# Patient Record
Sex: FEMALE | Race: WHITE | NOT HISPANIC OR LATINO | URBAN - METROPOLITAN AREA
[De-identification: names, ages, dates, MRNs, and addresses within clinical notes are randomized per-mention and may not be internally consistent; named-entity substitution may affect disease eponyms.]

---

## 2019-10-30 ENCOUNTER — INPATIENT (INPATIENT)
Facility: HOSPITAL | Age: 84
LOS: 4 days | Discharge: HOME HEALTH | DRG: 314 | End: 2019-11-04
Attending: STUDENT IN AN ORGANIZED HEALTH CARE EDUCATION/TRAINING PROGRAM | Admitting: INTERNAL MEDICINE
Payer: MEDICARE

## 2019-10-30 VITALS
TEMPERATURE: 98 F | WEIGHT: 134.92 LBS | DIASTOLIC BLOOD PRESSURE: 73 MMHG | RESPIRATION RATE: 18 BRPM | HEART RATE: 90 BPM | SYSTOLIC BLOOD PRESSURE: 126 MMHG | OXYGEN SATURATION: 98 % | HEIGHT: 62 IN

## 2019-10-30 DIAGNOSIS — E03.9 HYPOTHYROIDISM, UNSPECIFIED: ICD-10-CM

## 2019-10-30 DIAGNOSIS — I50.9 HEART FAILURE, UNSPECIFIED: ICD-10-CM

## 2019-10-30 DIAGNOSIS — E87.1 HYPO-OSMOLALITY AND HYPONATREMIA: ICD-10-CM

## 2019-10-30 DIAGNOSIS — Z29.9 ENCOUNTER FOR PROPHYLACTIC MEASURES, UNSPECIFIED: ICD-10-CM

## 2019-10-30 DIAGNOSIS — M19.90 UNSPECIFIED OSTEOARTHRITIS, UNSPECIFIED SITE: ICD-10-CM

## 2019-10-30 DIAGNOSIS — R94.5 ABNORMAL RESULTS OF LIVER FUNCTION STUDIES: ICD-10-CM

## 2019-10-30 DIAGNOSIS — R06.00 DYSPNEA, UNSPECIFIED: ICD-10-CM

## 2019-10-30 LAB
ALBUMIN SERPL ELPH-MCNC: 3 G/DL — LOW (ref 3.3–5)
ALP SERPL-CCNC: 103 U/L — SIGNIFICANT CHANGE UP (ref 40–120)
ALT FLD-CCNC: 79 U/L — HIGH (ref 10–45)
ANION GAP SERPL CALC-SCNC: 10 MMOL/L — SIGNIFICANT CHANGE UP (ref 5–17)
APTT BLD: 28.6 SEC — SIGNIFICANT CHANGE UP (ref 27.5–36.3)
AST SERPL-CCNC: 120 U/L — HIGH (ref 10–40)
BILIRUB SERPL-MCNC: 0.3 MG/DL — SIGNIFICANT CHANGE UP (ref 0.2–1.2)
BUN SERPL-MCNC: 15 MG/DL — SIGNIFICANT CHANGE UP (ref 7–23)
CALCIUM SERPL-MCNC: 8.7 MG/DL — SIGNIFICANT CHANGE UP (ref 8.4–10.5)
CHLORIDE SERPL-SCNC: 94 MMOL/L — LOW (ref 96–108)
CO2 SERPL-SCNC: 26 MMOL/L — SIGNIFICANT CHANGE UP (ref 22–31)
CREAT SERPL-MCNC: 0.61 MG/DL — SIGNIFICANT CHANGE UP (ref 0.5–1.3)
GLUCOSE SERPL-MCNC: 147 MG/DL — HIGH (ref 70–99)
HCT VFR BLD CALC: 35.7 % — SIGNIFICANT CHANGE UP (ref 34.5–45)
HGB BLD-MCNC: 11.6 G/DL — SIGNIFICANT CHANGE UP (ref 11.5–15.5)
INR BLD: 1.49 RATIO — HIGH (ref 0.88–1.16)
MCHC RBC-ENTMCNC: 29.1 PG — SIGNIFICANT CHANGE UP (ref 27–34)
MCHC RBC-ENTMCNC: 32.5 GM/DL — SIGNIFICANT CHANGE UP (ref 32–36)
MCV RBC AUTO: 89.7 FL — SIGNIFICANT CHANGE UP (ref 80–100)
NRBC # BLD: 0 /100 WBCS — SIGNIFICANT CHANGE UP (ref 0–0)
NT-PROBNP SERPL-SCNC: 1390 PG/ML — HIGH (ref 0–300)
PLATELET # BLD AUTO: 320 K/UL — SIGNIFICANT CHANGE UP (ref 150–400)
POTASSIUM SERPL-MCNC: 4.3 MMOL/L — SIGNIFICANT CHANGE UP (ref 3.5–5.3)
POTASSIUM SERPL-SCNC: 4.3 MMOL/L — SIGNIFICANT CHANGE UP (ref 3.5–5.3)
PROT SERPL-MCNC: 6.8 G/DL — SIGNIFICANT CHANGE UP (ref 6–8.3)
PROTHROM AB SERPL-ACNC: 17.4 SEC — HIGH (ref 10–12.9)
RBC # BLD: 3.98 M/UL — SIGNIFICANT CHANGE UP (ref 3.8–5.2)
RBC # FLD: 13.8 % — SIGNIFICANT CHANGE UP (ref 10.3–14.5)
SODIUM SERPL-SCNC: 130 MMOL/L — LOW (ref 135–145)
TROPONIN T, HIGH SENSITIVITY RESULT: 17 NG/L — SIGNIFICANT CHANGE UP (ref 0–51)
TROPONIN T, HIGH SENSITIVITY RESULT: 18 NG/L — SIGNIFICANT CHANGE UP (ref 0–51)
WBC # BLD: 9.84 K/UL — SIGNIFICANT CHANGE UP (ref 3.8–10.5)
WBC # FLD AUTO: 9.84 K/UL — SIGNIFICANT CHANGE UP (ref 3.8–10.5)

## 2019-10-30 PROCEDURE — 93010 ELECTROCARDIOGRAM REPORT: CPT

## 2019-10-30 PROCEDURE — 99285 EMERGENCY DEPT VISIT HI MDM: CPT

## 2019-10-30 PROCEDURE — 71045 X-RAY EXAM CHEST 1 VIEW: CPT | Mod: 26

## 2019-10-30 RX ORDER — FUROSEMIDE 40 MG
40 TABLET ORAL DAILY
Refills: 0 | Status: DISCONTINUED | OUTPATIENT
Start: 2019-10-30 | End: 2019-10-31

## 2019-10-30 RX ORDER — FUROSEMIDE 40 MG
20 TABLET ORAL ONCE
Refills: 0 | Status: COMPLETED | OUTPATIENT
Start: 2019-10-30 | End: 2019-10-30

## 2019-10-30 RX ORDER — HEPARIN SODIUM 5000 [USP'U]/ML
5000 INJECTION INTRAVENOUS; SUBCUTANEOUS EVERY 8 HOURS
Refills: 0 | Status: DISCONTINUED | OUTPATIENT
Start: 2019-10-30 | End: 2019-10-31

## 2019-10-30 RX ADMIN — HEPARIN SODIUM 5000 UNIT(S): 5000 INJECTION INTRAVENOUS; SUBCUTANEOUS at 22:12

## 2019-10-30 RX ADMIN — Medication 20 MILLIGRAM(S): at 17:31

## 2019-10-30 NOTE — H&P ADULT - NSHPREVIEWOFSYSTEMS_GEN_ALL_CORE
REVIEW OF SYSTEMS:    CONSTITUTIONAL: No weakness, fevers or chills  EYES/ENT: No visual changes;  No vertigo or throat pain   NECK: No pain or stiffness  RESPIRATORY: + dyspnea, SOB   CARDIOVASCULAR: mild chest discomfort at the time, resolved after diuresis   GASTROINTESTINAL: No abdominal or epigastric pain. No nausea, vomiting, or hematemesis; No diarrhea or constipation. No melena or hematochezia.  GENITOURINARY: No dysuria, frequency or hematuria  NEUROLOGICAL: No numbness or weakness  SKIN: No itching, burning, rashes, or lesions   All other review of systems is negative unless indicated above.

## 2019-10-30 NOTE — ED PROVIDER NOTE - PHYSICAL EXAMINATION
GEN: Well Appearing, Nontoxic, NAD  HEENT: NC/AT, Symm Facies. PERRL, EOMI, MMM, posterior pharynx clear  CV: No JVD/Bruits or stridor;  +S1S2, RRR w/o m/g/r  RESP: decreased breath sounds diffusely bilaterally  ABD: Soft, nt/nd, +BS. No guarding/rebound. No RUQ tender, no CVAT  EXT/MSK: No lower extremity edema or calf tenderness. WWP, palpable pulses. FROMx4  SKIN: No erythema, lesions or rash  Neuro: Grossly intact, AOX3 with normal speech, CN II-XII intact; Sensation intact, motor 5/5 throughout. Gait normal

## 2019-10-30 NOTE — H&P ADULT - HISTORY OF PRESENT ILLNESS
Pt is a 87 y/o Female with pmhx of arthritis coming in with progressively worsening shortness of breath x1 week. Patient states last week she started to feel short of breath when she would walk around or minimally exert herself, now for the last few days she has been feeling more short of breath with minimal movements. States that she has had some mild cough associated with this shortness of breath; however, denies any mucous production, fever or chills. Denies any chest pain associated with this. Denies ever having symptoms like this before. Does not use oxygen at home.

## 2019-10-30 NOTE — ED PROVIDER NOTE - CLINICAL SUMMARY MEDICAL DECISION MAKING FREE TEXT BOX
87 y/o F p/w worsening sob and dyspnea on exertion, now with sob at rest x1 week. PE remarkable for decreased breath sounds bilaterally. Never had this before, no chest pain. Will obtain labs, BNP, trop, ekg, xray chest and reassess.

## 2019-10-30 NOTE — H&P ADULT - PROBLEM SELECTOR PLAN 1
new onset with elevated ProBNP  diuresis   Ia nd OS  fluid restriction   serial CE and EKG   tele monitoring   Card eval   Echocardiogram  may benefits from ischemic W.U after euvolemic   monitor vitals

## 2019-10-30 NOTE — ED ADULT NURSE NOTE - OBJECTIVE STATEMENT
88F pt AxOx3 BIBA from home c/o worsening SOB/RMJq1rh. Pt states last week she was only STOCK and has now become SOB even at rest. Pt reports +orthopnea. Pt states she ambulates w/o assistance at baseline. PMHx hypothyroid, on Synthroid. Pt denies swelling to legs. Pt denies CP/N/V/D/fever/chills. Pt denies cough. On assessment, pt is tachypneic but tolerating 2L/NC well, +STOCK noted, resp even, unlabored. Abd soft/NT/ND. No edema noted to lower extremities. VSS. NAD noted. #20G RAC, labs drawn and sent. EKG done at bedside. Bed locked in lowest position. Granddaughter at bedside. MD at bedside. Will continue to monitor.

## 2019-10-30 NOTE — H&P ADULT - ASSESSMENT
Pt is a 87 y/o Female with pmhx of arthritis coming in with progressively worsening shortness of breath x1 week.

## 2019-10-30 NOTE — ED PROVIDER NOTE - NS ED ROS FT
Constitutional: No fever or chills  Eyes: No visual changes, eye pain or redness  HEENT: No throat pain, ear pain, nasal pain. No nose bleeding.  CV: No chest pain or lower extremity edema  Resp: +sob and cough  GI: No abd pain. No nausea or vomiting. No diarrhea. No constipation.   : No dysuria, hematuria.   MSK: No musculoskeletal pain  Skin: No rash  Neuro: No headache. No numbness or tingling. No weakness.

## 2019-10-30 NOTE — ED PROVIDER NOTE - ATTENDING CONTRIBUTION TO CARE
88 F w/ PMH of arthritis p/w worsening sob x1 week. Pt has hx of hypothyrodism recently stopped her synthroid. Pt states that she has no fevers, no chills. No nausea no vomiting. On room air pt is dyspneic and she becomes tachypneic. Pt states she is not on baseline oxygen.      I have reviewed the triage vital signs.   Const: Well-nourished, Well-developed,   Eyes: no conjunctival injection and no scleral icterus   ENMT: Moist mucus membranes,   CVS: +S1/S2, radial/DP pulse 2+ bilaterally  RESP: increased respiratory effort, diminished breath sounds bilaterally  GI: Nontender/Nondistended soft abdomen, no CVA tenderness   MSK: Extremities w/o deformity or ttp,   Psych: Awake, Alert, & Orientedx3;  Appropriate mood and affect, cooperative    ?valve disease no prior echo before, possibly acs, unlikely dissection/pneumonia. plan for labs, ekg and cxr w/admission 88 F w/ PMH of arthritis p/w worsening sob x1 week. Pt has hx of hypothyrodism recently stopped her synthroid. Pt states that she has no fevers, no chills. No nausea no vomiting. On room air pt is dyspneic and she becomes tachypneic. Pt states she is not on baseline oxygen.      I have reviewed the triage vital signs.   Const: Well-nourished, Well-developed,   Eyes: no conjunctival injection and no scleral icterus   ENMT: Moist mucus membranes,   CVS: +S1/S2, radial/DP pulse 2+ bilaterally  RESP: increased respiratory effort, diminished breath sounds bilaterally  GI: Nontender/Nondistended soft abdomen, no CVA tenderness   MSK: Extremities w/o deformity or ttp,   Psych: Awake, Alert, & Orientedx3;  Appropriate mood and affect, cooperative    ?valve disease no prior echo before, possibly acs, unlikely dissection/pneumonia. plan for labs, ekg and cxr w/admission      Pt w/ elevated BNP, will diuresis w/ lasix and admit never been dx w/ CHF in the past

## 2019-10-30 NOTE — H&P ADULT - NSHPPHYSICALEXAM_GEN_ALL_CORE
Vital Signs Last 24 Hrs  T(C): 36.9 (30 Oct 2019 16:00), Max: 36.9 (30 Oct 2019 14:49)  T(F): 98.4 (30 Oct 2019 16:00), Max: 98.4 (30 Oct 2019 14:49)  HR: 79 (30 Oct 2019 16:00) (73 - 90)  BP: 141/79 (30 Oct 2019 16:00) (126/73 - 145/70)  BP(mean): --  RR: 24 (30 Oct 2019 16:00) (18 - 24)  SpO2: 98% (30 Oct 2019 16:00) (98% - 99%) Vital Signs Last 24 Hrs  T(C): 36.9 (30 Oct 2019 16:00), Max: 36.9 (30 Oct 2019 14:49)  T(F): 98.4 (30 Oct 2019 16:00), Max: 98.4 (30 Oct 2019 14:49)  HR: 79 (30 Oct 2019 16:00) (73 - 90)  BP: 141/79 (30 Oct 2019 16:00) (126/73 - 145/70)  BP(mean): --  RR: 24 (30 Oct 2019 16:00) (18 - 24)  SpO2: 98% (30 Oct 2019 16:00) (98% - 99%)    Appearance: Normal	  HEENT:   Normal oral mucosa, PERRL, EOMI	  Lymphatic: No lymphadenopathy , no edema  Cardiovascular: S1S2, irregular   Respiratory: lower abse rales   Gastrointestinal:  Soft, Non-tender, + BS	  Skin: No rashes, No ecchymoses, No cyanosis, warm to touch  Musculoskeletal: Normal range of motion, normal strength  Psychiatry:  Mood & affect appropriate  Ext: No edema

## 2019-10-30 NOTE — H&P ADULT - NSHPLABSRESULTS_GEN_ALL_CORE
11.6   9.84  )-----------( 320      ( 30 Oct 2019 15:34 )             35.7               10-30    130<L>  |  94<L>  |  15  ----------------------------<  147<H>  4.3   |  26  |  0.61    Ca    8.7      30 Oct 2019 15:34    TPro  6.8  /  Alb  3.0<L>  /  TBili  0.3  /  DBili  x   /  AST  120<H>  /  ALT  79<H>  /  AlkPhos  103  10-30    PT/INR - ( 30 Oct 2019 15:34 )   PT: 17.4 sec;   INR: 1.49 ratio         PTT - ( 30 Oct 2019 15:34 )  PTT:28.6 sec                   < from: Xray Chest 1 View AP/PA (10.30.19 @ 15:36) >      IMPRESSION:  Left pleural effusion with associated left basilar   atelectasis. 11.6   9.84  )-----------( 320      ( 30 Oct 2019 15:34 )             35.7               10-30    130<L>  |  94<L>  |  15  ----------------------------<  147<H>  4.3   |  26  |  0.61    Ca    8.7      30 Oct 2019 15:34    TPro  6.8  /  Alb  3.0<L>  /  TBili  0.3  /  DBili  x   /  AST  120<H>  /  ALT  79<H>  /  AlkPhos  103  10-30    PT/INR - ( 30 Oct 2019 15:34 )   PT: 17.4 sec;   INR: 1.49 ratio         PTT - ( 30 Oct 2019 15:34 )  PTT:28.6 sec                   < from: Xray Chest 1 View AP/PA (10.30.19 @ 15:36) >      IMPRESSION:  Left pleural effusion with associated left basilar   atelectasis.    EKG: noted

## 2019-10-30 NOTE — ED PROVIDER NOTE - OBJECTIVE STATEMENT
87 y/o F pmhx arthritis coming in with progressively worsening shortness of breath x1 week. Patient states last week she started to feel short of breath when she would walk around or minimally exert herself, now for the last few days she has been feeling more short of breath with minimal movements. States that she has had some mild cough associated with this shortness of breath; however, denies any mucous production, fever or chills. Denies any chest pain associated with this. Denies ever having symptoms like this before. Does not use oxygen at home.

## 2019-10-31 DIAGNOSIS — I48.91 UNSPECIFIED ATRIAL FIBRILLATION: ICD-10-CM

## 2019-10-31 LAB
ALBUMIN SERPL ELPH-MCNC: 2.6 G/DL — LOW (ref 3.3–5)
ALBUMIN SERPL ELPH-MCNC: 2.9 G/DL — LOW (ref 3.3–5)
ALP SERPL-CCNC: 92 U/L — SIGNIFICANT CHANGE UP (ref 40–120)
ALP SERPL-CCNC: 94 U/L — SIGNIFICANT CHANGE UP (ref 40–120)
ALT FLD-CCNC: 67 U/L — HIGH (ref 10–45)
ALT FLD-CCNC: 76 U/L — HIGH (ref 10–45)
ANION GAP SERPL CALC-SCNC: 18 MMOL/L — HIGH (ref 5–17)
ANION GAP SERPL CALC-SCNC: 9 MMOL/L — SIGNIFICANT CHANGE UP (ref 5–17)
APPEARANCE UR: ABNORMAL
APTT BLD: 102.9 SEC — HIGH (ref 27.5–36.3)
AST SERPL-CCNC: 59 U/L — HIGH (ref 10–40)
AST SERPL-CCNC: 72 U/L — HIGH (ref 10–40)
BACTERIA # UR AUTO: NEGATIVE — SIGNIFICANT CHANGE UP
BILIRUB SERPL-MCNC: 0.4 MG/DL — SIGNIFICANT CHANGE UP (ref 0.2–1.2)
BILIRUB SERPL-MCNC: 0.4 MG/DL — SIGNIFICANT CHANGE UP (ref 0.2–1.2)
BILIRUB UR-MCNC: NEGATIVE — SIGNIFICANT CHANGE UP
BLD GP AB SCN SERPL QL: NEGATIVE — SIGNIFICANT CHANGE UP
BUN SERPL-MCNC: 13 MG/DL — SIGNIFICANT CHANGE UP (ref 7–23)
BUN SERPL-MCNC: 18 MG/DL — SIGNIFICANT CHANGE UP (ref 7–23)
CALCIUM SERPL-MCNC: 8.7 MG/DL — SIGNIFICANT CHANGE UP (ref 8.4–10.5)
CALCIUM SERPL-MCNC: 8.8 MG/DL — SIGNIFICANT CHANGE UP (ref 8.4–10.5)
CHLORIDE SERPL-SCNC: 88 MMOL/L — LOW (ref 96–108)
CHLORIDE SERPL-SCNC: 92 MMOL/L — LOW (ref 96–108)
CHLORIDE UR-SCNC: 66 MMOL/L — SIGNIFICANT CHANGE UP
CHOLEST SERPL-MCNC: 133 MG/DL — SIGNIFICANT CHANGE UP (ref 10–199)
CK MB CFR SERPL CALC: 3 NG/ML — SIGNIFICANT CHANGE UP (ref 0–3.8)
CO2 SERPL-SCNC: 27 MMOL/L — SIGNIFICANT CHANGE UP (ref 22–31)
CO2 SERPL-SCNC: 30 MMOL/L — SIGNIFICANT CHANGE UP (ref 22–31)
COLOR SPEC: YELLOW — SIGNIFICANT CHANGE UP
COMMENT - URINE: SIGNIFICANT CHANGE UP
CREAT SERPL-MCNC: 0.58 MG/DL — SIGNIFICANT CHANGE UP (ref 0.5–1.3)
CREAT SERPL-MCNC: 0.71 MG/DL — SIGNIFICANT CHANGE UP (ref 0.5–1.3)
DIFF PNL FLD: ABNORMAL
EPI CELLS # UR: 16 /HPF — HIGH (ref 0–5)
GLUCOSE SERPL-MCNC: 114 MG/DL — HIGH (ref 70–99)
GLUCOSE SERPL-MCNC: 147 MG/DL — HIGH (ref 70–99)
GLUCOSE UR QL: NEGATIVE — SIGNIFICANT CHANGE UP
GRAN CASTS # UR COMP ASSIST: 2 /LPF — HIGH
HBA1C BLD-MCNC: 5.6 % — SIGNIFICANT CHANGE UP (ref 4–5.6)
HCT VFR BLD CALC: 37 % — SIGNIFICANT CHANGE UP (ref 34.5–45)
HCT VFR BLD CALC: 38.6 % — SIGNIFICANT CHANGE UP (ref 34.5–45)
HDLC SERPL-MCNC: 43 MG/DL — LOW
HGB BLD-MCNC: 11.9 G/DL — SIGNIFICANT CHANGE UP (ref 11.5–15.5)
HGB BLD-MCNC: 12.4 G/DL — SIGNIFICANT CHANGE UP (ref 11.5–15.5)
HYALINE CASTS # UR AUTO: 11 /LPF — HIGH (ref 0–7)
KETONES UR-MCNC: NEGATIVE — SIGNIFICANT CHANGE UP
LEUKOCYTE ESTERASE UR-ACNC: ABNORMAL
LIPID PNL WITH DIRECT LDL SERPL: 70 MG/DL — SIGNIFICANT CHANGE UP
MAGNESIUM SERPL-MCNC: 2 MG/DL — SIGNIFICANT CHANGE UP (ref 1.6–2.6)
MCHC RBC-ENTMCNC: 28.7 PG — SIGNIFICANT CHANGE UP (ref 27–34)
MCHC RBC-ENTMCNC: 29.2 PG — SIGNIFICANT CHANGE UP (ref 27–34)
MCHC RBC-ENTMCNC: 32.1 GM/DL — SIGNIFICANT CHANGE UP (ref 32–36)
MCHC RBC-ENTMCNC: 32.2 GM/DL — SIGNIFICANT CHANGE UP (ref 32–36)
MCV RBC AUTO: 89.4 FL — SIGNIFICANT CHANGE UP (ref 80–100)
MCV RBC AUTO: 90.8 FL — SIGNIFICANT CHANGE UP (ref 80–100)
NITRITE UR-MCNC: NEGATIVE — SIGNIFICANT CHANGE UP
NRBC # BLD: 0 /100 WBCS — SIGNIFICANT CHANGE UP (ref 0–0)
NRBC # BLD: 0 /100 WBCS — SIGNIFICANT CHANGE UP (ref 0–0)
OSMOLALITY UR: 466 MOSM/KG — SIGNIFICANT CHANGE UP (ref 50–1200)
PH UR: 6 — SIGNIFICANT CHANGE UP (ref 5–8)
PHOSPHATE SERPL-MCNC: 3 MG/DL — SIGNIFICANT CHANGE UP (ref 2.5–4.5)
PLATELET # BLD AUTO: 307 K/UL — SIGNIFICANT CHANGE UP (ref 150–400)
PLATELET # BLD AUTO: 387 K/UL — SIGNIFICANT CHANGE UP (ref 150–400)
POTASSIUM SERPL-MCNC: 3.9 MMOL/L — SIGNIFICANT CHANGE UP (ref 3.5–5.3)
POTASSIUM SERPL-MCNC: 4.3 MMOL/L — SIGNIFICANT CHANGE UP (ref 3.5–5.3)
POTASSIUM SERPL-SCNC: 3.9 MMOL/L — SIGNIFICANT CHANGE UP (ref 3.5–5.3)
POTASSIUM SERPL-SCNC: 4.3 MMOL/L — SIGNIFICANT CHANGE UP (ref 3.5–5.3)
PROT SERPL-MCNC: 6.6 G/DL — SIGNIFICANT CHANGE UP (ref 6–8.3)
PROT SERPL-MCNC: 6.8 G/DL — SIGNIFICANT CHANGE UP (ref 6–8.3)
PROT UR-MCNC: SIGNIFICANT CHANGE UP
RBC # BLD: 4.14 M/UL — SIGNIFICANT CHANGE UP (ref 3.8–5.2)
RBC # BLD: 4.25 M/UL — SIGNIFICANT CHANGE UP (ref 3.8–5.2)
RBC # FLD: 13.7 % — SIGNIFICANT CHANGE UP (ref 10.3–14.5)
RBC # FLD: 13.7 % — SIGNIFICANT CHANGE UP (ref 10.3–14.5)
RBC CASTS # UR COMP ASSIST: 5 /HPF — HIGH (ref 0–4)
RH IG SCN BLD-IMP: POSITIVE — SIGNIFICANT CHANGE UP
SODIUM SERPL-SCNC: 128 MMOL/L — LOW (ref 135–145)
SODIUM SERPL-SCNC: 136 MMOL/L — SIGNIFICANT CHANGE UP (ref 135–145)
SODIUM UR-SCNC: 42 MMOL/L — SIGNIFICANT CHANGE UP
SP GR SPEC: 1.01 — SIGNIFICANT CHANGE UP (ref 1.01–1.02)
TOTAL CHOLESTEROL/HDL RATIO MEASUREMENT: 3.1 RATIO — LOW (ref 3.3–7.1)
TRIGL SERPL-MCNC: 98 MG/DL — SIGNIFICANT CHANGE UP (ref 10–149)
TROPONIN T, HIGH SENSITIVITY RESULT: 19 NG/L — SIGNIFICANT CHANGE UP (ref 0–51)
TSH SERPL-MCNC: 2.85 UIU/ML — SIGNIFICANT CHANGE UP (ref 0.27–4.2)
UROBILINOGEN FLD QL: SIGNIFICANT CHANGE UP
WBC # BLD: 10.27 K/UL — SIGNIFICANT CHANGE UP (ref 3.8–10.5)
WBC # BLD: 7.28 K/UL — SIGNIFICANT CHANGE UP (ref 3.8–10.5)
WBC # FLD AUTO: 10.27 K/UL — SIGNIFICANT CHANGE UP (ref 3.8–10.5)
WBC # FLD AUTO: 7.28 K/UL — SIGNIFICANT CHANGE UP (ref 3.8–10.5)
WBC UR QL: 10 /HPF — HIGH (ref 0–5)

## 2019-10-31 PROCEDURE — 71250 CT THORAX DX C-: CPT | Mod: 26

## 2019-10-31 PROCEDURE — 99223 1ST HOSP IP/OBS HIGH 75: CPT | Mod: GC

## 2019-10-31 PROCEDURE — 93010 ELECTROCARDIOGRAM REPORT: CPT

## 2019-10-31 PROCEDURE — 93306 TTE W/DOPPLER COMPLETE: CPT | Mod: 26

## 2019-10-31 PROCEDURE — 93010 ELECTROCARDIOGRAM REPORT: CPT | Mod: 77

## 2019-10-31 RX ORDER — FUROSEMIDE 40 MG
40 TABLET ORAL
Refills: 0 | Status: DISCONTINUED | OUTPATIENT
Start: 2019-10-31 | End: 2019-10-31

## 2019-10-31 RX ORDER — METOPROLOL TARTRATE 50 MG
5 TABLET ORAL ONCE
Refills: 0 | Status: COMPLETED | OUTPATIENT
Start: 2019-10-31 | End: 2019-10-31

## 2019-10-31 RX ORDER — HEPARIN SODIUM 5000 [USP'U]/ML
5000 INJECTION INTRAVENOUS; SUBCUTANEOUS EVERY 6 HOURS
Refills: 0 | Status: DISCONTINUED | OUTPATIENT
Start: 2019-10-31 | End: 2019-11-01

## 2019-10-31 RX ORDER — HEPARIN SODIUM 5000 [USP'U]/ML
INJECTION INTRAVENOUS; SUBCUTANEOUS
Qty: 25000 | Refills: 0 | Status: DISCONTINUED | OUTPATIENT
Start: 2019-10-31 | End: 2019-11-01

## 2019-10-31 RX ORDER — LEVOTHYROXINE SODIUM 125 MCG
25 TABLET ORAL DAILY
Refills: 0 | Status: DISCONTINUED | OUTPATIENT
Start: 2019-10-31 | End: 2019-11-04

## 2019-10-31 RX ORDER — HEPARIN SODIUM 5000 [USP'U]/ML
2500 INJECTION INTRAVENOUS; SUBCUTANEOUS EVERY 6 HOURS
Refills: 0 | Status: DISCONTINUED | OUTPATIENT
Start: 2019-10-31 | End: 2019-11-01

## 2019-10-31 RX ORDER — METOPROLOL TARTRATE 50 MG
25 TABLET ORAL
Refills: 0 | Status: DISCONTINUED | OUTPATIENT
Start: 2019-10-31 | End: 2019-10-31

## 2019-10-31 RX ORDER — POTASSIUM CHLORIDE 20 MEQ
20 PACKET (EA) ORAL
Refills: 0 | Status: COMPLETED | OUTPATIENT
Start: 2019-10-31 | End: 2019-10-31

## 2019-10-31 RX ORDER — METOPROLOL TARTRATE 50 MG
2.5 TABLET ORAL ONCE
Refills: 0 | Status: COMPLETED | OUTPATIENT
Start: 2019-10-31 | End: 2019-10-31

## 2019-10-31 RX ORDER — SENNA PLUS 8.6 MG/1
1 TABLET ORAL ONCE
Refills: 0 | Status: COMPLETED | OUTPATIENT
Start: 2019-10-31 | End: 2019-10-31

## 2019-10-31 RX ADMIN — Medication 20 MILLIEQUIVALENT(S): at 20:22

## 2019-10-31 RX ADMIN — Medication 2.5 MILLIGRAM(S): at 07:09

## 2019-10-31 RX ADMIN — HEPARIN SODIUM 5000 UNIT(S): 5000 INJECTION INTRAVENOUS; SUBCUTANEOUS at 05:45

## 2019-10-31 RX ADMIN — Medication 5 MILLIGRAM(S): at 09:02

## 2019-10-31 RX ADMIN — Medication 25 MILLIGRAM(S): at 09:04

## 2019-10-31 RX ADMIN — HEPARIN SODIUM 1000 UNIT(S)/HR: 5000 INJECTION INTRAVENOUS; SUBCUTANEOUS at 18:24

## 2019-10-31 RX ADMIN — SENNA PLUS 1 TABLET(S): 8.6 TABLET ORAL at 21:55

## 2019-10-31 RX ADMIN — Medication 40 MILLIGRAM(S): at 07:34

## 2019-10-31 RX ADMIN — HEPARIN SODIUM 1100 UNIT(S)/HR: 5000 INJECTION INTRAVENOUS; SUBCUTANEOUS at 10:01

## 2019-10-31 NOTE — CONSULT NOTE ADULT - SUBJECTIVE AND OBJECTIVE BOX
[facility]   MD Ceci Posey D.O. Fatima Sheikh, D.O. Ruoru Wong, PA    From 7 AM - 5 PM:  OFFICE: [phone]  Dr. Frey cell: [phone]  Dr. Gaona cell: [phone]   cell: [phone]  CRYSTAL Rosales cell: [phone]    From 5 PM - 7 AM: Answering Service: [phone]      -- INITIAL RENAL CONSULT NOTE  --------------------------------------------------------------------------------  HPI: Ms. Galvan is an 87 yo F w/ PMH arthritis who came in for evaluation of SOB X1 week. Found to have new onset heart failure and Afib.     Currently, she has no complaints. Does not feel SOB today. Denies any N/V, diarrhea, abdominal pain. Never told she has any renal problems. Unaware of hyponatremia in the past.        PAST HISTORY  --------------------------------------------------------------------------------  PAST MEDICAL & SURGICAL HISTORY:  Hypothyroid    FAMILY HISTORY:    PAST SOCIAL HISTORY:    ALLERGIES & MEDICATIONS  --------------------------------------------------------------------------------  Allergies    No Known Allergies    Intolerances      Standing Inpatient Medications  furosemide   Injectable 40 milliGRAM(s) IV Push daily  heparin  Infusion.  Unit(s)/Hr IV Continuous <Continuous>  metoprolol tartrate 25 milliGRAM(s) Oral two times a day    PRN Inpatient Medications  heparin  Injectable 5000 Unit(s) IV Push every 6 hours PRN  heparin  Injectable 2500 Unit(s) IV Push every 6 hours PRN      REVIEW OF SYSTEMS  --------------------------------------------------------------------------------  Gen: No fevers/chills  Skin: No rashes  Head/Eyes/Ears: Normal hearing,  Normal vision   Respiratory: No dyspnea, cough  CV: No chest pain  GI: No abdominal pain, diarrhea, constipation, nausea, vomiting  : No dysuria, hematuria  MSK: No  edema  Heme: No easy bruising or bleeding  Psych: No significant depression    All other systems were reviewed and are negative, except as noted.    VITALS/PHYSICAL EXAM  --------------------------------------------------------------------------------  T(C): 36.3 (10-31-19 @ 12:29), Max: 36.9 (10-30-19 @ 14:49)  HR: 100 (10-31-19 @ 12:29) (73 - 120)  BP: 110/67 (10-31-19 @ 12:29) (108/76 - 152/72)  RR: 18 (10-31-19 @ 12:29) (16 - 24)  SpO2: 98% (10-31-19 @ 12:29) (96% - 99%)  Wt(kg): --  Height (cm): 160 (10-30-19 @ 18:45)  Weight (kg): 64 (10-30-19 @ 18:45)  BMI (kg/m2): 25 (10-30-19 @ 18:45)  BSA (m2): 1.67 (10-30-19 @ 18:45)      10-30-19 @ 07:01  -  10-31-19 @ 07:00  --------------------------------------------------------  IN: 240 mL / OUT: 150 mL / NET: 90 mL      Physical Exam:  	Gen: NAD  	HEENT: MMM  	Pulm: CTA B/L  	CV: S1S2, + murmur  	Abd: Soft, +BS   	Ext: No LE edema B/L  	Neuro: Awake  	Skin: Warm and dry  	    LABS/STUDIES  --------------------------------------------------------------------------------              11.9   7.28  >-----------<  307      [10-31-19 @ 06:47]              37.0     136  |  88  |  13  ----------------------------<  114      [10-31-19 @ 06:47]  3.9   |  30  |  0.58        Ca     8.8     [10-31-19 @ 06:47]    TPro  6.8  /  Alb  2.9  /  TBili  0.4  /  DBili  x   /  AST  72  /  ALT  76  /  AlkPhos  94  [10-31-19 @ 06:47]    PT/INR: PT 17.4 , INR 1.49       [10-30-19 @ 15:34]  PTT: 28.6       [10-30-19 @ 15:34]    Uric acid 3.4      [10-30-19 @ 17:45]    Creatinine Trend:  SCr 0.58 [10-31 @ 06:47]  SCr 0.61 [10-30 @ 15:34]    Urinalysis - [10-31-19 @ 09:41]      Color Yellow / Appearance Slightly Turbid / SG 1.015 / pH 6.0      Gluc Negative / Ketone Negative  / Bili Negative / Urobili <2 mg/dL       Blood Moderate / Protein Trace / Leuk Est Small / Nitrite Negative      RBC 5 / WBC 10 / Hyaline 11 / Gran 2 / Sq Epi  / Non Sq Epi 16 / Bacteria Negative    Urine Sodium 42      [10-31-19 @ 06:49]  Urine Chloride 66      [10-31-19 @ 06:49]  Urine Osmolality 466      [10-31-19 @ 09:41]    HbA1c 5.6      [10-31-19 @ 08:54]  TSH 2.85      [10-31-19 @ 08:38]  Lipid: chol 133, TG 98, HDL 43, LDL 70      [10-31-19 @ 08:34]

## 2019-10-31 NOTE — PROGRESS NOTE ADULT - PROBLEM SELECTOR PLAN 1
Newonset  rate control with lopressor   Card terrance appreciated  tele monitoring  IV BB for rate control PRN   discussed with family, agreed for AC with heparin and switch to DOACS when ready for discharge   Echo

## 2019-10-31 NOTE — CHART NOTE - NSCHARTNOTEFT_GEN_A_CORE
Patient is a 88y old  Female who presents with a chief complaint of SOB, CHF exacerbation ; Asked to evaluate for new onset afib with RVR @ 9:20 AM> . seen and examined. Pt having breakfast. denies palpitations /increased SOB. Endorses having palpitations on and OFF at home.     Vital Signs Last 24 Hrs  T(C): 36.6 (31 Oct 2019 09:06), Max: 36.9 (30 Oct 2019 14:49)  T(F): 97.8 (31 Oct 2019 09:06), Max: 98.4 (30 Oct 2019 14:49)  HR: 99 (31 Oct 2019 09:21) (73 - 120)  BP: 118/77 (31 Oct 2019 09:21) (108/76 - 152/72)  RR: 18 (31 Oct 2019 09:06) (16 - 24)  SpO2: 96% (31 Oct 2019 09:06) (96% - 99%)      PHYSICAL EXAM:  General: NAD  Respiratory: b/l exp wheeze  Cardiovascular: S1S2 irregular  Gastrointestinal: soft, + BS  Extremities: NO edema  Vascular: +2 pedal pulses   Neurological: Awake & alert    Assessment   87 y/o Female with pmhx of arthritis coming in with progressively worsening shortness of breath x1 week. Noted to be in ACute pulmonary edema with Pleural effusions on chest X-ray and elevated ProBNP. Now in afib with RVR ( new onset ) with HR 150s.    Plan   12 lead EKG  Lopressor 5 mg IVP  start Lopressor 25 mg PO bid  start Heparin drip ( Discussed with Family)  Attending present and agrees with plan  Cardiology consult called

## 2019-10-31 NOTE — CONSULT NOTE ADULT - ATTENDING COMMENTS
**Echo personally reviewed  Early tamponade physiology.   Rapid AF. Symptoms.   Hemodynamically stable.  Give IVF. Would D./c lopressor. Move to CCU2.  Discussed w patient/family

## 2019-10-31 NOTE — PROVIDER CONTACT NOTE (OTHER) - ASSESSMENT
Patient is A&O x 4. Denies chest pain, dizziness, is asymptomatic. VS: /67, HR 120s, Temp 97.3, RR 18, O2 96%.

## 2019-10-31 NOTE — CONSULT NOTE ADULT - ASSESSMENT
89 y/o Female with pmhx of arthritis p/w ADHF and new onset afib w/ RVR.    #ADHF 2/2 afib w/ rvr  -clinical course and symptomatology suggests afib etiology for ADHF  -do not suspect ACS at this time  -hypervolemic on exam; elevated JVP, pulm edema  -s/p Lasix 40mg IV, excellent UOP  -would c/w Lasix 40mg IV BID  -okay to continue Lopressor 25mg bid  -if rates remained > 100 throughout day, increase evening metoprolol 50mg  -metoprolol 5mg IVP q6h PRN sustained HR > 110  -monitor i/os, BID BMP  -obtain TTE  -CHADsVasc > 3, on hep gtt, would keep for now pending further workup of pleural effusion  -defer ischemic eval at this time pending TTE results    #left sided pleural effusion  -unusual to have unilateral left sided pleural effusion in setting of ADHF  -would consult pulm for further eval    Will discuss w/ attending  Simeon Matamoros MD  Cardiology Fellow - PGY 4  Text or Call: [phone]  For all New Consults and Questions:  [email or web address]   Login: [email or web address] 89 y/o Female with pmhx of arthritis p/w ADHF and new onset afib w/ RVR.    #ADHF 2/2 afib w/ rvr  -clinical course and symptomatology suggests afib etiology for ADHF  -do not suspect ACS at this time  -hypervolemic on exam; elevated JVP, pulm edema  -s/p Lasix 40mg IV, excellent UOP  -would c/w Lasix 40mg IV BID  -okay to continue Lopressor 25mg bid  -if rates remained > 100 throughout day, increase evening metoprolol 50mg  -metoprolol 5mg IVP q6h PRN sustained HR > 110  -monitor i/os, BID BMP  -obtain TTE  -CHADsVasc > 3, on hep gtt, would keep for now pending further workup of pleural effusion  -defer ischemic eval at this time pending TTE results    #left sided pleural effusion  -unusual to have unilateral left sided pleural effusion in setting of ADHF  -degree of hypoxia/tachpnea w/ otherwise trace pulm edema suggest effusion is complicating clinical picture  -would consider thoracentesis/pulm consult for further eval    Will discuss w/ attending  Simeon Matamoros MD  Cardiology Fellow - PGY 4  Text or Call: [phone]  For all New Consults and Questions:  [email or web address]   Login: [email or web address] 87 y/o Female with pmhx of arthritis p/w ADHF and new onset afib w/ RVR.    Please see attending addendum    #ADHF 2/2 afib w/ rvr  -clinical course and symptomatology suggests afib etiology for ADHF  -do not suspect ACS at this time  -hypervolemic on exam; elevated JVP, pulm edema  -s/p Lasix 40mg IV, excellent UOP  -would c/w Lasix 40mg IV BID  -okay to continue Lopressor 25mg bid  -if rates remained > 100 throughout day, increase evening metoprolol 50mg  -metoprolol 5mg IVP q6h PRN sustained HR > 110  -monitor i/os, BID BMP  -obtain TTE  -CHADsVasc > 3, on hep gtt, would keep for now pending further workup of pleural effusion  -defer ischemic eval at this time pending TTE results    #left sided pleural effusion  -unusual to have unilateral left sided pleural effusion in setting of ADHF  -degree of hypoxia/tachpnea w/ otherwise trace pulm edema suggest effusion is complicating clinical picture  -would consider thoracentesis/pulm consult for further eval    Will discuss w/ attending  Simeon Matamoros MD  Cardiology Fellow - PGY 4  Text or Call: [phone]  For all New Consults and Questions:  [email or web address]   Login: [email or web address]

## 2019-10-31 NOTE — PROGRESS NOTE ADULT - ASSESSMENT
Pt is a 89 y/o Female with pmhx of arthritis coming in with progressively worsening shortness of breath x1 week.

## 2019-10-31 NOTE — PROGRESS NOTE ADULT - PROBLEM SELECTOR PLAN 3
-trend 130, 136, 128  -follow up AM labs  -per renal, NA should not change by more than 8 mEqs in 24 hours

## 2019-10-31 NOTE — PROGRESS NOTE ADULT - PROBLEM SELECTOR PLAN 2
new onset with elevated ProBNP  unilateral effusion   diuresis as tolerated   Ia nd OS  fluid restriction   serial CE and EKG   tele monitoring   Card eval appreciated   Echocardiogram  may benefits from ischemic W.U after euvolemic   monitor vitals

## 2019-10-31 NOTE — PROVIDER CONTACT NOTE (OTHER) - ACTION/TREATMENT ORDERED:
see e mar
CRYSTAL Hyde aware. Metoprolol 2.5 mg given as ordered. Will continue to monitor.
MARC Lerma aware. No interventions at this time. Will continue to monitor.

## 2019-10-31 NOTE — PROVIDER CONTACT NOTE (OTHER) - ASSESSMENT
Patient is A&O x4. Denies chest pain, dizziness; is asymptomatic. VS: /84, HR 68, Temp 97.5, RR 18, O2 96% on 2L O2 via nasal cannula

## 2019-10-31 NOTE — CHART NOTE - NSCHARTNOTEFT_GEN_A_CORE
CCU Transfer Note    Transfer from: CCU    Transfer to: (  ) Medicine    (  ) Telemetry     (   ) RCU        (    ) Palliative         (   ) Stroke Unit       (  ) MICU   (   ) __________________    Accepting Physician:    Signout given to:     CCU COURSE:      PAST MEDICAL & SURGICAL HISTORY:  Hypothyroid      Vital Signs Last 24 Hrs  T(C): 36.7 (31 Oct 2019 17:33), Max: 36.8 (30 Oct 2019 18:45)  T(F): 98 (31 Oct 2019 17:33), Max: 98.2 (30 Oct 2019 18:45)  HR: 100 (31 Oct 2019 17:33) (90 - 120)  BP: 111/66 (31 Oct 2019 17:33) (108/76 - 152/72)  BP(mean): --  RR: 18 (31 Oct 2019 17:33) (16 - 18)  SpO2: 98% (31 Oct 2019 17:33) (96% - 98%)  I&O's Summary    30 Oct 2019 07:01  -  31 Oct 2019 07:00  --------------------------------------------------------  IN: 240 mL / OUT: 150 mL / NET: 90 mL    31 Oct 2019 07:01  -  31 Oct 2019 18:28  --------------------------------------------------------  IN: 420 mL / OUT: 900 mL / NET: -480 mL      Allergies    No Known Allergies    Intolerances      MEDICATIONS  (STANDING):  heparin  Infusion.  Unit(s)/Hr (11 mL/Hr) IV Continuous <Continuous>  levothyroxine 25 MICROGram(s) Oral daily  metoprolol tartrate 25 milliGRAM(s) Oral two times a day    MEDICATIONS  (PRN):  heparin  Injectable 5000 Unit(s) IV Push every 6 hours PRN For aPTT less than 40  heparin  Injectable 2500 Unit(s) IV Push every 6 hours PRN For aPTT between 40 - 57      Adult Advanced Hemodynamics Last 24 Hrs  CVP(mm Hg): --  CVP(cm H2O): --  CO: --  CI: --  PA: --  PA(mean): --  PCWP: --  SVR: --  SVRI: --  PVR: --  PVRI: --    CARDIAC MARKERS ( 31 Oct 2019 06:47 )  x     / x     / x     / x     / 3.0 ng/mL                            12.4   10.27 )-----------( 387      ( 31 Oct 2019 17:55 )             38.6     10-31    136  |  88<L>  |  13  ----------------------------<  114<H>  3.9   |  30  |  0.58    Ca    8.8      31 Oct 2019 06:47    TPro  6.8  /  Alb  2.9<L>  /  TBili  0.4  /  DBili  x   /  AST  72<H>  /  ALT  76<H>  /  AlkPhos  94  10-31    PT/INR - ( 30 Oct 2019 15:34 )   PT: 17.4 sec;   INR: 1.49 ratio         PTT - ( 31 Oct 2019 17:55 )  PTT:102.9 sec  PHYSICAL EXAM:      Constitutional:    Eyes:    ENMT:    Neck:    Breasts:    Back:    Respiratory:    Cardiovascular:    Gastrointestinal:    Genitourinary:    Rectal:    Extremities:    Vascular:    Neurological:    Skin:    Lymph Nodes:    Musculoskeletal:    Psychiatric:        Lactate:    Ekg:  Telemetry:  Echo:  Cardiac Cath:  Stress Test:  Imaging:    ASSESSMENT & PLAN:             FOR FOLLOW UP: CCU Transfer Note    Transfer from: Medicine    Transfer to: CCU2    Accepting Physician: Paris Mota    Medicine COURSE:  Ms Galvan is an 89 yo F with a PMH significant for arthritis and recently diagnosed hypothyroidism who presented with STOCK. Patient was diuresed overnight and course complicated by AF with RVR, found to have evidence of elevated pericardial pressures on TTE, transferred to CCU2 for further monitoring. Patient's TTE findings discussed with interventional cardiology who recommended IR consult for drainage as there is no adequate pocket of fluid for US guided or fluoro guided drainage. Per IR, given hemodynamic stability, patient is to receive CT scan with likely drainage planned for tomorrow.      PAST MEDICAL & SURGICAL HISTORY:  Hypothyroid  Arthritis    ALLERGIES: NKDA      MEDICATIONS  (STANDING):  heparin  Infusion.  Unit(s)/Hr (11 mL/Hr) IV Continuous <Continuous>  levothyroxine 25 MICROGram(s) Oral daily  metoprolol tartrate 25 milliGRAM(s) Oral two times a day    MEDICATIONS  (PRN):  heparin  Injectable 5000 Unit(s) IV Push every 6 hours PRN For aPTT less than 40  heparin  Injectable 2500 Unit(s) IV Push every 6 hours PRN For aPTT between 40 - 57      Vital Signs Last 24 Hrs  T(C): 36.7 (31 Oct 2019 17:33), Max: 36.8 (30 Oct 2019 18:45)  T(F): 98 (31 Oct 2019 17:33), Max: 98.2 (30 Oct 2019 18:45)  HR: 100 (31 Oct 2019 17:33) (90 - 120)  BP: 111/66 (31 Oct 2019 17:33) (108/76 - 152/72)  BP(mean): --  RR: 18 (31 Oct 2019 17:33) (16 - 18)  SpO2: 98% (31 Oct 2019 17:33) (96% - 98%)  I&O's Summary    30 Oct 2019 07:01  -  31 Oct 2019 07:00  --------------------------------------------------------  IN: 240 mL / OUT: 150 mL / NET: 90 mL    31 Oct 2019 07:01  -  31 Oct 2019 18:28  --------------------------------------------------------  IN: 420 mL / OUT: 900 mL / NET: -480 mL      GENERAL: mild resp distress, pleasant  HEAD:  Atraumatic, Normocephalic  ENT: EOMI, PERRLA, conjunctiva and sclera clear, Neck supple, elevated JVP  CHEST/LUNG: bibasilar crackles L > R, mild cardiac wheeze, equal breath sounds bilaterally   HEART: irregular, tachycardic, distant heart sounds, No murmurs, rubs, or gallops  ABDOMEN: Soft, Nontender, Nondistended; Bowel sounds present  EXTREMITIES:  No clubbing, cyanosis, or edema  PSYCH: Nl behavior, nl affect  NEUROLOGY: AAOx3, non-focal, cranial nerves intact  SKIN: Normal color, No rashes or lesions        CARDIAC MARKERS ( 31 Oct 2019 06:47 )  x     / x     / x     / x     / 3.0 ng/mL                          12.4   10.27 )-----------( 387      ( 31 Oct 2019 17:55 )             38.6     10-31    136  |  88<L>  |  13  ----------------------------<  114<H>  3.9   |  30  |  0.58    Ca    8.8      31 Oct 2019 06:47    TPro  6.8  /  Alb  2.9<L>  /  TBili  0.4  /  DBili  x   /  AST  72<H>  /  ALT  76<H>  /  AlkPhos  94  10-31    PT/INR - ( 30 Oct 2019 15:34 )   PT: 17.4 sec;   INR: 1.49 ratio     PTT - ( 31 Oct 2019 17:55 )  PTT:102.9 sec      EKG: NSR, PACs, PVCs      Echo:  10/31/19  Conclusions:  1. Mitral annular calcification, otherwise normal mitral  valve. Mild mitral regurgitation.  2. Calcified trileaflet aortic valve with normal opening.  No aortic valve regurgitation seen.  3. Normal left ventricular systolic function. No segmental  wall motion abnormalities.  4. Normal right ventricular size and function.  5. Thickened pericardium. Moderate to large pericardial  effusion. The effusion measures 1.7 cm anterior to the  right ventricle and 2.2cm posterior to the left ventricle.  There is an organized component of the effusion adherent to  the right ventricle measuring 1cm.  There is significant respiratory variation across the  mitral valve.  There is partial collapse of the right  atrium and right ventricle.   Echocardiographic evidence of pericardial tamponade.  6. Bilateral pleural effusions.  *** No previous Echo exam.  Results communicated to Dr. King.    CXR: left sided effusion      ASSESSMENT & PLAN:   89 yo F with a PMH significant for arthritis and recently diagnosed hypothyroidism who presented with STOCK. Patient was diuresed overnight and course complicated by AF with RVR, found to have evidence of elevated pericardial pressures on TTE, transferred to CCU2 for further monitoring.    #Pericardial effusion  TTE as above with effusion 1.7 cm anterior to RV, 2.2cm posterior to LV, partial RV and RA collapse, significant respiratory variation.  - d/c lasix  - CT chest to evaluate size and location of effusion for IR  - NPO after midnight for likely IR drainage of pericardial effusion  - if hypotensive -> stat TTE and IVF bolus      #AF  Episodes of RVR. CHADSVASC 3.  - hold metoprolol given echo evidence of tamponade  - if persistently RVR, can try amio or digoxin IVP  - can continue hep gtt    #Hypothyroidism  - continue levothyroxine    Miguel Syed MD  Cardiology Fellow  [phone]  All Cardiology service information can be found 24/7 on [email or web address], password: [email or web address]

## 2019-10-31 NOTE — PROVIDER CONTACT NOTE (OTHER) - SITUATION
patient had hear rate 150 a flutter Pt denies any chest pain no palpations v/s stable
Patient converted to Aflutter with heart rate in 120s to 140s.
Patient with PATs for 4.86 seconds, HR up to 130s

## 2019-10-31 NOTE — PROGRESS NOTE ADULT - ASSESSMENT
88 yr old female with PMH of arthritis and recent diagnosis of hypothyroidism who presented with STOCK; found to be in aflutter with RVR. Transferred to CCU for monitoring following echo finding of pericardial effusion w/ echo evidence of cardiac tamponade.

## 2019-10-31 NOTE — PROGRESS NOTE ADULT - SUBJECTIVE AND OBJECTIVE BOX
Admission date: 10/30  CHIEF COMPLAINT:  HPI:  Pt is a 89 y/o Female with pmhx of arthritis coming in with progressively worsening shortness of breath x1 week. Patient states last week she started to feel short of breath when she would walk around or minimally exert herself, now for the last few days she has been feeling more short of breath with minimal movements. States that she has had some mild cough associated with this shortness of breath; however, denies any mucous production, fever or chills. Denies any chest pain associated with this. Denies ever having symptoms like this before. Does not use oxygen at home. (30 Oct 2019 17:15)    INTERVAL HISTORY:    REVIEW OF SYSTEMS: Denies xxxxxx; all others negative    MEDICATIONS  (STANDING):  heparin  Infusion.  Unit(s)/Hr (11 mL/Hr) IV Continuous <Continuous>  levothyroxine 25 MICROGram(s) Oral daily    MEDICATIONS  (PRN):  heparin  Injectable 5000 Unit(s) IV Push every 6 hours PRN For aPTT less than 40  heparin  Injectable 2500 Unit(s) IV Push every 6 hours PRN For aPTT between 40 - 57      Objective:  ICU Vital Signs Last 24 Hrs  T(C): 37.1 (31 Oct 2019 19:12), Max: 37.1 (31 Oct 2019 19:12)  T(F): 98.8 (31 Oct 2019 19:12), Max: 98.8 (31 Oct 2019 19:12)  HR: 113 (31 Oct 2019 22:00) (86 - 123)  BP: 120/72 (31 Oct 2019 22:00) (88/52 - 150/83)  BP(mean): 90 (31 Oct 2019 22:00) (63 - 94)  ABP: --  ABP(mean): --  RR: 20 (31 Oct 2019 22:00) (18 - 30)  SpO2: 97% (31 Oct 2019 22:00) (93% - 99%)          10-30 @ 07:  -  10-31 @ 07:00  --------------------------------------------------------  IN: 240 mL / OUT: 150 mL / NET: 90 mL    10-31 @ :01  -  10-31 @ 23:19  --------------------------------------------------------  IN: 650 mL / OUT: 900 mL / NET: -250 mL      Daily     Daily Weight in k.5 (31 Oct 2019 05:04)    PHYSICAL EXAM:      Constitutional:    HEENT:    Respiratory:    Cardiovascular:  Access site:    Gastrointestinal:    Genitourinary:    Extremities:    Vascular:    Neurological:    Skin:      TELEMETRY:     EKG:     IMAGING:    Labs:                          12.4   10.27 )-----------( 387      ( 31 Oct 2019 17:55 )             38.6     10-31    128<L>  |  92<L>  |  18  ----------------------------<  147<H>  4.3   |  27  |  0.71    Ca    8.7      31 Oct 2019 20:23  Phos  3.0     10-31  Mg     2.0     10-31    TPro  6.6  /  Alb  2.6<L>  /  TBili  0.4  /  DBili  x   /  AST  59<H>  /  ALT  67<H>  /  AlkPhos  92  10-31    LIVER FUNCTIONS - ( 31 Oct 2019 20:23 )  Alb: 2.6 g/dL / Pro: 6.6 g/dL / ALK PHOS: 92 U/L / ALT: 67 U/L / AST: 59 U/L / GGT: x           PT/INR - ( 30 Oct 2019 15:34 )   PT: 17.4 sec;   INR: 1.49 ratio         PTT - ( 31 Oct 2019 17:55 )  PTT:102.9 sec  CKMB Units: 3.0 ng/mL (10-31 @ 06:47)    Urinalysis Basic - ( 31 Oct 2019 09:41 )    Color: Yellow / Appearance: Slightly Turbid / S.015 / pH: x  Gluc: x / Ketone: Negative  / Bili: Negative / Urobili: <2 mg/dL   Blood: x / Protein: Trace / Nitrite: Negative   Leuk Esterase: Small / RBC: 5 /HPF / WBC 10 /HPF   Sq Epi: x / Non Sq Epi: 16 /HPF / Bacteria: Negative        HEALTH ISSUES - PROBLEM Dx:  Atrial fibrillation: Atrial fibrillation  Prophylactic measure: Prophylactic measure  Arthritis: Arthritis  Hypothyroid: Hypothyroid  Elevated LFTs: Elevated LFTs  Hyponatremia: Hyponatremia  Dyspnea: Dyspnea  Congestive heart disease: Congestive heart disease Admission date: 10/30  CHIEF COMPLAINT:  HPI:  Pt is a 87 y/o Female with pmhx of arthritis coming in with progressively worsening shortness of breath x1 week. Patient states last week she started to feel short of breath when she would walk around or minimally exert herself, now for the last few days she has been feeling more short of breath with minimal movements. States that she has had some mild cough associated with this shortness of breath; however, denies any mucous production, fever or chills. Denies any chest pain associated with this. Denies ever having symptoms like this before. Does not use oxygen at home. Found to have aflutter with RVR, echo revealed thickened pericardium, pericardial effusion 1.7cm anterior to RV, 2.2cm posterior  to LV, echo evidence of pericardial tamponade. Transferred to CCU for monitoring; due to IR drainage tomorrow.     INTERVAL HISTORY: patient stable on arrival to CCU; went for CT chest    REVIEW OF SYSTEMS:    CONSTITUTIONAL: No weakness, fevers or chills  EYES/ENT: No visual changes;  No vertigo or throat pain   NECK: No pain or stiffness  RESPIRATORY: No cough, wheezing, hemoptysis; Occasional shortness of breath on exertion.   CARDIOVASCULAR: No chest pain or palpitations  GASTROINTESTINAL: No abdominal or epigastric pain. No nausea, vomiting, or hematemesis; No diarrhea or constipation. No melena or hematochezia.  GENITOURINARY: No dysuria, frequency or hematuria  NEUROLOGICAL: No numbness or weakness  SKIN: No itching, rashes      MEDICATIONS  (STANDING):  heparin  Infusion.  Unit(s)/Hr (11 mL/Hr) IV Continuous <Continuous>  levothyroxine 25 MICROGram(s) Oral daily    MEDICATIONS  (PRN):  heparin  Injectable 5000 Unit(s) IV Push every 6 hours PRN For aPTT less than 40  heparin  Injectable 2500 Unit(s) IV Push every 6 hours PRN For aPTT between 40 - 57      Objective:  ICU Vital Signs Last 24 Hrs  T(C): 37.1 (31 Oct 2019 19:12), Max: 37.1 (31 Oct 2019 19:12)  T(F): 98.8 (31 Oct 2019 19:12), Max: 98.8 (31 Oct 2019 19:12)  HR: 113 (31 Oct 2019 22:00) (86 - 123)  BP: 120/72 (31 Oct 2019 22:00) (88/52 - 150/83)  BP(mean): 90 (31 Oct 2019 22:00) (63 - 94)  ABP: --  ABP(mean): --  RR: 20 (31 Oct 2019 22:00) (18 - 30)  SpO2: 97% (31 Oct 2019 22:00) (93% - 99%)          10-30 @ 07:01  -  10-31 @ 07:00  --------------------------------------------------------  IN: 240 mL / OUT: 150 mL / NET: 90 mL    10-31 @ 07:  -  10-31 @ 23:19  --------------------------------------------------------  IN: 650 mL / OUT: 900 mL / NET: -250 mL      Daily     Daily Weight in k.5 (31 Oct 2019 05:04)    PHYSICAL EXAM:  General: in no acute distress  Neurology: A&Ox3, nonfocal, GRESHAM x 4  Respiratory: slight expiratory wheeze in upper lobes, otherwise clear; normal rate and effort  CV: irregular rhythm, tachycardic, S1, S2, no murmurs, rubs or gallops  Abdominal: Soft, NT, ND +BS  Extremities: No edema, + peripheral pulses    TELEMETRY: aflutter 100-120s    EKG: afib with rvr 106 bpm      IMAGING:  < from: Transthoracic Echocardiogram (10.31.19 @ 14:48) >  Conclusions:  1. Mitral annular calcification, otherwisenormal mitral  valve. Mild mitral regurgitation.  2. Calcified trileaflet aortic valve with normal opening.  No aortic valve regurgitation seen.  3. Normal left ventricular systolic function. No segmental  wall motion abnormalities.  4. Normal right ventricular size and function.  5. Thickened pericardium. Moderate to large pericardial  effusion. The effusion measures 1.7 cm anterior to the  right ventricle and 2.2cm posterior to the left ventricle.  There is an organized component of the effusion adherent to  the right ventricle measuring 1cm.  There is significant respiratory variation accross the  mitral valve.  There is partial collapse of the right  atrium and right ventricle.   Echocardiographic evidence of pericardial tamponade.  6. Bilateral pleural effusions.    < end of copied text >      Labs:                          12.4   10.27 )-----------( 387      ( 31 Oct 2019 17:55 )             38.6     10-31    128<L>  |  92<L>  |  18  ----------------------------<  147<H>  4.3   |  27  |  0.71    Ca    8.7      31 Oct 2019 20:23  Phos  3.0     10-31  Mg     2.0     10-31    TPro  6.6  /  Alb  2.6<L>  /  TBili  0.4  /  DBili  x   /  AST  59<H>  /  ALT  67<H>  /  AlkPhos  92  10-31    LIVER FUNCTIONS - ( 31 Oct 2019 20:23 )  Alb: 2.6 g/dL / Pro: 6.6 g/dL / ALK PHOS: 92 U/L / ALT: 67 U/L / AST: 59 U/L / GGT: x           PT/INR - ( 30 Oct 2019 15:34 )   PT: 17.4 sec;   INR: 1.49 ratio         PTT - ( 31 Oct 2019 17:55 )  PTT:102.9 sec  CKMB Units: 3.0 ng/mL (10-31 @ 06:47)    Urinalysis Basic - ( 31 Oct 2019 09:41 )    Color: Yellow / Appearance: Slightly Turbid / S.015 / pH: x  Gluc: x / Ketone: Negative  / Bili: Negative / Urobili: <2 mg/dL   Blood: x / Protein: Trace / Nitrite: Negative   Leuk Esterase: Small / RBC: 5 /HPF / WBC 10 /HPF   Sq Epi: x / Non Sq Epi: 16 /HPF / Bacteria: Negative        HEALTH ISSUES - PROBLEM Dx:  Atrial fibrillation: Atrial fibrillation  Prophylactic measure: Prophylactic measure  Arthritis: Arthritis  Hypothyroid: Hypothyroid  Elevated LFTs: Elevated LFTs  Hyponatremia: Hyponatremia  Dyspnea: Dyspnea  Congestive heart disease: Congestive heart disease

## 2019-10-31 NOTE — PROGRESS NOTE ADULT - SUBJECTIVE AND OBJECTIVE BOX
[facility]    Subjective: Patient seen and examined. No new events except as noted.   patient feeling better with breathing   had episodes of afib with RVR IV lopressor given fo rrate control  asymptomatic     REVIEW OF SYSTEMS:    CONSTITUTIONAL: No weakness, fevers or chills  EYES/ENT: No visual changes;  No vertigo or throat pain   NECK: No pain or stiffness  RESPIRATORY: No cough, wheezing, hemoptysis; No shortness of breath  CARDIOVASCULAR: No chest pain or palpitations  GASTROINTESTINAL: No abdominal or epigastric pain. No nausea, vomiting, or hematemesis; No diarrhea or constipation. No melena or hematochezia.  GENITOURINARY: No dysuria, frequency or hematuria  NEUROLOGICAL: No numbness or weakness  SKIN: No itching, burning, rashes, or lesions   All other review of systems is negative unless indicated above.    MEDICATIONS:  MEDICATIONS  (STANDING):  heparin  Infusion.  Unit(s)/Hr (11 mL/Hr) IV Continuous <Continuous>  metoprolol tartrate 25 milliGRAM(s) Oral two times a day      PHYSICAL EXAM:  T(C): 36.7 (10-31-19 @ 17:33), Max: 36.8 (10-30-19 @ 18:45)  HR: 100 (10-31-19 @ 17:33) (90 - 120)  BP: 111/66 (10-31-19 @ 17:33) (108/76 - 152/72)  RR: 18 (10-31-19 @ 17:33) (16 - 18)  SpO2: 98% (10-31-19 @ 17:33) (96% - 98%)  Wt(kg): --  I&O's Summary    30 Oct 2019 07  -  31 Oct 2019 07:00  --------------------------------------------------------  IN: 240 mL / OUT: 150 mL / NET: 90 mL    31 Oct 2019 07:01  -  31 Oct 2019 17:40  --------------------------------------------------------  IN: 420 mL / OUT: 900 mL / NET: -480 mL      Height (cm): 160 (10-30 @ 18:45)  Weight (kg): 64 (10-30 @ 18:45)  BMI (kg/m2): 25 (10-30 @ 18:45)  BSA (m2): 1.67 (10-30 @ 18:45)    Appearance: Normal	  HEENT:   Normal oral mucosa, PERRL, EOMI	  Lymphatic: No lymphadenopathy , no edema  Cardiovascular: S1S2, irregularly irregular   Respiratory: Lungs clear to auscultation, normal effort 	  Gastrointestinal:  Soft, Non-tender, + BS	  Skin: No rashes, No ecchymoses, No cyanosis, warm to touch  Musculoskeletal: Normal range of motion, normal strength  Psychiatry:  Mood & affect appropriate  Ext: No edema      All labs, Imaging and EKGs personally reviewed                           11.   7  )-----------( 307      ( 31 Oct 2019 06:47 )             37.0               10-31    136  |  88<L>  |  13  ----------------------------<  114<H>  3.9   |  30  |  0.58    Ca    8.8      31 Oct 2019 06:47    TPro  6.8  /  Alb  2.9<L>  /  TBili  0.4  /  DBili  x   /  AST  72<H>  /  ALT  76<H>  /  AlkPhos  94  10-31    PT/INR - ( 30 Oct 2019 15:34 )   PT: 17.4 sec;   INR: 1.49 ratio         PTT - ( 30 Oct 2019 15:34 )  PTT:28.6 sec       CARDIAC MARKERS ( 31 Oct 2019 06:47 )  x     / x     / x     / x     / 3.0 ng/mL              Urinalysis Basic - ( 31 Oct 2019 09:41 )    Color: Yellow / Appearance: Slightly Turbid / S.015 / pH: x  Gluc: x / Ketone: Negative  / Bili: Negative / Urobili: <2 mg/dL   Blood: x / Protein: Trace / Nitrite: Negative   Leuk Esterase: Small / RBC: 5 /HPF / WBC 10 /HPF   Sq Epi: x / Non Sq Epi: 16 /HPF / Bacteria: Negative      EKG : A fib with RVR     < from: Xray Chest 1 View AP/PA (10.30.19 @ 15:36) >  IMPRESSION:  Left pleural effusion with associated left basilar   atelectasis.

## 2019-10-31 NOTE — PROGRESS NOTE ADULT - PROBLEM SELECTOR PLAN 1
-hemodynamically stable  -CT chest performed; results pending  -plan for IR drainage tmrw; NPO after midnight ordered

## 2019-10-31 NOTE — CHART NOTE - NSCHARTNOTEFT_GEN_A_CORE
Received call from Cardiology fellow reg; ECHO results. pt in early tamponade on Echo> Pt with stable vitals and with no c/o. Requested to call IR to drain tamponade. Received call back from IR attending; Since pt hemodynamically stable, Will defer procedure for AM and meanwhile pt to have CT chest to evaluate. Cardiology and Attending updated. Pt being transferred to CCU 2 for close monitoring. Grand daughters at bed side and were updated by cardiology.

## 2019-10-31 NOTE — PROGRESS NOTE ADULT - PROBLEM SELECTOR PLAN 2
-continue on heparin drip  -rate controlled  -if rate maintains above 120s, consider digoxin or amiodarone

## 2019-10-31 NOTE — CONSULT NOTE ADULT - SUBJECTIVE AND OBJECTIVE BOX
Patient seen and evaluated at bedside    Chief Complaint:  SOB, substernal chest pressurew    HPI:  Pt is a 87 y/o Female with pmhx of arthritis coming in with progressively worsening shortness of breath x1 week. Patient states last week she started to feel short of breath when she would walk around or minimally exert herself, now for the last few days she has been feeling more short of breath with minimal movements.   PT states symptoms of shortness of breath, orthopnea, PND and substernal chest pressure acutely occurred 1 week ago when she was lying flat at night that started with palpitations.  Pt states she has never had these symptoms before and otherwise has been in her USOPH able to walk long distances only limited by knee pain.  Denies any mucous production, fever or chills. Denies any chest pain associated with this. Denies ever having symptoms like this before.  Has never seen a cardiologist before as she has been healthy otherwise. Denies fevers, chills, n/v, abd pain.  During hospitalization, pt was noted to be in ADHF and started on IV LAsix overnight and went into afib RVR in the AM, rates as high as 150s.  Cardiology was consulted for mgmt of ADHF and afib w/ RVR.      PMHx:   Hypothyroid      PSHx:  denies      Allergies:  No Known Allergies      Home Meds: none    Current Medications:   furosemide   Injectable 40 milliGRAM(s) IV Push daily  heparin  Infusion.  Unit(s)/Hr IV Continuous <Continuous>  heparin  Injectable 5000 Unit(s) IV Push every 6 hours PRN  heparin  Injectable 2500 Unit(s) IV Push every 6 hours PRN  metoprolol tartrate 25 milliGRAM(s) Oral two times a day      FAMILY HISTORY:  non-contributory      Social History:  Smoking History: denies  Alcohol Use: denies  Drug Use: denies    REVIEW OF SYSTEMS:  Constitutional:     [x ] negative [ ] fevers [ ] chills [ ] weight loss [ ] weight gain  HEENT:                  [x ] negative [ ] dry eyes [ ] eye irritation [ ] postnasal drip [ ] nasal congestion  CV:                         [ x] negative  [ ] chest pain [ ] orthopnea [ ] palpitations [ ] murmur  Resp:                     [x ] negative [ ] cough [ ] shortness of breath [ ] dyspnea [ ] wheezing [ ] sputum [ ]hemoptysis  GI:                          [ x] negative [ ] nausea [ ] vomiting [ ] diarrhea [ ] constipation [ ] abd pain [ ] dysphagia   :                        [ x] negative [ ] dysuria [ ] nocturia [ ] hematuria [ ] increased urinary frequency  Musculoskeletal: [x ] negative [ ] back pain [ ] myalgias [ ] arthralgias [ ] fracture  Skin:                       [ x] negative [ ] rash [ ] itch  Neurological:        [ x] negative [ ] headache [ ] dizziness [ ] syncope [ ] weakness [ ] numbness  Psychiatric:           [ x] negative [ ] anxiety [ ] depression  Endocrine:            [ x] negative [ ] diabetes [ ] thyroid problem  Heme/Lymph:      [ x] negative [ ] anemia [ ] bleeding problem  Allergic/Immune: [ x] negative [ ] itchy eyes [ ] nasal discharge [ ] hives [ ] angioedema    [ x] All other systems negative  [ ] Unable to assess ROS due to      Physical Exam:  T(F): 97.8 (10-), Max: 98.4 (10-)  HR: 99 (10-) (73 - 120)  BP: 118/77 (10-) (108/76 - 152/72)  RR: 18 (10-)  SpO2: 96% (10-)  GENERAL: mild resp distress, pleasant  HEAD:  Atraumatic, Normocephalic  ENT: EOMI, PERRLA, conjunctiva and sclera clear, Neck supple, elevated JVP  CHEST/LUNG: bibasilar crackles L > R, mild cardiac wheeze, equal breath sounds bilaterally   BACK: No spinal tenderness  HEART: irregular, tachycardic, distant heart sounds, No murmurs, rubs, or gallops  ABDOMEN: Soft, Nontender, Nondistended; Bowel sounds present  EXTREMITIES:  No clubbing, cyanosis, or edema  PSYCH: Nl behavior, nl affect  NEUROLOGY: AAOx3, non-focal, cranial nerves intact  SKIN: Normal color, No rashes or lesions  LINES:    Cardiovascular Diagnostic Testing:    ECG: Personally reviewed: NSR w/ anterior q-waves unknown age    Echo: Personally reviewed: pending\    CXR: Personally reviewed , pulmonary edema w/ left sided pleural effusion    Telemetry reviewed:  ~630AM went to afib w/ RVR to 150s, converted to NSR ~8am then shortly after converted to afib w/ RVR vs aflutter 2019    Labs: Personally reviewed                        11.9   7.28  )-----------( 307      ( 31 Oct 2019 06:47 )             37.0     10-31    136  |  88<L>  |  13  ----------------------------<  114<H>  3.9   |  30  |  0.58    Ca    8.8      31 Oct 2019 06:47    TPro  6.8  /  Alb  2.9<L>  /  TBili  0.4  /  DBili  x   /  AST  72<H>  /  ALT  76<H>  /  AlkPhos  94  10-31    PT/INR - ( 30 Oct 2019 15:34 )   PT: 17.4 sec;   INR: 1.49 ratio         PTT - ( 30 Oct 2019 15:34 )  PTT:28.6 sec  Serum Pro-Brain Natriuretic Peptide: 1390 pg/mL (10-30 @ 15:34)    Total Cholesterol: 133  LDL: 70  HDL: 43  T    Hemoglobin A1C, Whole Blood: 5.6 % (10-31 @ 08:54)    Thyroid Stimulating Hormone, Serum: 2.85 uIU/mL (10-31 @ 08:38)

## 2019-10-31 NOTE — CONSULT NOTE ADULT - ASSESSMENT
87 yo F w/ PMH arthritis who came in for evaluation of SOB X1 week. Found to have new onset heart failure and Afib. Nephrology is consulted for hyponatremia    Hyponatremia  - currently appears euvolemic but may have been hypervolemic hyponatremia yesterday which improved w/ lasix   - Priscila and UCl from yesterday consistent w/ hypervolemic state  - Uosm this AM is elevated w/ normal Priscila, pt states she feels thirsty, ? appropriate ADH response to rising serum Na and thirst vs. SIADH of unknown etiology    - repeat BMP, Priscila, UCl, Uosm, Uurea, and UA simultaneously if she has recurrent hyponatremia on repeat testing    - encourage PO intake  - 1L fluid restricted diet  - low salt diet    - Na should not change by more than 8 mEq in 24 hrs

## 2019-11-01 DIAGNOSIS — J90 PLEURAL EFFUSION, NOT ELSEWHERE CLASSIFIED: ICD-10-CM

## 2019-11-01 DIAGNOSIS — I50.31 ACUTE DIASTOLIC (CONGESTIVE) HEART FAILURE: ICD-10-CM

## 2019-11-01 DIAGNOSIS — I31.3 PERICARDIAL EFFUSION (NONINFLAMMATORY): ICD-10-CM

## 2019-11-01 DIAGNOSIS — I50.1 LEFT VENTRICULAR FAILURE, UNSPECIFIED: ICD-10-CM

## 2019-11-01 DIAGNOSIS — E03.9 HYPOTHYROIDISM, UNSPECIFIED: ICD-10-CM

## 2019-11-01 DIAGNOSIS — I48.92 UNSPECIFIED ATRIAL FLUTTER: ICD-10-CM

## 2019-11-01 LAB
ALBUMIN SERPL ELPH-MCNC: 2.8 G/DL — LOW (ref 3.3–5)
ALP SERPL-CCNC: 91 U/L — SIGNIFICANT CHANGE UP (ref 40–120)
ALT FLD-CCNC: 66 U/L — HIGH (ref 10–45)
ANION GAP SERPL CALC-SCNC: 11 MMOL/L — SIGNIFICANT CHANGE UP (ref 5–17)
APTT BLD: 64.6 SEC — HIGH (ref 27.5–36.3)
APTT BLD: 82.9 SEC — HIGH (ref 27.5–36.3)
APTT BLD: 96.2 SEC — HIGH (ref 27.5–36.3)
AST SERPL-CCNC: 64 U/L — HIGH (ref 10–40)
BASOPHILS # BLD AUTO: 0.05 K/UL — SIGNIFICANT CHANGE UP (ref 0–0.2)
BASOPHILS NFR BLD AUTO: 0.5 % — SIGNIFICANT CHANGE UP (ref 0–2)
BILIRUB SERPL-MCNC: 0.3 MG/DL — SIGNIFICANT CHANGE UP (ref 0.2–1.2)
BUN SERPL-MCNC: 21 MG/DL — SIGNIFICANT CHANGE UP (ref 7–23)
CALCIUM SERPL-MCNC: 8.7 MG/DL — SIGNIFICANT CHANGE UP (ref 8.4–10.5)
CHLORIDE SERPL-SCNC: 92 MMOL/L — LOW (ref 96–108)
CO2 SERPL-SCNC: 28 MMOL/L — SIGNIFICANT CHANGE UP (ref 22–31)
CREAT SERPL-MCNC: 0.69 MG/DL — SIGNIFICANT CHANGE UP (ref 0.5–1.3)
EOSINOPHIL # BLD AUTO: 0.21 K/UL — SIGNIFICANT CHANGE UP (ref 0–0.5)
EOSINOPHIL NFR BLD AUTO: 2 % — SIGNIFICANT CHANGE UP (ref 0–6)
GLUCOSE SERPL-MCNC: 123 MG/DL — HIGH (ref 70–99)
HCT VFR BLD CALC: 36.7 % — SIGNIFICANT CHANGE UP (ref 34.5–45)
HGB BLD-MCNC: 12.1 G/DL — SIGNIFICANT CHANGE UP (ref 11.5–15.5)
IMM GRANULOCYTES NFR BLD AUTO: 0.2 % — SIGNIFICANT CHANGE UP (ref 0–1.5)
LYMPHOCYTES # BLD AUTO: 2.11 K/UL — SIGNIFICANT CHANGE UP (ref 1–3.3)
LYMPHOCYTES # BLD AUTO: 20.4 % — SIGNIFICANT CHANGE UP (ref 13–44)
MAGNESIUM SERPL-MCNC: 2.1 MG/DL — SIGNIFICANT CHANGE UP (ref 1.6–2.6)
MCHC RBC-ENTMCNC: 29.1 PG — SIGNIFICANT CHANGE UP (ref 27–34)
MCHC RBC-ENTMCNC: 33 GM/DL — SIGNIFICANT CHANGE UP (ref 32–36)
MCV RBC AUTO: 88.2 FL — SIGNIFICANT CHANGE UP (ref 80–100)
MONOCYTES # BLD AUTO: 0.97 K/UL — HIGH (ref 0–0.9)
MONOCYTES NFR BLD AUTO: 9.4 % — SIGNIFICANT CHANGE UP (ref 2–14)
NEUTROPHILS # BLD AUTO: 6.98 K/UL — SIGNIFICANT CHANGE UP (ref 1.8–7.4)
NEUTROPHILS NFR BLD AUTO: 67.5 % — SIGNIFICANT CHANGE UP (ref 43–77)
NRBC # BLD: 0 /100 WBCS — SIGNIFICANT CHANGE UP (ref 0–0)
PHOSPHATE SERPL-MCNC: 2.9 MG/DL — SIGNIFICANT CHANGE UP (ref 2.5–4.5)
PLATELET # BLD AUTO: 349 K/UL — SIGNIFICANT CHANGE UP (ref 150–400)
POTASSIUM SERPL-MCNC: 4.1 MMOL/L — SIGNIFICANT CHANGE UP (ref 3.5–5.3)
POTASSIUM SERPL-SCNC: 4.1 MMOL/L — SIGNIFICANT CHANGE UP (ref 3.5–5.3)
PROT SERPL-MCNC: 6.6 G/DL — SIGNIFICANT CHANGE UP (ref 6–8.3)
RAPID RVP RESULT: SIGNIFICANT CHANGE UP
RBC # BLD: 4.16 M/UL — SIGNIFICANT CHANGE UP (ref 3.8–5.2)
RBC # FLD: 13.5 % — SIGNIFICANT CHANGE UP (ref 10.3–14.5)
SODIUM SERPL-SCNC: 131 MMOL/L — LOW (ref 135–145)
WBC # BLD: 10.34 K/UL — SIGNIFICANT CHANGE UP (ref 3.8–10.5)
WBC # FLD AUTO: 10.34 K/UL — SIGNIFICANT CHANGE UP (ref 3.8–10.5)

## 2019-11-01 PROCEDURE — 71250 CT THORAX DX C-: CPT | Mod: 26,GC

## 2019-11-01 PROCEDURE — 93010 ELECTROCARDIOGRAM REPORT: CPT | Mod: 76

## 2019-11-01 PROCEDURE — 99291 CRITICAL CARE FIRST HOUR: CPT

## 2019-11-01 RX ORDER — FUROSEMIDE 40 MG
20 TABLET ORAL ONCE
Refills: 0 | Status: COMPLETED | OUTPATIENT
Start: 2019-11-01 | End: 2019-11-01

## 2019-11-01 RX ORDER — HEPARIN SODIUM 5000 [USP'U]/ML
1000 INJECTION INTRAVENOUS; SUBCUTANEOUS
Qty: 25000 | Refills: 0 | Status: DISCONTINUED | OUTPATIENT
Start: 2019-11-01 | End: 2019-11-02

## 2019-11-01 RX ORDER — HEPARIN SODIUM 5000 [USP'U]/ML
2500 INJECTION INTRAVENOUS; SUBCUTANEOUS EVERY 6 HOURS
Refills: 0 | Status: DISCONTINUED | OUTPATIENT
Start: 2019-11-01 | End: 2019-11-02

## 2019-11-01 RX ORDER — HEPARIN SODIUM 5000 [USP'U]/ML
5000 INJECTION INTRAVENOUS; SUBCUTANEOUS EVERY 6 HOURS
Refills: 0 | Status: DISCONTINUED | OUTPATIENT
Start: 2019-11-01 | End: 2019-11-02

## 2019-11-01 RX ADMIN — HEPARIN SODIUM 1000 UNIT(S)/HR: 5000 INJECTION INTRAVENOUS; SUBCUTANEOUS at 16:56

## 2019-11-01 RX ADMIN — Medication 20 MILLIGRAM(S): at 17:36

## 2019-11-01 RX ADMIN — HEPARIN SODIUM 1000 UNIT(S)/HR: 5000 INJECTION INTRAVENOUS; SUBCUTANEOUS at 01:13

## 2019-11-01 RX ADMIN — HEPARIN SODIUM 1000 UNIT(S)/HR: 5000 INJECTION INTRAVENOUS; SUBCUTANEOUS at 08:38

## 2019-11-01 RX ADMIN — Medication 25 MICROGRAM(S): at 06:38

## 2019-11-01 NOTE — CONSULT NOTE ADULT - SUBJECTIVE AND OBJECTIVE BOX
PULMONARY CONSULT    HPI: 87 y/o F with PMH of hypothyroid, uterine CA s/p hysterectomy (), s/p hernia repair, macular degeneration, history of syncopal episodes (5-6 years ago) and resolved HTN presents with 3 week history of progressive dyspnea. Family states she started with viral URI symptoms 3 weeks ago with cough, congestion, rhinorrhea which progressed to wheezing and eventually was unable to speak in full sentences d/t dyspnea. She was found to have moderate-large pericardial effusion with evidence of tamponade physiology with L>R effusions. Currently on 2L NC, 99% and breathing comfortably. Per family at bedside she does not like to go to doctors, has never had a colonoscopy, last mammography was several years ago. No history of rheumatologic illness in family.         PAST MEDICAL & SURGICAL HISTORY:  Hypothyroid    Allergies    No Known Allergies    Intolerances      FAMILY HISTORY: No family history of rheumatologic illness    Social history: Never smoker  Retired schoolteacher    Review of Systems:  CONSTITUTIONAL: No fever, chills, or fatigue  EYES: No eye pain, visual disturbances, or discharge  ENMT:  No difficulty hearing, tinnitus, vertigo; No sinus or throat pain  NECK: No pain or stiffness  RESPIRATORY: Per above  CARDIOVASCULAR: No chest pain, palpitations, dizziness, or leg swelling  GASTROINTESTINAL: No abdominal or epigastric pain. No nausea, vomiting, or hematemesis; No diarrhea or constipation. No melena or hematochezia.  GENITOURINARY: No dysuria, frequency, hematuria, or incontinence  NEUROLOGICAL: No headaches, memory loss, loss of strength, numbness, or tremors  SKIN: No itching, burning, rashes, or lesions   MUSCULOSKELETAL: No joint pain or swelling; No muscle, back, or extremity pain  PSYCHIATRIC: No depression, anxiety, mood swings, or difficulty sleeping      Medications:  MEDICATIONS  (STANDING):  heparin  Infusion.  Unit(s)/Hr (11 mL/Hr) IV Continuous <Continuous>  levothyroxine 25 MICROGram(s) Oral daily    MEDICATIONS  (PRN):  heparin  Injectable 5000 Unit(s) IV Push every 6 hours PRN For aPTT less than 40  heparin  Injectable 2500 Unit(s) IV Push every 6 hours PRN For aPTT between 40 - 57            Vital Signs Last 24 Hrs  T(C): 36.3 (2019 07:00), Max: 37.1 (31 Oct 2019 19:12)  T(F): 97.4 (2019 07:00), Max: 98.8 (31 Oct 2019 19:12)  HR: 79 (2019 11:00) (78 - 123)  BP: 123/61 (2019 11:00) (88/52 - 139/61)  BP(mean): 88 (2019 11:00) (63 - 94)  RR: 17 (2019 11:00) (13 - 31)  SpO2: 99% (2019 11:) (93% - 99%) on 2L NC            10-31 @ 07:  -   @ 07:00  --------------------------------------------------------  IN: 730 mL / OUT: 1000 mL / NET: -270 mL          LABS:                        12.1   10.34 )-----------( 349      ( 2019 00:41 )             36.7     11    131<L>  |  92<L>  |  21  ----------------------------<  123<H>  4.1   |  28  |  0.69    Ca    8.7      2019 00:41  Phos  2.9       Mg     2.1         TPro  6.6  /  Alb  2.8<L>  /  TBili  0.3  /  DBili  x   /  AST  64<H>  /  ALT  66<H>  /  AlkPhos  91  11      CARDIAC MARKERS ( 31 Oct 2019 06:47 )  x     / x     / x     / x     / 3.0 ng/mL      CAPILLARY BLOOD GLUCOSE        PT/INR - ( 30 Oct 2019 15:34 )   PT: 17.4 sec;   INR: 1.49 ratio         PTT - ( 2019 07:00 )  PTT:82.9 sec  Urinalysis Basic - ( 31 Oct 2019 09:41 )    Color: Yellow / Appearance: Slightly Turbid / S.015 / pH: x  Gluc: x / Ketone: Negative  / Bili: Negative / Urobili: <2 mg/dL   Blood: x / Protein: Trace / Nitrite: Negative   Leuk Esterase: Small / RBC: 5 /HPF / WBC 10 /HPF   Sq Epi: x / Non Sq Epi: 16 /HPF / Bacteria: Negative        Serum Pro-Brain Natriuretic Peptide: 1390 pg/mL (10-30-19 @ 15:34)          Physical Examination:    General: No acute distress.      HEENT: Pupils equal, reactive to light.  Symmetric.    PULM: Decreased BS at bases    CVS: S1, S2    ABD: Soft, nondistended, nontender, normoactive bowel sounds, no masses    EXT: No edema, nontender    SKIN: Warm and well perfused, no rashes noted.    NEURO: Alert, oriented, interactive, nonfocal    RADIOLOGY REVIEWED  CT chest: L>R effusions with LLL atelectasis   Pericardial effusion     TTE: < from: Transthoracic Echocardiogram (10.31.19 @ 14:48) >  Conclusions:  1. Mitral annular calcification, otherwisenormal mitral  valve. Mild mitral regurgitation.  2. Calcified trileaflet aortic valve with normal opening.  No aortic valve regurgitation seen.  3. Normal left ventricular systolic function. No segmental  wall motion abnormalities.  4. Normal right ventricular size and function.  5. Thickened pericardium. Moderate to large pericardial  effusion. The effusion measures 1.7 cm anterior to the  right ventricle and 2.2cm posterior to the left ventricle.  There is an organized component of the effusion adherent to  the right ventricle measuring 1cm.  There is significant respiratory variation accross the  mitral valve.  There is partial collapse of the right  atrium and right ventricle.   Echocardiographic evidence of pericardial tamponade.  6. Bilateral pleural effusions.    < end of copied text >

## 2019-11-01 NOTE — PHYSICAL THERAPY INITIAL EVALUATION ADULT - ADDITIONAL COMMENTS
Pt lives with spouse and 2 sons in private home with 5 steps to enter (+) HR, 1 flight to 2nd floor (+) HR. Pt reports her spouse has dementia and requires assist.

## 2019-11-01 NOTE — PROGRESS NOTE ADULT - SUBJECTIVE AND OBJECTIVE BOX
PCP Jose M Adams requests me as the medical attending     Patient seen and examined at bedside  c/o stable left sided chest discomfort and sob  Case discussed with medical team    HPI:  Pt is a 87 y/o Female with pmhx of arthritis coming in with progressively worsening shortness of breath x1 week. Patient states last week she started to feel short of breath when she would walk around or minimally exert herself, now for the last few days she has been feeling more short of breath with minimal movements. States that she has had some mild cough associated with this shortness of breath; however, denies any mucous production, fever or chills. Denies any chest pain associated with this. Denies ever having symptoms like this before. Does not use oxygen at home. (30 Oct 2019 17:15)      PAST MEDICAL & SURGICAL HISTORY:  Hypothyroid      No Known Allergies       MEDICATIONS  (STANDING):  heparin  Infusion.  Unit(s)/Hr (11 mL/Hr) IV Continuous <Continuous>  levothyroxine 25 MICROGram(s) Oral daily    MEDICATIONS  (PRN):  heparin  Injectable 5000 Unit(s) IV Push every 6 hours PRN For aPTT less than 40  heparin  Injectable 2500 Unit(s) IV Push every 6 hours PRN For aPTT between 40 - 57      REVIEW OF SYSTEMS:  CONSTITUTIONAL: (+) malaise.   EYES: No acute change in vision   ENT:  No tinnitus  NECK: No stiffness  RESPIRATORY: sob. schmitt.  No hemoptysis  CARDIOVASCULAR: chest discomfort left sided. no palpitations, syncope  GASTROINTESTINAL: No hematemesis, diarrhea, melena, or hematochezia.  GENITOURINARY: No hematuria  NEUROLOGICAL: No headaches  LYMPH Nodes: No enlarged glands  ENDOCRINE: No heat or cold intolerance	    T(C): 36.3 (19 @ 07:00), Max: 37.1 (10-31-19 @ 19:12)  HR: 81 (19 @ 12:00) (78 - 123)  BP: 119/56 (19 @ 12:00) (88/52 - 139/61)  RR: 22 (19 @ 12:00) (13 - 31)  SpO2: 99% (19 @ 12:00) (93% - 99%)    PHYSICAL EXAMINATION:   Constitutional: ill appearing.   HEENT: NC, AT  Neck:  Supple  Respiratory:  Adequate airflow b/l. Not using accessory muscles of respiration.  Cardiovascular: decreased heart sounds. S1 & S2 intact, 2+ radial pulses b/l  Gastrointestinal: Soft, NT, ND, normoactive b.s., no organomegaly/RT/rigidity  Extremities: WWP  Neurological:  Alert and awake.  No acute focal motor deficits. Crude sensation intact.     Labs and imaging reviewed    LABS:                        12.1   10.34 )-----------( 349      ( 2019 00:41 )             36.7     11    131<L>  |  92<L>  |  21  ----------------------------<  123<H>  4.1   |  28  |  0.69    Ca    8.7      2019 00:41  Phos  2.9       Mg     2.1         TPro  6.6  /  Alb  2.8<L>  /  TBili  0.3  /  DBili  x   /  AST  64<H>  /  ALT  66<H>  /  AlkPhos  91      CARDIAC MARKERS ( 31 Oct 2019 06:47 )  x     / x     / x     / x     / 3.0 ng/mL      PT/INR - ( 30 Oct 2019 15:34 )   PT: 17.4 sec;   INR: 1.49 ratio         PTT - ( 2019 07:00 )  PTT:82.9 sec  Urinalysis Basic - ( 31 Oct 2019 09:41 )    Color: Yellow / Appearance: Slightly Turbid / S.015 / pH: x  Gluc: x / Ketone: Negative  / Bili: Negative / Urobili: <2 mg/dL   Blood: x / Protein: Trace / Nitrite: Negative   Leuk Esterase: Small / RBC: 5 /HPF / WBC 10 /HPF   Sq Epi: x / Non Sq Epi: 16 /HPF / Bacteria: Negative      CAPILLARY BLOOD GLUCOSE            LIVER FUNCTIONS - ( 2019 00:41 )  Alb: 2.8 g/dL / Pro: 6.6 g/dL / ALK PHOS: 91 U/L / ALT: 66 U/L / AST: 64 U/L / GGT: x               RADIOLOGY & ADDITIONAL STUDIES:

## 2019-11-01 NOTE — PHYSICAL THERAPY INITIAL EVALUATION ADULT - PLANNED THERAPY INTERVENTIONS, PT EVAL
balance training/bed mobility training/gait training/transfer training/GOAL: Pt will negotiate 12 stairs with 1 HR and step to pattern with (I) in 2 weeks.

## 2019-11-01 NOTE — CONSULT NOTE ADULT - PROBLEM SELECTOR RECOMMENDATION 2
L>R pleural effusion likely pleuro-pericardial effusion   -Keep sp02>92% on supplemental oxygen L>R pleural effusion likely pleuro-pericardial effusion with atelectasis  -Keep sp02>92% on supplemental oxygen

## 2019-11-01 NOTE — PROGRESS NOTE ADULT - SUBJECTIVE AND OBJECTIVE BOX
Vascular & Interventional Radiology Pre-Procedure Note    Procedure Name: Pericardial Drain Placement    HPI: 88y Female with a small to moderate pericardial effusion and cardiac tamponade on echocardiogram.     Allergies:   Medications:  furosemide   Injectable: 20 milliGRAM(s) IV Push (10-30 @ 17:31)  furosemide   Injectable: 40 milliGRAM(s) IV Push (10-31 @ 07:34)  heparin  Infusion.: 1000 Unit(s)/Hr IV Continuous (11-01 @ 01:13)  heparin  Injectable: 5000 Unit(s) SubCutaneous (10-31 @ 05:45)  metoprolol tartrate: 25 milliGRAM(s) Oral (10-31 @ 09:04)  metoprolol tartrate Injectable: 2.5 milliGRAM(s) IV Push (10-31 @ 07:09)  metoprolol tartrate Injectable: 5 milliGRAM(s) IV Push (10-31 @ 09:02)    Data:    T(C): 36.3  HR: 78  BP: 130/60  RR: 21  SpO2: 100%    -WBC 10.34 / HgB 12.1 / Hct 36.7 / Plt 349  -Na 131 / Cl 92 / BUN 21 / Glucose 123  -K 4.1 / CO2 28 / Cr 0.69  -ALT 66 / Alk Phos 91 / T.Bili 0.3  -INR1.49    Imaging: CT Chest on 10/31/19    Plan:   -88y Female presents for pericardial drain placement.   -Risks/Benefits/alternatives explained with the patient and/or healthcare proxy and witnessed informed consent obtained.

## 2019-11-01 NOTE — PROGRESS NOTE ADULT - SUBJECTIVE AND OBJECTIVE BOX
====================  CCU MIDNIGHT ROUNDS  ====================    KATHY HARVEY  [id number]  Patient is a 88y old  Female who presents with a chief complaint of SOB, CHF exacerbation (01 Nov 2019 15:43)      ====================  SUMMARY:  ====================    88-year old woman with presenting symptom of dyspnea  Found to be hypothyroid with pericardial effusion and paroxysmal AF  Pericardial effusion not found to require percutaneous drainage ("small" reportedly on CT)  Normal LV systolic function    ====================  NEW EVENTS:  ====================    None  Telemetry: NSR with frequent PVCs; rate 84/min  Sleeping comfortably, lying flat. 	    ====================  VITALS (Last 12 hrs):  ====================    T(C): 36.7 (11-01-19 @ 23:00), Max: 36.7 (11-01-19 @ 19:00)  T(F): 98.1 (11-01-19 @ 23:00), Max: 98.1 (11-01-19 @ 19:00)  HR: 88 (11-01-19 @ 23:00) (78 - 90)  BP: 143/66 (11-01-19 @ 23:00) (106/53 - 156/72)  BP(mean): 95 (11-01-19 @ 23:00) (77 - 103)  ABP: --  ABP(mean): --  RR: 0 (11-01-19 @ 23:00) (0 - 27)  SpO2: 100% (11-01-19 @ 23:00) (97% - 100%)  Wt(kg): --  CVP(mm Hg): --  CVP(cm H2O): --  CO: --  CI: --  PA: --  PA(mean): --  PCWP: --  SVR: --  PVR: --    I&O's Summary    31 Oct 2019 07:01  -  01 Nov 2019 07:00  --------------------------------------------------------  IN: 730 mL / OUT: 1000 mL / NET: -270 mL    01 Nov 2019 07:01  -  01 Nov 2019 23:35  --------------------------------------------------------  IN: 110 mL / OUT: 350 mL / NET: -240 mL            ====================  NEW LABS:  ====================                          12.1   10.34 )-----------( 349      ( 01 Nov 2019 00:41 )             36.7     11-01    131<L>  |  92<L>  |  21  ----------------------------<  123<H>  4.1   |  28  |  0.69    Ca    8.7      01 Nov 2019 00:41  Phos  2.9     11-01  Mg     2.1     11-01    TPro  6.6  /  Alb  2.8<L>  /  TBili  0.3  /  DBili  x   /  AST  64<H>  /  ALT  66<H>  /  AlkPhos  91  11-01    PTT - ( 01 Nov 2019 07:00 )  PTT:82.9 sec            ====================  PLAN:  ====================    Continue levothyroxine  Can likely be switched from heparin to a direct oral anticoagulant tomorrow, as it appears that no invasive approach to the effusion will be required.       Delgado Juares MD, [facility]  [id number]

## 2019-11-01 NOTE — PROGRESS NOTE ADULT - SUBJECTIVE AND OBJECTIVE BOX
Interventional Radiology    Pericardial drainage aborted as there has been significant decrease in size of pericardial effusion. Given patients current hemodynamic instability it was decided to abort the procedure for the time being. This was discussed with  by  on 11/01/19 at [id number]    Michael Plascencia MD  PGY-6, Interventional Radiology

## 2019-11-01 NOTE — PROGRESS NOTE ADULT - ASSESSMENT
88F with arthritis and hypothyroidism presented with STOCK, found to be in Aflutter with RVR. Transferred to CCU for monitoring after echo evidence with moderate pericardial effusion with early signs of tamponade.       Pericardial effusion with early signs of tamponade on TTE. Hemodynamically stable.  - CT chest performed; results pending  - Plan for drainage with IR today     Atrial flutter, now in SR. CHADS-VASc at least 3.   - Cont on heparin drip, can switch to NOAC after pericardiocentesis     Hyponatremia, improving. DDx hypervolemic vs SIADH. Renal following. Currently euvolemic on exam.  - Cont to monitor    Hypothyroidism.  - Continue levothyroxine

## 2019-11-01 NOTE — CONSULT NOTE ADULT - ASSESSMENT
89 y/o F with PMH of hypothyroid, uterine CA s/p hysterectomy (1987), s/p hernia repair, macular degeneration, history of syncopal episodes (5-6 years ago) and resolved HTN presents with 3 week history of progressive dyspnea. Family states she started with viral URI symptoms 3 weeks ago with cough, congestion, rhinorrhea which progressed to wheezing and eventually was unable to speak in full sentences d/t dyspnea. She was found to have moderate-large pericardial effusion with evidence of tamponade physiology with L>R effusions. Currently on 2L NC, 99% and breathing comfortably. Per family at bedside she does not like to go to doctors, has never had a colonoscopy, last mammography was several years ago. No history of rheumatologic illness in family.

## 2019-11-01 NOTE — PROGRESS NOTE ADULT - SUBJECTIVE AND OBJECTIVE BOX
[facility]   MD Ceci Posey D.O. Fatima Sheikh, D.O. Ruoru Wong, PA    From 7 AM - 5 PM:  OFFICE: [phone]  Dr. Frey cell: [phone]  Dr. Gaona cell: [phone]   cell: [phone]  CRYSTAL Rosales cell: [phone]    From 5 PM - 7 AM: Answering Service: [phone]        RENAL FOLLOW UP NOTE  --------------------------------------------------------------------------------  HPI: Pt seen and examined. Has no complaints this AM except for feeling thirsty. Denies any SOB, cough, CP, N/V, palpitations, urinary symptoms. Moved to CCU2 overnight due to concern for early tamponade noted on TTE yesterday.        PAST HISTORY  --------------------------------------------------------------------------------  No significant changes to PMH, PSH, FHx, SHx, unless otherwise noted    ALLERGIES & MEDICATIONS  --------------------------------------------------------------------------------  Allergies    No Known Allergies    Intolerances      Standing Inpatient Medications  heparin  Infusion.  Unit(s)/Hr IV Continuous <Continuous>  levothyroxine 25 MICROGram(s) Oral daily    PRN Inpatient Medications  heparin  Injectable 5000 Unit(s) IV Push every 6 hours PRN  heparin  Injectable 2500 Unit(s) IV Push every 6 hours PRN      REVIEW OF SYSTEMS  --------------------------------------------------------------------------------  General: no fever  CVS: no chest pain  RESP: no sob, no cough  ABD: no abdominal pain  : no dysuria,  MSK: no edema     VITALS/PHYSICAL EXAM  --------------------------------------------------------------------------------  T(C): 36.3 (11-01-19 @ 07:00), Max: 37.1 (10-31-19 @ 19:12)  HR: 81 (11-01-19 @ 12:00) (78 - 123)  BP: 119/56 (11-01-19 @ 12:00) (88/52 - 139/61)  RR: 22 (11-01-19 @ 12:00) (13 - 31)  SpO2: 99% (11-01-19 @ 12:00) (93% - 99%)  Wt(kg): --  Height (cm): 160 (10-30-19 @ 18:45)  Weight (kg): 64 (10-30-19 @ 18:45)  BMI (kg/m2): 25 (10-30-19 @ 18:45)  BSA (m2): 1.67 (10-30-19 @ 18:45)      10-31-19 @ 07:01  -  11-01-19 @ 07:00  --------------------------------------------------------  IN: 730 mL / OUT: 1000 mL / NET: -270 mL    11-01-19 @ 07:01  -  11-01-19 @ 12:36  --------------------------------------------------------  IN: 40 mL / OUT: 0 mL / NET: 40 mL      Physical Exam:  	Gen: NAD  	HEENT: MM dry  	Pulm: CTA B/L  	CV: S1S2, + murmur  	Abd: Soft, +BS  	Ext: No LE edema B/L              Neuro: Awake   	Skin: Warm and Dry       LABS/STUDIES  --------------------------------------------------------------------------------              12.1   10.34 >-----------<  349      [11-01-19 @ 00:41]              36.7     131  |  92  |  21  ----------------------------<  123      [11-01-19 @ 00:41]  4.1   |  28  |  0.69        Ca     8.7     [11-01-19 @ 00:41]      Mg     2.1     [11-01-19 @ 00:41]      Phos  2.9     [11-01-19 @ 00:41]    TPro  6.6  /  Alb  2.8  /  TBili  0.3  /  DBili  x   /  AST  64  /  ALT  66  /  AlkPhos  91  [11-01-19 @ 00:41]    PT/INR: PT 17.4 , INR 1.49       [10-30-19 @ 15:34]  PTT: 82.9       [11-01-19 @ 07:00]    Uric acid 3.4      [10-30-19 @ 17:45]    Creatinine Trend:  SCr 0.69 [11-01 @ 00:41]  SCr 0.71 [10-31 @ 20:23]  SCr 0.58 [10-31 @ 06:47]  SCr 0.61 [10-30 @ 15:34]    Urinalysis - [10-31-19 @ 09:41]      Color Yellow / Appearance Slightly Turbid / SG 1.015 / pH 6.0      Gluc Negative / Ketone Negative  / Bili Negative / Urobili <2 mg/dL       Blood Moderate / Protein Trace / Leuk Est Small / Nitrite Negative      RBC 5 / WBC 10 / Hyaline 11 / Gran 2 / Sq Epi  / Non Sq Epi 16 / Bacteria Negative    Urine Sodium 42      [10-31-19 @ 06:49]  Urine Chloride 66      [10-31-19 @ 06:49]  Urine Osmolality 466      [10-31-19 @ 09:41]    HbA1c 5.6      [10-31-19 @ 08:54]  TSH 2.85      [10-31-19 @ 08:38]  Lipid: chol 133, TG 98, HDL 43, LDL 70      [10-31-19 @ 08:34]

## 2019-11-01 NOTE — PHYSICAL THERAPY INITIAL EVALUATION ADULT - DISCHARGE DISPOSITION, PT EVAL
Home with home PT for gait, balance, & strength training and to return pt to baseline functional mobility status. Rolling walker with ambulation (pt does not own). Supervision/assist with mobility skills at this time (pt states her 2 sons can provide)./home w/ home PT

## 2019-11-01 NOTE — CHART NOTE - NSCHARTNOTEFT_GEN_A_CORE
Patient converted to normal sinus rhythm with PVCs around 1am. /64.  EKG performed.    Yanni Denton NP  [id number]

## 2019-11-01 NOTE — PHYSICAL THERAPY INITIAL EVALUATION ADULT - GENERAL OBSERVATIONS, REHAB EVAL
Pt received semi-supine in bed, (+) 2L NCO2, IV heparin, primafit cath, NAD. Pt alert, agreeable to PT eval.

## 2019-11-01 NOTE — PROGRESS NOTE ADULT - ASSESSMENT
89 yo F w/ PMH arthritis who came in for evaluation of SOB X1 week. Found to have new onset heart failure and Afib. Moved to CCU2 due to concern for early tamponade on TTE 10/31/19. Pending IR drainage of pericardial effusion today. Nephrology is consulted for hyponatremia    Hyponatremia  - currently appears hypovolemic   - Priscila and UCl elevated from lasix  - Uosmelevated w/ normal Priscila, pt states she feels thirsty, likely appropriate ADH response to thirst/hypovolemia    - repeat BMP, Priscila, UCl, Uosm, Uurea, and UA simultaneously karol AM     - encourage PO solute intake when NPO status is lifted  - 1L fluid restricted diet  - low salt diet    - Na should not change by more than 8 mEq in 24 hrs    - would not give diuretic today as she is hypovolemic clinically and has signs of early tamponade on TTE (hypovolemia can cause tamponade/cardiovascular collapse). Consider starting IV NS @ 50 mL/hr to supplement volume status until she has pericardial drain placed by IR.

## 2019-11-01 NOTE — CONSULT NOTE ADULT - PROBLEM SELECTOR RECOMMENDATION 9
Plan for drainage in IR today   -Please send cytology, pericardial fluid analysis   -Check serum LDH  -Check RVP Plan for drainage in IR today   -Please send cytology, pericardial fluid analysis   -Check RVP

## 2019-11-01 NOTE — PROGRESS NOTE ADULT - SUBJECTIVE AND OBJECTIVE BOX
For all Cardiology service contact information, go to [email or web address] and use "[email or web address]" to login.      Overnight Events: Denies SOB, CP, dizziness. Converted from atrial flutter to NSR around 1am.     Medications:  heparin  Infusion.  Unit(s)/Hr IV Continuous <Continuous>  heparin  Injectable 5000 Unit(s) IV Push every 6 hours PRN  heparin  Injectable 2500 Unit(s) IV Push every 6 hours PRN  levothyroxine 25 MICROGram(s) Oral daily      REVIEW OF SYSTEMS:  CONSTITUTIONAL: No weakness, fevers or chills  EYES/ENT: No visual changes;  No vertigo or throat pain   NECK: No pain or stiffness  RESPIRATORY: No cough, wheezing, hemoptysis; No shortness of breath  CARDIOVASCULAR: No chest pain or palpitations  GASTROINTESTINAL: No abdominal pain. No nausea, vomiting, or hematemesis; No diarrhea or constipation. No melena or hematochezia.  GENITOURINARY: No dysuria, frequency or hematuria  NEUROLOGICAL: No numbness or weakness  SKIN: No itching or rash  All other review of systems is negative unless indicated above    Vitals:  T(F): 97.4 (), Max: 98.8 (10-31)  HR: 78 () (78 - 123)  BP: 110/59 () (88/52 - 131/58)  RR: 24 ()  SpO2: 94% ()  I&O's Summary    31 Oct 2019 07:01  -  2019 07:00  --------------------------------------------------------  IN: 730 mL / OUT: 1000 mL / NET: -270 mL      Physical Exam:  Appearance: No acute distress; well appearing  Eyes: PERRL, EOMI, pink conjunctiva  HENT: Normal oral muscosa  Cardiovascular: RRR, S1, S2, no murmurs, rubs, or gallops; no edema; no JVD  Respiratory: Clear to auscultation bilaterally  Gastrointestinal: soft, non-tender, non-distended with normal bowel sounds  Musculoskeletal: No clubbing; no joint deformity   Neurologic: Non-focal  Lymphatic: No lymphadenopathy  Psychiatry: AAOx3, mood & affect appropriate  Skin: No rashes, ecchymoses, or cyanosis                          12.1   10.34 )-----------( 349      ( 2019 00:41 )             36.7         131<L>  |  92<L>  |  21  ----------------------------<  123<H>  4.1   |  28  |  0.69    Ca    8.7      2019 00:41  Phos  2.9       Mg     2.1         TPro  6.6  /  Alb  2.8<L>  /  TBili  0.3  /  DBili  x   /  AST  64<H>  /  ALT  66<H>  /  AlkPhos  91      PT/INR - ( 30 Oct 2019 15:34 )   PT: 17.4 sec;   INR: 1.49 ratio         PTT - ( 2019 07:00 )  PTT:82.9 sec  CARDIAC MARKERS ( 31 Oct 2019 06:47 )  x     / x     / x     / x     / 3.0 ng/mL      Serum Pro-Brain Natriuretic Peptide: 1390 pg/mL (10-30 @ 15:34)    Total Cholesterol: 133  LDL: 70  HDL: 43  T    Hemoglobin A1C, Whole Blood: 5.6 % (10-31 @ 08:54)    Interpretation of Telemetry:  Aflutter 110s to SR 80-90s at around 1am, PVCs

## 2019-11-01 NOTE — PHYSICAL THERAPY INITIAL EVALUATION ADULT - PERTINENT HX OF CURRENT PROBLEM, REHAB EVAL
87 y/o Female with pmhx of arthritis coming in with progressively worsening shortness of breath x1 week. 87 y/o Female with pmhx of arthritis coming in with progressively worsening shortness of breath x1 week. Pt s/p TTE 10/31. CXR (10/30): L pleural effusion with atelectasis

## 2019-11-01 NOTE — PROGRESS NOTE ADULT - ATTENDING COMMENTS
Patient is seen and examined with fellow, NP and the CCU house-staff. I agree with the history, physical and the assessment and plan.  plan for IR tap of the pericardial effusion  curently hemodynamically stable with no signs of clinical tamponade  on hep gtt for the aflutter  converted spontaneously to NSR  off of AVN blockers at this time

## 2019-11-02 DIAGNOSIS — I48.92 UNSPECIFIED ATRIAL FLUTTER: ICD-10-CM

## 2019-11-02 LAB
ALBUMIN SERPL ELPH-MCNC: 2.9 G/DL — LOW (ref 3.3–5)
ALP SERPL-CCNC: 89 U/L — SIGNIFICANT CHANGE UP (ref 40–120)
ALT FLD-CCNC: 51 U/L — HIGH (ref 10–45)
ANION GAP SERPL CALC-SCNC: 10 MMOL/L — SIGNIFICANT CHANGE UP (ref 5–17)
APTT BLD: 66 SEC — HIGH (ref 27.5–36.3)
AST SERPL-CCNC: 38 U/L — SIGNIFICANT CHANGE UP (ref 10–40)
BASOPHILS # BLD AUTO: 0.04 K/UL — SIGNIFICANT CHANGE UP (ref 0–0.2)
BASOPHILS NFR BLD AUTO: 0.7 % — SIGNIFICANT CHANGE UP (ref 0–2)
BILIRUB SERPL-MCNC: 0.3 MG/DL — SIGNIFICANT CHANGE UP (ref 0.2–1.2)
BUN SERPL-MCNC: 14 MG/DL — SIGNIFICANT CHANGE UP (ref 7–23)
CALCIUM SERPL-MCNC: 8.9 MG/DL — SIGNIFICANT CHANGE UP (ref 8.4–10.5)
CHLORIDE SERPL-SCNC: 95 MMOL/L — LOW (ref 96–108)
CO2 SERPL-SCNC: 28 MMOL/L — SIGNIFICANT CHANGE UP (ref 22–31)
CREAT SERPL-MCNC: 0.59 MG/DL — SIGNIFICANT CHANGE UP (ref 0.5–1.3)
EOSINOPHIL # BLD AUTO: 0.29 K/UL — SIGNIFICANT CHANGE UP (ref 0–0.5)
EOSINOPHIL NFR BLD AUTO: 5.3 % — SIGNIFICANT CHANGE UP (ref 0–6)
GLUCOSE SERPL-MCNC: 109 MG/DL — HIGH (ref 70–99)
HCT VFR BLD CALC: 39.6 % — SIGNIFICANT CHANGE UP (ref 34.5–45)
HGB BLD-MCNC: 12.3 G/DL — SIGNIFICANT CHANGE UP (ref 11.5–15.5)
IMM GRANULOCYTES NFR BLD AUTO: 0.2 % — SIGNIFICANT CHANGE UP (ref 0–1.5)
LYMPHOCYTES # BLD AUTO: 1.26 K/UL — SIGNIFICANT CHANGE UP (ref 1–3.3)
LYMPHOCYTES # BLD AUTO: 22.9 % — SIGNIFICANT CHANGE UP (ref 13–44)
MAGNESIUM SERPL-MCNC: 2.2 MG/DL — SIGNIFICANT CHANGE UP (ref 1.6–2.6)
MCHC RBC-ENTMCNC: 28.1 PG — SIGNIFICANT CHANGE UP (ref 27–34)
MCHC RBC-ENTMCNC: 31.1 GM/DL — LOW (ref 32–36)
MCV RBC AUTO: 90.4 FL — SIGNIFICANT CHANGE UP (ref 80–100)
MONOCYTES # BLD AUTO: 0.61 K/UL — SIGNIFICANT CHANGE UP (ref 0–0.9)
MONOCYTES NFR BLD AUTO: 11.1 % — SIGNIFICANT CHANGE UP (ref 2–14)
NEUTROPHILS # BLD AUTO: 3.29 K/UL — SIGNIFICANT CHANGE UP (ref 1.8–7.4)
NEUTROPHILS NFR BLD AUTO: 59.8 % — SIGNIFICANT CHANGE UP (ref 43–77)
NRBC # BLD: 0 /100 WBCS — SIGNIFICANT CHANGE UP (ref 0–0)
PHOSPHATE SERPL-MCNC: 3.1 MG/DL — SIGNIFICANT CHANGE UP (ref 2.5–4.5)
PLATELET # BLD AUTO: 324 K/UL — SIGNIFICANT CHANGE UP (ref 150–400)
POTASSIUM SERPL-MCNC: 4.2 MMOL/L — SIGNIFICANT CHANGE UP (ref 3.5–5.3)
POTASSIUM SERPL-SCNC: 4.2 MMOL/L — SIGNIFICANT CHANGE UP (ref 3.5–5.3)
PROT SERPL-MCNC: 6.7 G/DL — SIGNIFICANT CHANGE UP (ref 6–8.3)
RBC # BLD: 4.38 M/UL — SIGNIFICANT CHANGE UP (ref 3.8–5.2)
RBC # FLD: 13.4 % — SIGNIFICANT CHANGE UP (ref 10.3–14.5)
SODIUM SERPL-SCNC: 133 MMOL/L — LOW (ref 135–145)
WBC # BLD: 5.5 K/UL — SIGNIFICANT CHANGE UP (ref 3.8–10.5)
WBC # FLD AUTO: 5.5 K/UL — SIGNIFICANT CHANGE UP (ref 3.8–10.5)

## 2019-11-02 PROCEDURE — 99233 SBSQ HOSP IP/OBS HIGH 50: CPT

## 2019-11-02 PROCEDURE — 71045 X-RAY EXAM CHEST 1 VIEW: CPT | Mod: 26

## 2019-11-02 RX ORDER — METOPROLOL TARTRATE 50 MG
12.5 TABLET ORAL EVERY 8 HOURS
Refills: 0 | Status: DISCONTINUED | OUTPATIENT
Start: 2019-11-02 | End: 2019-11-03

## 2019-11-02 RX ORDER — APIXABAN 2.5 MG/1
5 TABLET, FILM COATED ORAL EVERY 12 HOURS
Refills: 0 | Status: DISCONTINUED | OUTPATIENT
Start: 2019-11-02 | End: 2019-11-03

## 2019-11-02 RX ORDER — FUROSEMIDE 40 MG
20 TABLET ORAL DAILY
Refills: 0 | Status: DISCONTINUED | OUTPATIENT
Start: 2019-11-02 | End: 2019-11-04

## 2019-11-02 RX ADMIN — HEPARIN SODIUM 1000 UNIT(S)/HR: 5000 INJECTION INTRAVENOUS; SUBCUTANEOUS at 07:11

## 2019-11-02 RX ADMIN — Medication 20 MILLIGRAM(S): at 16:46

## 2019-11-02 RX ADMIN — Medication 25 MICROGRAM(S): at 05:39

## 2019-11-02 RX ADMIN — APIXABAN 5 MILLIGRAM(S): 2.5 TABLET, FILM COATED ORAL at 10:51

## 2019-11-02 RX ADMIN — Medication 12.5 MILLIGRAM(S): at 16:46

## 2019-11-02 RX ADMIN — HEPARIN SODIUM 1000 UNIT(S)/HR: 5000 INJECTION INTRAVENOUS; SUBCUTANEOUS at 00:52

## 2019-11-02 NOTE — PROGRESS NOTE ADULT - ATTENDING COMMENTS
I have personally seen, examined and participated in the care of this patient. I have reviewed all pertinent clinical information, including history, physical exam, plan and the nurse practitioner's note. I agree with the nurse practitioner's note with the following additions:    Dyspnea on exertion found to have tamponade by echocardiography without clinical tamponade; no significant effusion for drainage per IR  Also with diastolic dysfunction on echo; decompensated heart failure is the likely etiology of her symptoms  On exam today she is compensated and hemodynamically stable  She complains of dyspnea while in bed yet is able to lie flat and speak in full sentences without distress or desaturation on room air; she appears comfortable despite her subjective symptoms  Her family is requesting a repeat echo and "more tests" and want her to spend the majority of the day in bed  Both tamponade and heart failure are clinical diagnoses and she does not appear to have either on exam  I do not feel that repeat echocardiography is warranted or is of any utility at this time  I believe her symptoms are due in part to anxiety  Start Lopressor 12.5 mg PO BID, Lasix 20 mg PO daily  OOB, ambulate and monitor pulmonary status I have personally seen, examined and participated in the care of this patient. I have reviewed all pertinent clinical information, including history, physical exam, plan and the nurse practitioner's note. I agree with the nurse practitioner's note with the following additions:    Dyspnea on exertion found to have tamponade by echocardiography without clinical tamponade; no significant effusion for drainage per IR  Also with likely diastolic dysfunction on echo; decompensated heart failure is the likely etiology of her symptoms  On exam today she is compensated and hemodynamically stable  She complains of dyspnea while in bed yet is able to lie flat and speak in full sentences without distress or desaturation on room air; she appears comfortable despite her subjective symptoms  Her family is requesting a repeat echo and "more tests" and want her to spend the majority of the day in bed  Both tamponade and heart failure are clinical diagnoses and she does not appear to have either on exam  I do not feel that repeat echocardiography is warranted or is of any utility at this time  I believe her symptoms are due in part to anxiety  Start Lopressor 12.5 mg PO BID, Lasix 20 mg PO daily  OOB, ambulate and monitor pulmonary status

## 2019-11-02 NOTE — PROGRESS NOTE ADULT - SUBJECTIVE AND OBJECTIVE BOX
Patient seen and examined at bedside  pt feeling a little better  son and daughter at bedside per pt's request  Case discussed with medical team    HPI:  Pt is a 87 y/o Female with pmhx of arthritis coming in with progressively worsening shortness of breath x1 week. Patient states last week she started to feel short of breath when she would walk around or minimally exert herself, now for the last few days she has been feeling more short of breath with minimal movements. States that she has had some mild cough associated with this shortness of breath; however, denies any mucous production, fever or chills. Denies any chest pain associated with this. Denies ever having symptoms like this before. Does not use oxygen at home. (30 Oct 2019 17:15)      PAST MEDICAL & SURGICAL HISTORY:  Hypothyroid  No pertinent past medical history  No significant past surgical history      No Known Allergies       MEDICATIONS  (STANDING):  apixaban 5 milliGRAM(s) Oral every 12 hours  levothyroxine 25 MICROGram(s) Oral daily    MEDICATIONS  (PRN):      REVIEW OF SYSTEMS:  CONSTITUTIONAL: (+) malaise.   EYES: No acute change in vision   ENT:  No tinnitus  NECK: No stiffness  RESPIRATORY: No hemoptysis  CARDIOVASCULAR: decreased exercisetolerance. No chest pain, palpitations, syncope  GASTROINTESTINAL: No hematemesis, diarrhea, melena, or hematochezia.  GENITOURINARY: No hematuria  NEUROLOGICAL: No headaches  LYMPH Nodes: No enlarged glands  ENDOCRINE: No heat or cold intolerance	    T(C): 36.8 (11-02-19 @ 04:10), Max: 36.8 (11-02-19 @ 04:10)  HR: 94 (11-02-19 @ 09:00) (78 - 94)  BP: 134/60 (11-02-19 @ 09:00) (106/53 - 166/70)  RR: 32 (11-02-19 @ 09:00) (16 - 32)  SpO2: 96% (11-02-19 @ 09:00) (96% - 100%)    PHYSICAL EXAMINATION:   Constitutional:  NAD  HEENT: NC, AT  Neck:  Supple  Respiratory:  Adequate airflow b/l. Not using accessory muscles of respiration.  Cardiovascular: decreased cardiac sounds. sys murmur stable. S1 & S2 intact, no R/G, 2+ radial pulses b/l  Gastrointestinal: Soft, NT, ND, normoactive b.s., no organomegaly/RT/rigidity  Extremities: WWP  Neurological:  Alert and awake.  No acute focal motor deficits. Crude sensation intact.     Labs and imaging reviewed    LABS:                        12.3   5.50  )-----------( 324      ( 02 Nov 2019 06:03 )             39.6     11-02    133<L>  |  95<L>  |  14  ----------------------------<  109<H>  4.2   |  28  |  0.59    Ca    8.9      02 Nov 2019 06:03  Phos  3.1     11-02  Mg     2.2     11-02    TPro  6.7  /  Alb  2.9<L>  /  TBili  0.3  /  DBili  x   /  AST  38  /  ALT  51<H>  /  AlkPhos  89  11-02        PTT - ( 02 Nov 2019 06:03 )  PTT:66.0 sec    CAPILLARY BLOOD GLUCOSE            LIVER FUNCTIONS - ( 02 Nov 2019 06:03 )  Alb: 2.9 g/dL / Pro: 6.7 g/dL / ALK PHOS: 89 U/L / ALT: 51 U/L / AST: 38 U/L / GGT: x               RADIOLOGY & ADDITIONAL STUDIES:

## 2019-11-02 NOTE — PROGRESS NOTE ADULT - SUBJECTIVE AND OBJECTIVE BOX
Admission date:  CHIEF COMPLAINT:  HPI:  Pt is a 89 y/o Female with pmhx of arthritis coming in with progressively worsening shortness of breath x1 week. Patient states last week she started to feel short of breath when she would walk around or minimally exert herself, now for the last few days she has been feeling more short of breath with minimal movements. States that she has had some mild cough associated with this shortness of breath; however, denies any mucous production, fever or chills. Denies any chest pain associated with this. Denies ever having symptoms like this before. Does not use oxygen at home. (30 Oct 2019 17:15)    INTERVAL HISTORY: Patient seen and examined, denies CP, dyspnea, orthopnea, PND, palpitations and able to lay flat. NAD noted.    REVIEW OF SYSTEMS:    CONSTITUTIONAL: No weakness, fevers or chills  EYES/ENT: No visual changes;  No vertigo or throat pain   NECK: No pain or stiffness  RESPIRATORY: No cough, wheezing, hemoptysis; No shortness of breath  CARDIOVASCULAR: No chest pain or palpitations  GASTROINTESTINAL: No abdominal or epigastric pain. No nausea, vomiting, or hematemesis; No diarrhea or constipation. No melena or hematochezia.  GENITOURINARY: No dysuria, frequency or hematuria  NEUROLOGICAL: No numbness or weakness  SKIN: No itching, rashes      MEDICATIONS  (STANDING):  heparin  Infusion. 1000 Unit(s)/Hr (10 mL/Hr) IV Continuous <Continuous>  levothyroxine 25 MICROGram(s) Oral daily    MEDICATIONS  (PRN):  heparin  Injectable 5000 Unit(s) IV Push every 6 hours PRN For aPTT less than 40  heparin  Injectable 2500 Unit(s) IV Push every 6 hours PRN For aPTT between 40 - 57      Objective:  ICU Vital Signs Last 24 Hrs  T(C): 36.8 (2019 04:10), Max: 36.8 (2019 04:10)  T(F): 98.2 (2019 04:10), Max: 98.2 (2019 04:10)  HR: 85 (2019 06:00) (78 - 90)  BP: 155/69 (2019 06:00) (91/51 - 166/70)  BP(mean): 99 (2019 06:00) (64 - 103)  ABP: --  ABP(mean): --  RR: 23 (2019 06:00) (16 - 31)  SpO2: 99% (2019 06:00) (97% - 100%)           @ 07:01  -   @ 07:00  --------------------------------------------------------  IN: 180 mL / OUT: 650 mL / NET: -470 mL      Daily Weight in k.1 (2019 05:00)    PHYSICAL EXAM:    General: WN/WD NAD  Neurology: Awake, nonfocal, GRESHAM x 4  Eyes: Scleras clear, PERRLA/ EOMI, Gross vision intact  ENT:Gross hearing intact, grossly patent pharynx, no stridor  Neck: Neck supple, trachea midline, No JVD,   Respiratory: CTA B/L, No wheezing, rales, rhonchi  CV: RRR, S1S2, no murmurs, rubs or gallops  Abdominal: Soft, NT, ND +BS,   Extremities: No edema, + peripheral pulses  Skin: No Rashes, Hematoma, Ecchymosis  Lymphatic: No Neck, axilla, groin LAD  Psych: Oriented x 3, normal affect        TELEMETRY: SR 90s     EKG:   < from: 12 Lead ECG (19 @ 07:23) >    Ventricular Rate 77 BPM    Atrial Rate 77 BPM    P-R Interval 162 ms    QRS Duration 68 ms    Q-T Interval 380 ms    QTC Calculation(Bezet) 430 ms    P Axis -1 degrees    R Axis 57 degrees    T Axis 76 degrees    Diagnosis Line SINUS RHYTHM WITH OCCASIONAL PREMATURE VENTRICULAR COMPLEXES  LOW VOLTAGE QRS  SEPTAL INFARCT , AGE UNDETERMINED  ABNORMAL ECG    < end of copied text >    IMAGING:  < from: Transthoracic Echocardiogram (10.31.19 @ 14:48) >  Conclusions:c EF 68%  1. Mitral annular calcification, otherwisenormal mitral  valve. Mild mitral regurgitation.  2. Calcified trileaflet aortic valve with normal opening.  No aortic valve regurgitation seen.  3. Normal left ventricular systolic function. No segmental  wall motion abnormalities.  4. Normal right ventricular size and function.  5. Thickened pericardium. Moderate to large pericardial  effusion. The effusion measures 1.7 cm anterior to the  right ventricle and 2.2cm posterior to the left ventricle.  There is an organized component of the effusion adherent to  the right ventricle measuring 1cm.  There is significant respiratory variation accross the  mitral valve.  There is partial collapse of the right  atrium and right ventricle.   Echocardiographic evidence of pericardial tamponade.  6. Bilateral pleural effusions.  *** No previous Echo exam.  Results communicated to Dr. King.    < end of copied text >    Labs:                          12.3   5.50  )-----------( 324      ( 2019 06:03 )             39.6     11    133<L>  |  95<L>  |  14  ----------------------------<  109<H>  4.2   |  28  |  0.59    Ca    8.9      2019 06:03  Phos  3.1       Mg     2.2         TPro  6.7  /  Alb  2.9<L>  /  TBili  0.3  /  DBili  x   /  AST  38  /  ALT  51<H>  /  AlkPhos  89  11    LIVER FUNCTIONS - ( 2019 06:03 )  Alb: 2.9 g/dL / Pro: 6.7 g/dL / ALK PHOS: 89 U/L / ALT: 51 U/L / AST: 38 U/L / GGT: x           PTT - ( 2019 06:03 )  PTT:66.0 sec    Urinalysis Basic - ( 31 Oct 2019 09:41 )    Color: Yellow / Appearance: Slightly Turbid / S.015 / pH: x  Gluc: x / Ketone: Negative  / Bili: Negative / Urobili: <2 mg/dL   Blood: x / Protein: Trace / Nitrite: Negative   Leuk Esterase: Small / RBC: 5 /HPF / WBC 10 /HPF   Sq Epi: x / Non Sq Epi: 16 /HPF / Bacteria: Negative        HEALTH ISSUES - PROBLEM Dx:  Acute pulmonary edema with congestive heart failure: Acute pulmonary edema with congestive heart failure  Acute diastolic (congestive) heart failure: Acute diastolic (congestive) heart failure  Pleural effusion: Pleural effusion  Hypothyroidism, unspecified type: Hypothyroidism, unspecified type  Atrial flutter, unspecified type: Atrial flutter, unspecified type  Pericardial effusion with cardiac tamponade: Pericardial effusion with cardiac tamponade  Atrial fibrillation: Atrial fibrillation  Prophylactic measure: Prophylactic measure  Arthritis: Arthritis  Hypothyroid: Hypothyroid  Elevated LFTs: Elevated LFTs  Hyponatremia: Hyponatremia  Dyspnea: Dyspnea  Congestive heart disease: Congestive heart disease Admission date:  CHIEF COMPLAINT:  HPI:  Pt is a 87 y/o Female with pmhx of arthritis coming in with progressively worsening shortness of breath x1 week. Patient states last week she started to feel short of breath when she would walk around or minimally exert herself, now for the last few days she has been feeling more short of breath with minimal movements. States that she has had some mild cough associated with this shortness of breath; however, denies any mucous production, fever or chills. Denies any chest pain associated with this. Denies ever having symptoms like this before. Does not use oxygen at home. (30 Oct 2019 17:15)    INTERVAL HISTORY: Patient seen and examined, denies CP, dyspnea, orthopnea, PND, palpitations and able to lay flat. NAD noted.    REVIEW OF SYSTEMS:    CONSTITUTIONAL: No weakness, fevers or chills  EYES/ENT: No visual changes;  No vertigo or throat pain   NECK: No pain or stiffness  RESPIRATORY: No cough, wheezing, hemoptysis; No shortness of breath  CARDIOVASCULAR: No chest pain or palpitations  GASTROINTESTINAL: No abdominal or epigastric pain. No nausea, vomiting, or hematemesis; No diarrhea or constipation. No melena or hematochezia.  GENITOURINARY: No dysuria, frequency or hematuria  NEUROLOGICAL: No numbness or weakness  SKIN: No itching, rashes      MEDICATIONS  (STANDING):  heparin  Infusion. 1000 Unit(s)/Hr (10 mL/Hr) IV Continuous <Continuous>  levothyroxine 25 MICROGram(s) Oral daily    MEDICATIONS  (PRN):  heparin  Injectable 5000 Unit(s) IV Push every 6 hours PRN For aPTT less than 40  heparin  Injectable 2500 Unit(s) IV Push every 6 hours PRN For aPTT between 40 - 57      Objective:  ICU Vital Signs Last 24 Hrs  T(C): 36.8 (2019 04:10), Max: 36.8 (2019 04:10)  T(F): 98.2 (2019 04:10), Max: 98.2 (2019 04:10)  HR: 85 (2019 06:00) (78 - 90)  BP: 155/69 (2019 06:00) (91/51 - 166/70)  BP(mean): 99 (2019 06:00) (64 - 103)  ABP: --  ABP(mean): --  RR: 23 (2019 06:00) (16 - 31)  SpO2: 99% (2019 06:00) (97% - 100%)           @ 07:01  -   @ 07:00  --------------------------------------------------------  IN: 180 mL / OUT: 650 mL / NET: -470 mL      Daily Weight in k.1 (2019 05:00)    PHYSICAL EXAM:    General: WN/WD NAD  Neurology: Awake, nonfocal, GRESHAM x 4  Eyes: Scleras clear, PERRLA/ EOMI, Gross vision intact  ENT:Gross hearing intact, grossly patent pharynx, no stridor  Neck: Neck supple, trachea midline, No JVD,   Respiratory: CTA B/L, No wheezing, rales, rhonchi  CV: RRR, S1S2, no murmurs, rubs or gallops  Abdominal: Soft, NT, ND +BS,   Extremities: No edema, + peripheral pulses  Skin: No Rashes, Hematoma, Ecchymosis  Lymphatic: No Neck, axilla, groin LAD  Psych: Oriented x 3, normal affect      TELEMETRY: SR 90s     EKG:   < from: 12 Lead ECG (19 @ 07:23) >    Ventricular Rate 77 BPM    Atrial Rate 77 BPM    P-R Interval 162 ms    QRS Duration 68 ms    Q-T Interval 380 ms    QTC Calculation(Bezet) 430 ms    P Axis -1 degrees    R Axis 57 degrees    T Axis 76 degrees    Diagnosis Line SINUS RHYTHM WITH OCCASIONAL PREMATURE VENTRICULAR COMPLEXES  LOW VOLTAGE QRS  SEPTAL INFARCT , AGE UNDETERMINED  ABNORMAL ECG    < end of copied text >    IMAGING:  < from: Transthoracic Echocardiogram (10.31.19 @ 14:48) >  Conclusions:c EF 68%  1. Mitral annular calcification, otherwisenormal mitral  valve. Mild mitral regurgitation.  2. Calcified trileaflet aortic valve with normal opening.  No aortic valve regurgitation seen.  3. Normal left ventricular systolic function. No segmental  wall motion abnormalities.  4. Normal right ventricular size and function.  5. Thickened pericardium. Moderate to large pericardial  effusion. The effusion measures 1.7 cm anterior to the  right ventricle and 2.2cm posterior to the left ventricle.  There is an organized component of the effusion adherent to  the right ventricle measuring 1cm.  There is significant respiratory variation accross the  mitral valve.  There is partial collapse of the right  atrium and right ventricle.   Echocardiographic evidence of pericardial tamponade.  6. Bilateral pleural effusions.  *** No previous Echo exam.  Results communicated to Dr. King.    < end of copied text >    Labs:                          12.3   5.50  )-----------( 324      ( 2019 06:03 )             39.6     11    133<L>  |  95<L>  |  14  ----------------------------<  109<H>  4.2   |  28  |  0.59    Ca    8.9      2019 06:03  Phos  3.1       Mg     2.2         TPro  6.7  /  Alb  2.9<L>  /  TBili  0.3  /  DBili  x   /  AST  38  /  ALT  51<H>  /  AlkPhos  89  11    LIVER FUNCTIONS - ( 2019 06:03 )  Alb: 2.9 g/dL / Pro: 6.7 g/dL / ALK PHOS: 89 U/L / ALT: 51 U/L / AST: 38 U/L / GGT: x           PTT - ( 2019 06:03 )  PTT:66.0 sec    Urinalysis Basic - ( 31 Oct 2019 09:41 )    Color: Yellow / Appearance: Slightly Turbid / S.015 / pH: x  Gluc: x / Ketone: Negative  / Bili: Negative / Urobili: <2 mg/dL   Blood: x / Protein: Trace / Nitrite: Negative   Leuk Esterase: Small / RBC: 5 /HPF / WBC 10 /HPF   Sq Epi: x / Non Sq Epi: 16 /HPF / Bacteria: Negative        HEALTH ISSUES - PROBLEM Dx:  Acute pulmonary edema with congestive heart failure: Acute pulmonary edema with congestive heart failure  Acute diastolic (congestive) heart failure: Acute diastolic (congestive) heart failure  Pleural effusion: Pleural effusion  Hypothyroidism, unspecified type: Hypothyroidism, unspecified type  Atrial flutter, unspecified type: Atrial flutter, unspecified type  Pericardial effusion with cardiac tamponade: Pericardial effusion with cardiac tamponade  Atrial fibrillation: Atrial fibrillation  Prophylactic measure: Prophylactic measure  Arthritis: Arthritis  Hypothyroid: Hypothyroid  Elevated LFTs: Elevated LFTs  Hyponatremia: Hyponatremia  Dyspnea: Dyspnea  Congestive heart disease: Congestive heart disease

## 2019-11-02 NOTE — PROGRESS NOTE ADULT - ASSESSMENT
87 yo F w/ PMH arthritis who came in for evaluation of SOB X1 week. Found to have new onset heart failure and Afib. Moved to CCU2 due to concern for early tamponade on TTE 10/31/19. Pending IR drainage of pericardial effusion today. Nephrology is consulted for hyponatremia    Hyponatremia  - currently appears hypovolemic   - serum sodium improved today    - repeat BMP, Priscila, UCl, Uosm, Uurea, and UA simultaneously today    - encourage PO solute intake when NPO status is lifted  - 1L fluid restricted diet  - low salt diet    - Na should not change by more than 8 mEq in 24 hrs

## 2019-11-02 NOTE — PROGRESS NOTE ADULT - ASSESSMENT
87 y/o F with PMH of hypothyroid, uterine CA s/p hysterectomy (1987), s/p hernia repair, macular degeneration, history of syncopal episodes (5-6 years ago) and resolved HTN presents with 3 week history of progressive dyspnea. Family states she started with viral URI symptoms 3 weeks ago with cough, congestion, rhinorrhea which progressed to wheezing and eventually was unable to speak in full sentences d/t dyspnea. She was found to have moderate-large pericardial effusion with evidence of tamponade physiology with L>R effusions. Currently on 2L NC, 99% and breathing comfortably. Per family at bedside she does not like to go to doctors, has never had a colonoscopy, last mammography was several years ago. No history of rheumatologic illness in family.

## 2019-11-02 NOTE — PROGRESS NOTE ADULT - SUBJECTIVE AND OBJECTIVE BOX
[facility]   MD STAN MERAZ D.O, PA    TELEPHONE NUMBERS:  OFFICE: [phone]  DR. AYALA CELL: [phone]  TONO ROJAS CELL: [phone]  DR. JOLLY CELL:  [phone]    RENAL FOLLOW UP NOTE  --------------------------------------------------------------------------------  HPI: Pt seen and examined at bedside. Pt denies drinking excessive water. Eating well. No diarrhea   Denies SOB, chest pain     PAST HISTORY  --------------------------------------------------------------------------------  No significant changes to PMH, PSH, FHx, SHx, unless otherwise noted    ALLERGIES & MEDICATIONS  --------------------------------------------------------------------------------  Allergies    No Known Allergies    Intolerances      Standing Inpatient Medications  heparin  Infusion. 1000 Unit(s)/Hr IV Continuous <Continuous>  levothyroxine 25 MICROGram(s) Oral daily    PRN Inpatient Medications  heparin  Injectable 5000 Unit(s) IV Push every 6 hours PRN  heparin  Injectable 2500 Unit(s) IV Push every 6 hours PRN      REVIEW OF SYSTEMS  --------------------------------------------------------------------------------  General: no fever  CVS: no chest pain  RESP: no sob, no cough  ABD: no abdominal pain  : no dysuria  MSK: no edema     VITALS/PHYSICAL EXAM  --------------------------------------------------------------------------------  T(C): 36.8 (11-02-19 @ 04:10), Max: 36.8 (11-02-19 @ 04:10)  HR: 81 (11-02-19 @ 07:00) (78 - 90)  BP: 151/67 (11-02-19 @ 07:00) (106/53 - 166/70)  RR: 17 (11-02-19 @ 07:00) (16 - 27)  SpO2: 98% (11-02-19 @ 07:00) (97% - 100%)  Wt(kg): --        11-01-19 @ 07:01  -  11-02-19 @ 07:00  --------------------------------------------------------  IN: 180 mL / OUT: 650 mL / NET: -470 mL      Physical Exam:  	Gen: NAD  	HEENT: MMM  	Pulm: CTA B/L  	CV: S1S2  	Abd: Soft, +BS  	Ext: No LE edema B/L                      Neuro: Awake   	Skin: Warm and Dry       LABS/STUDIES  --------------------------------------------------------------------------------              12.3   5.50  >-----------<  324      [11-02-19 @ 06:03]              39.6     133  |  95  |  14  ----------------------------<  109      [11-02-19 @ 06:03]  4.2   |  28  |  0.59        Ca     8.9     [11-02-19 @ 06:03]      Mg     2.2     [11-02-19 @ 06:03]      Phos  3.1     [11-02-19 @ 06:03]    TPro  6.7  /  Alb  2.9  /  TBili  0.3  /  DBili  x   /  AST  38  /  ALT  51  /  AlkPhos  89  [11-02-19 @ 06:03]    PTT: 66.0       [11-02-19 @ 06:03]    Creatinine Trend:  SCr 0.59 [11-02 @ 06:03]  SCr 0.69 [11-01 @ 00:41]  SCr 0.71 [10-31 @ 20:23]  SCr 0.58 [10-31 @ 06:47]  SCr 0.61 [10-30 @ 15:34]    Urinalysis - [10-31-19 @ 09:41]      Color Yellow / Appearance Slightly Turbid / SG 1.015 / pH 6.0      Gluc Negative / Ketone Negative  / Bili Negative / Urobili <2 mg/dL       Blood Moderate / Protein Trace / Leuk Est Small / Nitrite Negative      RBC 5 / WBC 10 / Hyaline 11 / Gran 2 / Sq Epi  / Non Sq Epi 16 / Bacteria Negative    Urine Sodium 42      [10-31-19 @ 06:49]  Urine Chloride 66      [10-31-19 @ 06:49]  Urine Osmolality 466      [10-31-19 @ 09:41]    HbA1c 5.6      [10-31-19 @ 08:54]  TSH 2.85      [10-31-19 @ 08:38]  Lipid: chol 133, TG 98, HDL 43, LDL 70      [10-31-19 @ 08:34]

## 2019-11-02 NOTE — PROGRESS NOTE ADULT - ASSESSMENT
88F with arthritis and hypothyroidism presented with STOCK, found to be in Aflutter with RVR. Transferred to CCU for monitoring after echo evidence with moderate pericardial effusion with early signs of tamponade.       Pericardial effusion with early signs of tamponade on TTE. Hemodynamically stable.  - 10/30 CT chest: Small pericardial and bilateral pleural effusions.  - Plan for drainage with IR today     Atrial flutter, now in SR. CHADS-VASc at least 3.   - Cont on heparin drip, can switch to NOAC after pericardiocentesis     Hyponatremia, improving. DDx hypervolemic vs SIADH. Renal following. Currently euvolemic on exam.  - Cont to monitor    Hypothyroidism.  - Continue levothyroxine 88F with arthritis and hypothyroidism presented with STOCK, found to be in Aflutter with RVR. Transferred to CCU for monitoring after echo evidence with moderate pericardial effusion with early signs of tamponade.       Pericardial effusion with early signs of tamponade on TTE. Hemodynamically stable.  - 10/30 CT chest: Small pericardial and bilateral pleural effusions.  - 10/31 Pericardial drainage aborted as there has been significant decrease in size of pericardial effusion and patients current hemodynamic instability      Atrial flutter, now in SR. CHADS-VASc at least 3.   - Cont on heparin drip, can switch to NOAC after pericardiocentesis     Hyponatremia, improving. DDx hypervolemic vs SIADH. Renal following. Currently euvolemic on exam.  - Cont to monitor    Hypothyroidism.  - Continue levothyroxine

## 2019-11-02 NOTE — PROGRESS NOTE ADULT - PROBLEM SELECTOR PLAN 1
-IR drainage of pericardial effusion aborted yesterday, as effusion now much smaller  -Some improvement in dyspnea

## 2019-11-02 NOTE — PROGRESS NOTE ADULT - PROBLEM SELECTOR PLAN 2
L>R pleural effusion likely pleuro-pericardial effusion with atelectasis  -Keep O>I as tolerated  -Sats 96-97% on RA, keep sp02>90% on supplemental oxygen as needed.

## 2019-11-02 NOTE — PROGRESS NOTE ADULT - SUBJECTIVE AND OBJECTIVE BOX
Follow-up Pulm Progress Note    Some improvement in SOB  Sats 99% on 2LNC / sats 96-97% RA    Medications:  MEDICATIONS  (STANDING):  apixaban 5 milliGRAM(s) Oral every 12 hours  levothyroxine 25 MICROGram(s) Oral daily        Vital Signs Last 24 Hrs  T(C): 36.8 (02 Nov 2019 04:10), Max: 36.8 (02 Nov 2019 04:10)  T(F): 98.2 (02 Nov 2019 04:10), Max: 98.2 (02 Nov 2019 04:10)  HR: 94 (02 Nov 2019 09:00) (78 - 94)  BP: 134/60 (02 Nov 2019 09:00) (106/53 - 166/70)  BP(mean): 86 (02 Nov 2019 09:00) (77 - 103)  RR: 32 (02 Nov 2019 09:00) (16 - 32)  SpO2: 96% (02 Nov 2019 09:00) (96% - 100%)          11-01 @ 07:01  -  11-02 @ 07:00  --------------------------------------------------------  IN: 180 mL / OUT: 650 mL / NET: -470 mL          LABS:                        12.3   5.50  )-----------( 324      ( 02 Nov 2019 06:03 )             39.6     11-02    133<L>  |  95<L>  |  14  ----------------------------<  109<H>  4.2   |  28  |  0.59    Ca    8.9      02 Nov 2019 06:03  Phos  3.1     11-02  Mg     2.2     11-02    TPro  6.7  /  Alb  2.9<L>  /  TBili  0.3  /  DBili  x   /  AST  38  /  ALT  51<H>  /  AlkPhos  89  11-02        PTT - ( 02 Nov 2019 06:03 )  PTT:66.0 sec      Serum Pro-Brain Natriuretic Peptide: 1390 pg/mL (10-30-19 @ 15:34)          Physical Examination:  PULM: Clear to auscultation bilaterally, no significant sputum production  CVS: S1, S2 heard    RADIOLOGY REVIEWED  CT chest:   FINDINGS:     Central airways are patent. Right lower lobe calcified granuloma.   Otherwise, the lungs are clear. Small bilateral pleural effusions.    The heart size is normal. Small pericardial effusion. Coronary artery and   aortic calcifications.    Imaged portions of the upper abdomen is unremarkable.    The bones are unremarkable.    IMPRESSION:     Small pericardial and bilateral pleural effusions.

## 2019-11-03 DIAGNOSIS — I48.91 UNSPECIFIED ATRIAL FIBRILLATION: ICD-10-CM

## 2019-11-03 DIAGNOSIS — I50.9 HEART FAILURE, UNSPECIFIED: ICD-10-CM

## 2019-11-03 LAB
ALBUMIN SERPL ELPH-MCNC: 2.8 G/DL — LOW (ref 3.3–5)
ALP SERPL-CCNC: 82 U/L — SIGNIFICANT CHANGE UP (ref 40–120)
ALT FLD-CCNC: 41 U/L — SIGNIFICANT CHANGE UP (ref 10–45)
ANION GAP SERPL CALC-SCNC: 10 MMOL/L — SIGNIFICANT CHANGE UP (ref 5–17)
AST SERPL-CCNC: 31 U/L — SIGNIFICANT CHANGE UP (ref 10–40)
BASOPHILS # BLD AUTO: 0.03 K/UL — SIGNIFICANT CHANGE UP (ref 0–0.2)
BASOPHILS NFR BLD AUTO: 0.5 % — SIGNIFICANT CHANGE UP (ref 0–2)
BILIRUB SERPL-MCNC: 0.3 MG/DL — SIGNIFICANT CHANGE UP (ref 0.2–1.2)
BUN SERPL-MCNC: 12 MG/DL — SIGNIFICANT CHANGE UP (ref 7–23)
CALCIUM SERPL-MCNC: 8.7 MG/DL — SIGNIFICANT CHANGE UP (ref 8.4–10.5)
CHLORIDE SERPL-SCNC: 97 MMOL/L — SIGNIFICANT CHANGE UP (ref 96–108)
CO2 SERPL-SCNC: 31 MMOL/L — SIGNIFICANT CHANGE UP (ref 22–31)
CREAT SERPL-MCNC: 0.66 MG/DL — SIGNIFICANT CHANGE UP (ref 0.5–1.3)
EOSINOPHIL # BLD AUTO: 0.24 K/UL — SIGNIFICANT CHANGE UP (ref 0–0.5)
EOSINOPHIL NFR BLD AUTO: 4.3 % — SIGNIFICANT CHANGE UP (ref 0–6)
GLUCOSE SERPL-MCNC: 112 MG/DL — HIGH (ref 70–99)
HCT VFR BLD CALC: 37.2 % — SIGNIFICANT CHANGE UP (ref 34.5–45)
HGB BLD-MCNC: 12 G/DL — SIGNIFICANT CHANGE UP (ref 11.5–15.5)
IMM GRANULOCYTES NFR BLD AUTO: 0.2 % — SIGNIFICANT CHANGE UP (ref 0–1.5)
LYMPHOCYTES # BLD AUTO: 1.59 K/UL — SIGNIFICANT CHANGE UP (ref 1–3.3)
LYMPHOCYTES # BLD AUTO: 28.4 % — SIGNIFICANT CHANGE UP (ref 13–44)
MAGNESIUM SERPL-MCNC: 2.2 MG/DL — SIGNIFICANT CHANGE UP (ref 1.6–2.6)
MCHC RBC-ENTMCNC: 28.7 PG — SIGNIFICANT CHANGE UP (ref 27–34)
MCHC RBC-ENTMCNC: 32.3 GM/DL — SIGNIFICANT CHANGE UP (ref 32–36)
MCV RBC AUTO: 89 FL — SIGNIFICANT CHANGE UP (ref 80–100)
MONOCYTES # BLD AUTO: 0.66 K/UL — SIGNIFICANT CHANGE UP (ref 0–0.9)
MONOCYTES NFR BLD AUTO: 11.8 % — SIGNIFICANT CHANGE UP (ref 2–14)
NEUTROPHILS # BLD AUTO: 3.06 K/UL — SIGNIFICANT CHANGE UP (ref 1.8–7.4)
NEUTROPHILS NFR BLD AUTO: 54.8 % — SIGNIFICANT CHANGE UP (ref 43–77)
NRBC # BLD: 0 /100 WBCS — SIGNIFICANT CHANGE UP (ref 0–0)
PHOSPHATE SERPL-MCNC: 2.8 MG/DL — SIGNIFICANT CHANGE UP (ref 2.5–4.5)
PLATELET # BLD AUTO: 352 K/UL — SIGNIFICANT CHANGE UP (ref 150–400)
POTASSIUM SERPL-MCNC: 4.1 MMOL/L — SIGNIFICANT CHANGE UP (ref 3.5–5.3)
POTASSIUM SERPL-SCNC: 4.1 MMOL/L — SIGNIFICANT CHANGE UP (ref 3.5–5.3)
PROT SERPL-MCNC: 6.3 G/DL — SIGNIFICANT CHANGE UP (ref 6–8.3)
RBC # BLD: 4.18 M/UL — SIGNIFICANT CHANGE UP (ref 3.8–5.2)
RBC # FLD: 13.4 % — SIGNIFICANT CHANGE UP (ref 10.3–14.5)
SODIUM SERPL-SCNC: 138 MMOL/L — SIGNIFICANT CHANGE UP (ref 135–145)
WBC # BLD: 5.59 K/UL — SIGNIFICANT CHANGE UP (ref 3.8–10.5)
WBC # FLD AUTO: 5.59 K/UL — SIGNIFICANT CHANGE UP (ref 3.8–10.5)

## 2019-11-03 PROCEDURE — 99233 SBSQ HOSP IP/OBS HIGH 50: CPT

## 2019-11-03 PROCEDURE — 71045 X-RAY EXAM CHEST 1 VIEW: CPT | Mod: 26

## 2019-11-03 PROCEDURE — 93010 ELECTROCARDIOGRAM REPORT: CPT

## 2019-11-03 RX ORDER — HEPARIN SODIUM 5000 [USP'U]/ML
5000 INJECTION INTRAVENOUS; SUBCUTANEOUS EVERY 8 HOURS
Refills: 0 | Status: DISCONTINUED | OUTPATIENT
Start: 2019-11-03 | End: 2019-11-04

## 2019-11-03 RX ORDER — METOPROLOL TARTRATE 50 MG
25 TABLET ORAL
Refills: 0 | Status: DISCONTINUED | OUTPATIENT
Start: 2019-11-03 | End: 2019-11-04

## 2019-11-03 RX ADMIN — APIXABAN 5 MILLIGRAM(S): 2.5 TABLET, FILM COATED ORAL at 05:39

## 2019-11-03 RX ADMIN — Medication 25 MILLIGRAM(S): at 16:36

## 2019-11-03 RX ADMIN — Medication 25 MILLIGRAM(S): at 05:43

## 2019-11-03 RX ADMIN — Medication 20 MILLIGRAM(S): at 05:40

## 2019-11-03 RX ADMIN — Medication 25 MICROGRAM(S): at 05:40

## 2019-11-03 NOTE — PROGRESS NOTE ADULT - PROBLEM SELECTOR PLAN 1
- mod-large pericardial effusion with evidence of tamponade on echo (10/31)  - planned for IR pericardiocentesis, effusion resolving and too small for intervention - mod-large pericardial effusion with evidence of tamponade on echo (10/31)  - planned for IR pericardiocentesis, effusion resolving and too small for intervention  - Vitals remain stable, no evidence of tamponade.

## 2019-11-03 NOTE — PROGRESS NOTE ADULT - ATTENDING COMMENTS
I have personally seen, examined and participated in the care of this patient. I have reviewed all pertinent clinical information, including history, physical exam, plan and the nurse practitioner's note. I agree with the nurse practitioner's note with the following additions:    Dyspnea on exertion found to have tamponade by echocardiography without clinical tamponade; no significant effusion for drainage per IR (she would likely need a window based on her echo and the amount of fluid that is posterior)  Also with likely diastolic dysfunction on echo; decompensated heart failure is the likely etiology of her symptoms  On exam she is compensated and hemodynamically stable  She complains of dyspnea while in bed yet is able to lie flat and speak in full sentences without distress or desaturation on room air; the same occurs when she walks despite no desaturation or tachycardia; she appears comfortable despite her subjective symptoms  Both tamponade and heart failure are clinical diagnoses and she does not appear to have either on exam  I do not feel that repeat echocardiography is warranted or is of any utility at this time  I believe her symptoms are due in part to anxiety at this time  Tolerating Lopressor 12.5 mg PO BID, Lasix 20 mg PO daily - net negative with stable renal function  OOB, ambulate I have personally seen, examined and participated in the care of this patient. I have reviewed all pertinent clinical information, including history, physical exam, plan and the nurse practitioner's note. I agree with the nurse practitioner's note with the following additions:    Dyspnea on exertion found to have tamponade by echocardiography without clinical tamponade; no significant effusion for drainage per IR (she would likely need a window based on her echo and the amount of fluid that is posterior)  Also with likely diastolic dysfunction on echo; decompensated heart failure is the likely etiology of her symptoms  On exam she is compensated and hemodynamically stable  She complains of dyspnea while in bed yet is able to lie flat and speak in full sentences without distress or desaturation on room air; the same occurs when she walks despite no desaturation or tachycardia; she appears comfortable despite her subjective symptoms  Both tamponade and heart failure are clinical diagnoses and she does not appear to have either on exam  I do not feel that repeat echocardiography is warranted or is of any utility at this time  I believe her symptoms are due in part to anxiety at this time  Tolerating Lopressor 12.5 mg PO BID, Lasix 20 mg PO daily - net negative with stable renal function  One episode of afib during admission - would defer anticoagulation as she only had one episode and is unsteady on her feet - PT consult pending  OOB, ambulate

## 2019-11-03 NOTE — PROGRESS NOTE ADULT - PROBLEM SELECTOR PLAN 1
improving overall  -f/u cxr  interventional procedure previously aborted 2/2 improving s/s  tele, monitor vitals and clinical status closely

## 2019-11-03 NOTE — PROGRESS NOTE ADULT - PROBLEM SELECTOR PLAN 1
- mod-large pericardial effusion with evidence of tamponade on echo (10/31)  - was planned for IR pericardiocentesis, however effusion resolving and too small for intervention  - VSS, no evidence of tamponade.

## 2019-11-03 NOTE — PROGRESS NOTE ADULT - PROBLEM SELECTOR PLAN 2
- b/l pleural effusions, resolved on chest xray  - SpO2 93-98% on room air, SOB improved  - Lungs clear on exam, will d/c daily Lasix.  - Patient ambulating with assistance - b/l pleural effusions, resolved on chest xray  - SpO2 93-98% on room air, SOB improved  - Lungs clear on exam, will d/c daily Lasix.  - Patient ambulating with assistance  - clear for discharge home today, lives with . - b/l pleural effusions, resolved on chest xray  - SpO2 93-98% on room air, SOB improved, lungs clear on exam.  - Patient ambulating with assistance  - clear for discharge home today, lives with . - b/l pleural effusions, resolved on chest xray  - SpO2 93-98% on room air, SOB improved, lungs clear on exam.  - Patient ambulating with assistance, prior to admission ambulated on own. PT consulted.  - discharge to floor - b/l pleural effusions, resolved on chest xray  - SpO2 93-98% on room air, SOB improved, lungs clear on exam.  - Patient ambulating with assistance, prior to admission ambulated on own. PT consulted.  - cont to monitor

## 2019-11-03 NOTE — PROGRESS NOTE ADULT - ASSESSMENT
87 y/o Female with PMHx of arthritis, recent diagnosis of hypothyroidism, presenting with pericardial effusion with tamponade physiology. 87 y/o Female with PMHx of arthritis, recent diagnosis of hypothyroidism, presenting with pericardial effusion with evidence of tamponade.

## 2019-11-03 NOTE — PROGRESS NOTE ADULT - SUBJECTIVE AND OBJECTIVE BOX
[facility] [place] [facility]   MD STAN MERAZ D.O, PA    TELEPHONE NUMBERS:  OFFICE: [phone]  DR. AYALA CELL: [phone]  TONO ROJAS CELL: [phone]  DR. JOLLY CELL:  [phone]    RENAL FOLLOW UP NOTE  --------------------------------------------------------------------------------  HPI: Pt seen and examined at bedside.   Yaima VEGA, chest pain     PAST HISTORY  --------------------------------------------------------------------------------  No significant changes to PMH, PSH, FHx, SHx, unless otherwise noted    ALLERGIES & MEDICATIONS  --------------------------------------------------------------------------------  Allergies    No Known Allergies    Intolerances      Standing Inpatient Medications  apixaban 5 milliGRAM(s) Oral every 12 hours  furosemide    Tablet 20 milliGRAM(s) Oral daily  levothyroxine 25 MICROGram(s) Oral daily  metoprolol tartrate 25 milliGRAM(s) Oral two times a day    PRN Inpatient Medications      REVIEW OF SYSTEMS  --------------------------------------------------------------------------------  General: no fever  CVS: no chest pain  RESP: no sob, no cough  ABD: no abdominal pain  : no dysuria  MSK: no edema     VITALS/PHYSICAL EXAM  --------------------------------------------------------------------------------  T(C): 36.5 (11-03-19 @ 08:00), Max: 36.8 (11-02-19 @ 19:00)  HR: 84 (11-03-19 @ 09:00) (79 - 104)  BP: 130/60 (11-03-19 @ 09:00) (109/56 - 178/93)  RR: 29 (11-03-19 @ 09:00) (19 - 38)  SpO2: 95% (11-03-19 @ 09:00) (92% - 99%)  Wt(kg): --        11-02-19 @ 08:01  -  11-03-19 @ 07:00  --------------------------------------------------------  IN: 230 mL / OUT: 1100 mL / NET: -870 mL      Physical Exam:  	Gen: NAD  	HEENT: MMM  	Pulm: CTA B/L  	CV: S1S2  	Abd: Soft, +BS  	Ext: No LE edema B/L                      Neuro: Awake   	Skin: Warm and Dry       LABS/STUDIES  --------------------------------------------------------------------------------              12.0   5.59  >-----------<  352      [11-03-19 @ 05:19]              37.2     138  |  97  |  12  ----------------------------<  112      [11-03-19 @ 05:19]  4.1   |  31  |  0.66        Ca     8.7     [11-03-19 @ 05:19]      Mg     2.2     [11-03-19 @ 05:19]      Phos  2.8     [11-03-19 @ 05:19]    TPro  6.3  /  Alb  2.8  /  TBili  0.3  /  DBili  x   /  AST  31  /  ALT  41  /  AlkPhos  82  [11-03-19 @ 05:19]      PTT: 66.0       [11-02-19 @ 06:03]      Creatinine Trend:  SCr 0.66 [11-03 @ 05:19]  SCr 0.59 [11-02 @ 06:03]  SCr 0.69 [11-01 @ 00:41]  SCr 0.71 [10-31 @ 20:23]  SCr 0.58 [10-31 @ 06:47]    Urinalysis - [10-31-19 @ 09:41]      Color Yellow / Appearance Slightly Turbid / SG 1.015 / pH 6.0      Gluc Negative / Ketone Negative  / Bili Negative / Urobili <2 mg/dL       Blood Moderate / Protein Trace / Leuk Est Small / Nitrite Negative      RBC 5 / WBC 10 / Hyaline 11 / Gran 2 / Sq Epi  / Non Sq Epi 16 / Bacteria Negative    Urine Sodium 42      [10-31-19 @ 06:49]  Urine Chloride 66      [10-31-19 @ 06:49]  Urine Osmolality 466      [10-31-19 @ 09:41]    HbA1c 5.6      [10-31-19 @ 08:54]  TSH 2.85      [10-31-19 @ 08:38]  Lipid: chol 133, TG 98, HDL 43, LDL 70      [10-31-19 @ 08:34]

## 2019-11-03 NOTE — CHART NOTE - NSCHARTNOTEFT_GEN_A_CORE
Patient seen and examined at bedside.    Overnight Events: No acute events overnight. Pt doing well. No SOB. No chest pain.      REVIEW OF SYSTEMS:  Constitutional:     [ ] negative [ ] fevers [ ] chills [ ] weight loss [ ] weight gain  HEENT:                  [ ] negative [ ] dry eyes [ ] eye irritation [ ] postnasal drip [ ] nasal congestion  CV:                         [ ] negative  [ ] chest pain [ ] orthopnea [ ] palpitations [ ] murmur  Resp:                     [ ] negative [ ] cough [ ] shortness of breath [ ] dyspnea [ ] wheezing [ ] sputum [ ]hemoptysis  GI:                          [ ] negative [ ] nausea [ ] vomiting [ ] diarrhea [ ] constipation [ ] abd pain [ ] dysphagia   :                        [ ] negative [ ] dysuria [ ] nocturia [ ] hematuria [ ] increased urinary frequency  Musculoskeletal: [ ] negative [ ] back pain [ ] myalgias [ ] arthralgias [ ] fracture  Skin:                       [ ] negative [ ] rash [ ] itch  Neurological:        [ ] negative [ ] headache [ ] dizziness [ ] syncope [ ] weakness [ ] numbness  Psychiatric:           [ ] negative [ ] anxiety [ ] depression  Endocrine:            [ ] negative [ ] diabetes [ ] thyroid problem  Heme/Lymph:      [ ] negative [ ] anemia [ ] bleeding problem  Allergic/Immune: [ ] negative [ ] itchy eyes [ ] nasal discharge [ ] hives [ ] angioedema    [x ] All other systems negative  [ ] Unable to assess ROS due to    Current Meds:  apixaban 5 milliGRAM(s) Oral every 12 hours  furosemide    Tablet 20 milliGRAM(s) Oral daily  levothyroxine 25 MICROGram(s) Oral daily  metoprolol tartrate 12.5 milliGRAM(s) Oral every 8 hours      PAST MEDICAL & SURGICAL HISTORY:  Hypothyroid  No pertinent past medical history  No significant past surgical history      Vitals:  T(F): 98.2 (11-02), Max: 98.2 (11-02)  HR: 87 (11-02) (80 - 104)  BP: 143/60 (11-02) (109/56 - 166/70)  RR: 31 (11-02)  SpO2: 94% (11-02)  I&O's Summary    01 Nov 2019 07:01  -  02 Nov 2019 07:00  --------------------------------------------------------  IN: 180 mL / OUT: 650 mL / NET: -470 mL    02 Nov 2019 08:01  -  03 Nov 2019 00:03  --------------------------------------------------------  IN: 230 mL / OUT: 850 mL / NET: -620 mL        Physical Exam:  Appearance: No acute distress; well appearing  Eyes: PERRL, EOMI, pink conjunctiva  HENT: Normal oral mucosa  Cardiovascular: RRR, S1, S2, no murmurs, rubs, or gallops; no edema; no JVD  Respiratory: Clear to auscultation bilaterally  Gastrointestinal: soft, non-tender, non-distended with normal bowel sounds  Musculoskeletal: No clubbing; no joint deformity   Neurologic: Non-focal  Lymphatic: No lymphadenopathy  Psychiatry: AAOx3, mood & affect appropriate  Skin: No rashes, ecchymoses, or cyanosis                          12.3   5.50  )-----------( 324      ( 02 Nov 2019 06:03 )             39.6     11-02    133<L>  |  95<L>  |  14  ----------------------------<  109<H>  4.2   |  28  |  0.59    Ca    8.9      02 Nov 2019 06:03  Phos  3.1     11-02  Mg     2.2     11-02    TPro  6.7  /  Alb  2.9<L>  /  TBili  0.3  /  DBili  x   /  AST  38  /  ALT  51<H>  /  AlkPhos  89  11-02    PTT - ( 02 Nov 2019 06:03 )  PTT:66.0 sec      Serum Pro-Brain Natriuretic Peptide: 1390 pg/mL (10-30 @ 15:34)          New ECG(s): Personally reviewed    Echo:  < from: Transthoracic Echocardiogram (10.31.19 @ 14:48) >    1. Mitral annular calcification, otherwise normal mitral  valve. Mild mitral regurgitation.  2. Calcified trileaflet aortic valve with normal opening.  No aortic valve regurgitation seen.  3. Normal left ventricular systolic function. No segmental  wall motion abnormalities.  4. Normal right ventricular size and function.  5. Thickened pericardium. Moderate to large pericardial  effusion. The effusion measures 1.7 cm anterior to the  right ventricle and 2.2cm posterior to the left ventricle.  There is an organized component of the effusion adherent to  the right ventricle measuring 1cm.  There is significant respiratory variation across the  mitral valve.  There is partial collapse of the right  atrium and right ventricle.   Echocardiographic evidence of pericardial tamponade.  6. Bilateral pleural effusions.    Imaging:  < from: CT Chest No Cont (10.31.19 @ 21:04) >    Small pericardial and bilateral pleural effusions.  Interpretation of Telemetry: Afib    88F with arthritis and hypothyroidism presented with STOCK, found to be in Aflutter with RVR. Transferred to CCU for monitoring after echo evidence with moderate pericardial effusion with early signs of tamponade. The pt did not have clinical tamponade, and CT showed that the effusion was not large. It was not able to be drained by IR given the small size. The pt does, however, has b/l pleural effusions. It could be related to some diastolic failure, however, the etiology is not clear. Overall the pt is stable at this time.     #Pleural effusions  - Pt does have pleural effusions and was hypoxic on arrival.   - Lasix PO started during the day.   - Gentle diuresis given pericardial effusion and pre load dependance.   - Pt will nee f/u CXR after some diuresis to see that pleural effusions are resolving.     #pericardial effusion   - Unclear etiology at this time.   - Does not seem to be hemodynamically significant  - no clinical tamponade.   - No further intervention at this time.     #Atrial Fib  - Pt started on Eliquis for a-fib.   - Pt is currently rate controlled.   - Will change 12.5 lopressor q8 to 25 BID, for ease of administration at d/c

## 2019-11-03 NOTE — PROGRESS NOTE ADULT - SUBJECTIVE AND OBJECTIVE BOX
Admission date:  CHIEF COMPLAINT: SOB  HPI:  Pt is a 87 y/o Female with pmhx of arthritis coming in with progressively worsening shortness of breath x1 week. Patient states last week she started to feel short of breath when she would walk around or minimally exert herself, now for the last few days she has been feeling more short of breath with minimal movements. States that she has had some mild cough associated with this shortness of breath; however, denies any mucous production, fever or chills. Denies any chest pain associated with this. Denies ever having symptoms like this before. Does not use oxygen at home. (30 Oct 2019 17:15)    INTERVAL HISTORY:    REVIEW OF SYSTEMS:    MEDICATIONS  (STANDING):  apixaban 5 milliGRAM(s) Oral every 12 hours  furosemide    Tablet 20 milliGRAM(s) Oral daily  levothyroxine 25 MICROGram(s) Oral daily  metoprolol tartrate 25 milliGRAM(s) Oral two times a day    MEDICATIONS  (PRN):      Objective:  ICU Vital Signs Last 24 Hrs  T(C): 36.7 (03 Nov 2019 00:00), Max: 36.8 (02 Nov 2019 19:00)  T(F): 98 (03 Nov 2019 00:00), Max: 98.2 (02 Nov 2019 19:00)  HR: 87 (03 Nov 2019 05:00) (80 - 104)  BP: 136/69 (03 Nov 2019 05:00) (109/56 - 163/67)  BP(mean): 96 (03 Nov 2019 05:00) (76 - 105)  RR: 21 (03 Nov 2019 05:00) (16 - 37)  SpO2: 93% (03 Nov 2019 05:00) (92% - 99%)        11-02 @ 08:01  -  11-03 @ 07:00  --------------------------------------------------------  IN: 230 mL / OUT: 1100 mL / NET: -870 mL      Daily     Daily     PHYSICAL EXAM:      Constitutional:    HEENT:    Respiratory:    Cardiovascular:  Access site:    Gastrointestinal:    Genitourinary:    Extremities:    Vascular:    Neurological:    Skin:      TELEMETRY:     EKG:     IMAGING:    Labs:                          12.0   5.59  )-----------( 352      ( 03 Nov 2019 05:19 )             37.2     11-03    138  |  97  |  12  ----------------------------<  112<H>  4.1   |  31  |  0.66    Ca    8.7      03 Nov 2019 05:19  Phos  2.8     11-03  Mg     2.2     11-03    TPro  6.3  /  Alb  2.8<L>  /  TBili  0.3  /  DBili  x   /  AST  31  /  ALT  41  /  AlkPhos  82  11-03    LIVER FUNCTIONS - ( 03 Nov 2019 05:19 )  Alb: 2.8 g/dL / Pro: 6.3 g/dL / ALK PHOS: 82 U/L / ALT: 41 U/L / AST: 31 U/L / GGT: x           PTT - ( 02 Nov 2019 06:03 )  PTT:66.0 sec      HEALTH ISSUES - PROBLEM Dx:  Atrial flutter  Acute pulmonary edema with congestive heart failure  Acute diastolic (congestive) heart failure  Pleural effusion  Hypothyroidism, unspecified type  Atrial flutter, unspecified type  Pericardial effusion with cardiac tamponade  Atrial fibrillation  Prophylactic measure  Arthritis  Hypothyroid  Elevated LFTs  Hyponatremia  Dyspnea  Congestive heart disease Admission date: 10/30/19  CHIEF COMPLAINT: SOB  HPI:  Pt is a 87 y/o Female with PMHx of arthritis, recent diagnosis of hypothyroidism, coming in with progressively worsening shortness of breath x1 week. Patient states last week she started to feel short of breath when she would walk around or minimally exert herself, now for the last few days she has been feeling more short of breath with minimal movements. States that she has had some mild cough associated with this shortness of breath; however, denies any mucous production, fever or chills. Denies any chest pain associated with this. Denies ever having symptoms like this before. Does not use oxygen at home. (30 Oct 2019 17:15)    Patient diuresed upon admission, course c/b episode of Afib with RVR, now in NSR. TTE performed on 10/31, pt found to have pericardial effusion and elevated pericardial pressures, was transferred to CCU2 for monitoring. Pericardial effusion improving, too small for pericardiocentesis. Found additionally to have b/l pleural effusions.    INTERVAL HISTORY: Patient seen and examined, reports feeling well but endorses continued SOB at rest that worsens with exertion, was able to take "a few steps" to the bathroom before needing to rest yesterday.    REVIEW OF SYSTEMS:  Constitutional: Denies fever, chills, night sweats.  Respiratory: Endorses SOB, STOCK, mild pleuritic chest pain. Denies cough, sputum production, hemoptysis.  Cardiac: Denies chest pain, palpitations  GI: Denies abd pain, nausea, vomiting, diarrhea, constipation.    MEDICATIONS  (STANDING):  apixaban 5 milliGRAM(s) Oral every 12 hours  furosemide    Tablet 20 milliGRAM(s) Oral daily  levothyroxine 25 MICROGram(s) Oral daily  metoprolol tartrate 25 milliGRAM(s) Oral two times a day    Objective:  ICU Vital Signs Last 24 Hrs  T(C): 36.7 (03 Nov 2019 00:00), Max: 36.8 (02 Nov 2019 19:00)  T(F): 98 (03 Nov 2019 00:00), Max: 98.2 (02 Nov 2019 19:00)  HR: 87 (03 Nov 2019 05:00) (80 - 104)  BP: 136/69 (03 Nov 2019 05:00) (109/56 - 163/67)  BP(mean): 96 (03 Nov 2019 05:00) (76 - 105)  RR: 21 (03 Nov 2019 05:00) (16 - 37)  SpO2: 93% (03 Nov 2019 05:00) (92% - 99%)      11-02 @ 08:01  -  11-03 @ 07:00  --------------------------------------------------------  IN: 230 mL / OUT: 1100 mL / NET: -870 mL      PHYSICAL EXAM:  Constitutional:    HEENT:    Respiratory: b/s clear to auscultation b/l, diminished at bases.    Cardiovascular:    Gastrointestinal:    Genitourinary:    Extremities:    Vascular:    Neurological:    Skin:      TELEMETRY:     EKG:     IMAGING:    Labs:                          12.0   5.59  )-----------( 352      ( 03 Nov 2019 05:19 )             37.2     11-03    138  |  97  |  12  ----------------------------<  112<H>  4.1   |  31  |  0.66    Ca    8.7      03 Nov 2019 05:19  Phos  2.8     11-03  Mg     2.2     11-03    TPro  6.3  /  Alb  2.8<L>  /  TBili  0.3  /  DBili  x   /  AST  31  /  ALT  41  /  AlkPhos  82  11-03    LIVER FUNCTIONS - ( 03 Nov 2019 05:19 )  Alb: 2.8 g/dL / Pro: 6.3 g/dL / ALK PHOS: 82 U/L / ALT: 41 U/L / AST: 31 U/L / GGT: x           PTT - ( 02 Nov 2019 06:03 )  PTT:66.0 sec      HEALTH ISSUES - PROBLEM Dx:  Atrial flutter  Acute pulmonary edema with congestive heart failure  Acute diastolic (congestive) heart failure  Pleural effusion  Hypothyroidism, unspecified type  Atrial flutter, unspecified type  Pericardial effusion with cardiac tamponade  Atrial fibrillation  Prophylactic measure  Arthritis  Hypothyroid  Elevated LFTs  Hyponatremia  Dyspnea  Congestive heart disease Admission date: 10/30/19  CHIEF COMPLAINT: SOB  HPI:  Pt is a 87 y/o Female with PMHx of arthritis, recent diagnosis of hypothyroidism, coming in with progressively worsening shortness of breath x1 week. Patient states last week she started to feel short of breath when she would walk around or minimally exert herself, now for the last few days she has been feeling more short of breath with minimal movements. States that she has had some mild cough associated with this shortness of breath; however, denies any mucous production, fever or chills. Denies any chest pain associated with this. Denies ever having symptoms like this before. Does not use oxygen at home. (30 Oct 2019 17:15)    Patient diuresed upon admission, course c/b episode of AF with RVR, now in NSR. TTE performed on 10/31, pt found to have pericardial effusion and elevated pericardial pressures, was transferred to CCU2 for monitoring. Pericardial effusion improving, too small for pericardiocentesis. Found additionally to have b/l pleural effusions.    INTERVAL HISTORY: Patient seen and examined, reports feeling well but endorses continued SOB at rest that worsens with exertion, was able to take "a few steps" to the bathroom before needing to rest yesterday.    REVIEW OF SYSTEMS:  Constitutional: Denies fever, chills, night sweats.  Respiratory: Endorses SOB, STOCK, mild pleuritic chest pain. Denies cough, sputum production, hemoptysis.  Cardiac: Denies chest pain, palpitations  GI: Denies abd pain, nausea, vomiting, diarrhea, constipation.    MEDICATIONS  (STANDING):  apixaban 5 milliGRAM(s) Oral every 12 hours  furosemide    Tablet 20 milliGRAM(s) Oral daily  levothyroxine 25 MICROGram(s) Oral daily  metoprolol tartrate 25 milliGRAM(s) Oral two times a day    Objective:  ICU Vital Signs Last 24 Hrs  T(C): 36.7 (03 Nov 2019 00:00), Max: 36.8 (02 Nov 2019 19:00)  T(F): 98 (03 Nov 2019 00:00), Max: 98.2 (02 Nov 2019 19:00)  HR: 87 (03 Nov 2019 05:00) (80 - 104)  BP: 136/69 (03 Nov 2019 05:00) (109/56 - 163/67)  BP(mean): 96 (03 Nov 2019 05:00) (76 - 105)  RR: 21 (03 Nov 2019 05:00) (16 - 37)  SpO2: 93% (03 Nov 2019 05:00) (92% - 99%)      11-02 @ 08:01  -  11-03 @ 07:00  --------------------------------------------------------  IN: 230 mL / OUT: 1100 mL / NET: -870 mL      PHYSICAL EXAM:  Constitutional: well nourished, in NAD  HEENT: PERRL, EOMI. sclera nonicteric. Patent airway. Neck supple, no LAD.  Respiratory: b/s clear to auscultation b/l, diminished at bases.  Cardiovascular: RRR, S1S2, no murmurs/rubs/gallops. No JVD.  Gastrointestinal: Abd soft NT, ND. Normoactive BS.  Extremities: + peripheral pulses. No edema to LE, UE b/l.  Neurological: alert & oriented x3. Motor and sensation intact, nonfocal.  Skin: warm, dry, no cyanosis or pallor. No rashes, masses, lesions.      TELEMETRY: NSR, rate     EKG: NSR with occasional PVCs, Rate 88 bpm.    IMAGING:  < from: Transthoracic Echocardiogram (10.31.19 @ 14:48) >  Conclusions:  1. Mitral annular calcification, otherwisenormal mitral  valve. Mild mitral regurgitation.  2. Calcified trileaflet aortic valve with normal opening.  No aortic valve regurgitation seen.  3. Normal left ventricular systolic function. No segmental  wall motion abnormalities.  4. Normal right ventricular size and function.  5. Thickened pericardium. Moderate to large pericardial  effusion. The effusion measures 1.7 cm anterior to the  right ventricle and 2.2cm posterior to the left ventricle.  There is an organized component of the effusion adherent to  the right ventricle measuring 1cm.  There is significant respiratory variation accross the  mitral valve.  There is partial collapse of the right  atrium and right ventricle.   Echocardiographic evidence of pericardial tamponade.  6. Bilateral pleural effusions.    < from: CT Chest No Cont (10.31.19 @ 21:04) >  IMPRESSION:   Small pericardial and bilateral pleural effusions.      Labs:                        12.0   5.59  )-----------( 352      ( 03 Nov 2019 05:19 )             37.2     11-03    138  |  97  |  12  ----------------------------<  112<H>  4.1   |  31  |  0.66    Ca    8.7      03 Nov 2019 05:19  Phos  2.8     11-03  Mg     2.2     11-03    TPro  6.3  /  Alb  2.8<L>  /  TBili  0.3  /  DBili  x   /  AST  31  /  ALT  41  /  AlkPhos  82  11-03    LIVER FUNCTIONS - ( 03 Nov 2019 05:19 )  Alb: 2.8 g/dL / Pro: 6.3 g/dL / ALK PHOS: 82 U/L / ALT: 41 U/L / AST: 31 U/L / GGT: x           PTT - ( 02 Nov 2019 06:03 )  PTT:66.0 sec Admission date: 10/30/19  CHIEF COMPLAINT: SOB  HPI:  Pt is a 89 y/o Female with PMHx of arthritis, recent diagnosis of hypothyroidism, coming in with progressively worsening shortness of breath x1 week. Patient states last week she started to feel short of breath when she would walk around or minimally exert herself, now for the last few days she has been feeling more short of breath with minimal movements. States that she has had some mild cough associated with this shortness of breath; however, denies any mucous production, fever or chills. Denies any chest pain associated with this. Denies ever having symptoms like this before. Does not use oxygen at home. (30 Oct 2019 17:15)    Patient diuresed upon admission, course c/b episode of AF with RVR, now in NSR. TTE performed on 10/31, pt found to have pericardial effusion and elevated pericardial pressures, was transferred to CCU2 for monitoring. Pericardial effusion improving, too small for pericardiocentesis. Found additionally to have b/l pleural effusions.    INTERVAL HISTORY: Patient seen and examined, reports feeling well but endorses continued SOB at rest that worsens with exertion, was able to take "a few steps" to the bathroom before needing to rest yesterday.    REVIEW OF SYSTEMS:  Constitutional: Denies fever, chills, night sweats.  Respiratory: Endorses SOB, STOCK, mild pleuritic chest pain. Denies cough, sputum production, hemoptysis.  Cardiac: Denies chest pain, palpitations  GI: Denies abd pain, nausea, vomiting, diarrhea, constipation.    MEDICATIONS  (STANDING):  apixaban 5 milliGRAM(s) Oral every 12 hours  furosemide    Tablet 20 milliGRAM(s) Oral daily  levothyroxine 25 MICROGram(s) Oral daily  metoprolol tartrate 25 milliGRAM(s) Oral two times a day    Objective:  ICU Vital Signs Last 24 Hrs  T(C): 36.7 (03 Nov 2019 00:00), Max: 36.8 (02 Nov 2019 19:00)  T(F): 98 (03 Nov 2019 00:00), Max: 98.2 (02 Nov 2019 19:00)  HR: 87 (03 Nov 2019 05:00) (80 - 104)  BP: 136/69 (03 Nov 2019 05:00) (109/56 - 163/67)  BP(mean): 96 (03 Nov 2019 05:00) (76 - 105)  RR: 21 (03 Nov 2019 05:00) (16 - 37)  SpO2: 93% (03 Nov 2019 05:00) (92% - 99%)      11-02 @ 08:01  -  11-03 @ 07:00  --------------------------------------------------------  IN: 230 mL / OUT: 1100 mL / NET: -870 mL      PHYSICAL EXAM:  Constitutional: well nourished, in NAD  HEENT: PERRL, EOMI. sclera nonicteric. Patent airway. Neck supple, no LAD.  Respiratory: b/s clear to auscultation b/l, diminished at bases.  Cardiovascular: RRR, S1S2, no murmurs/rubs/gallops. No JVD.  Gastrointestinal: Abd soft NT, ND. Normoactive BS.  Extremities: + peripheral pulses. No edema to LE, UE b/l.  Neurological: alert & oriented x3. Motor and sensation intact, nonfocal.  Skin: warm, dry, no cyanosis or pallor. No rashes, masses, lesions.      TELEMETRY: NSR with PVC in trigeminy, rate 80.    EKG: NSR with occasional PVCs, Rate 88 bpm.    IMAGING:  < from: Transthoracic Echocardiogram (10.31.19 @ 14:48) >  Conclusions:  1. Mitral annular calcification, otherwisenormal mitral  valve. Mild mitral regurgitation.  2. Calcified trileaflet aortic valve with normal opening.  No aortic valve regurgitation seen.  3. Normal left ventricular systolic function. No segmental  wall motion abnormalities.  4. Normal right ventricular size and function.  5. Thickened pericardium. Moderate to large pericardial  effusion. The effusion measures 1.7 cm anterior to the  right ventricle and 2.2cm posterior to the left ventricle.  There is an organized component of the effusion adherent to  the right ventricle measuring 1cm.  There is significant respiratory variation accross the  mitral valve.  There is partial collapse of the right  atrium and right ventricle.   Echocardiographic evidence of pericardial tamponade.  6. Bilateral pleural effusions.    < from: CT Chest No Cont (10.31.19 @ 21:04) >  IMPRESSION:   Small pericardial and bilateral pleural effusions.      Labs:                        12.0   5.59  )-----------( 352      ( 03 Nov 2019 05:19 )             37.2     11-03    138  |  97  |  12  ----------------------------<  112<H>  4.1   |  31  |  0.66    Ca    8.7      03 Nov 2019 05:19  Phos  2.8     11-03  Mg     2.2     11-03    TPro  6.3  /  Alb  2.8<L>  /  TBili  0.3  /  DBili  x   /  AST  31  /  ALT  41  /  AlkPhos  82  11-03    LIVER FUNCTIONS - ( 03 Nov 2019 05:19 )  Alb: 2.8 g/dL / Pro: 6.3 g/dL / ALK PHOS: 82 U/L / ALT: 41 U/L / AST: 31 U/L / GGT: x           PTT - ( 02 Nov 2019 06:03 )  PTT:66.0 sec Admission date: 10/30/19  CHIEF COMPLAINT: SOB  HPI:  Pt is a 89 y/o Female with PMHx of arthritis, recent diagnosis of hypothyroidism, coming in with progressively worsening shortness of breath x1 week. Patient states last week she started to feel short of breath when she would walk around or minimally exert herself, now for the last few days she has been feeling more short of breath with minimal movements. States that she has had some mild cough associated with this shortness of breath; however, denies any mucous production, fever or chills. Denies any chest pain associated with this. Denies ever having symptoms like this before. Does not use oxygen at home. (30 Oct 2019 17:15)    Patient diuresed upon admission, course c/b episode of AF with RVR, now in NSR. TTE performed on 10/31, pt found to have pericardial effusion and elevated pericardial pressures, was transferred to CCU2 for monitoring. Pericardial effusion improving, too small for pericardiocentesis. Found additionally to have b/l pleural effusions.    INTERVAL HISTORY: Patient seen and examined, reports feeling well but endorses continued SOB at rest that worsens with exertion, was able to take "a few steps" to the bathroom before needing to rest yesterday.    REVIEW OF SYSTEMS:  Constitutional: Denies fever, chills, night sweats.  Respiratory: Endorses SOB, STOCK, mild pleuritic chest pain. Denies cough, sputum production, hemoptysis.  Cardiac: Denies chest pain, palpitations, orthopnea, PND.  GI: Denies abd pain, nausea, vomiting, diarrhea, constipation.    MEDICATIONS  (STANDING):  apixaban 5 milliGRAM(s) Oral every 12 hours  furosemide    Tablet 20 milliGRAM(s) Oral daily  levothyroxine 25 MICROGram(s) Oral daily  metoprolol tartrate 25 milliGRAM(s) Oral two times a day    Objective:  ICU Vital Signs Last 24 Hrs  T(C): 36.7 (03 Nov 2019 00:00), Max: 36.8 (02 Nov 2019 19:00)  T(F): 98 (03 Nov 2019 00:00), Max: 98.2 (02 Nov 2019 19:00)  HR: 87 (03 Nov 2019 05:00) (80 - 104)  BP: 136/69 (03 Nov 2019 05:00) (109/56 - 163/67)  BP(mean): 96 (03 Nov 2019 05:00) (76 - 105)  RR: 21 (03 Nov 2019 05:00) (16 - 37)  SpO2: 93% (03 Nov 2019 05:00) (92% - 99%)      11-02 @ 08:01  -  11-03 @ 07:00  --------------------------------------------------------  IN: 230 mL / OUT: 1100 mL / NET: -870 mL      PHYSICAL EXAM:  Constitutional: well nourished, in NAD  HEENT: PERRL, EOMI. sclera nonicteric. Patent airway. Neck supple, no LAD.  Respiratory: b/s clear to auscultation b/l, diminished at bases.  Cardiovascular: RRR, S1S2, no murmurs/rubs/gallops. No JVD.  Gastrointestinal: Abd soft NT, ND. Normoactive BS.  Extremities: + peripheral pulses. No edema to LE, UE b/l.  Neurological: alert & oriented x3. Motor and sensation intact, nonfocal.  Skin: warm, dry, no cyanosis or pallor. No rashes, masses, lesions.      TELEMETRY: NSR with PVC in trigeminy, rate 80.    EKG: NSR with occasional PVCs, Rate 88 bpm.    IMAGING:  < from: Transthoracic Echocardiogram (10.31.19 @ 14:48) >  Conclusions:  1. Mitral annular calcification, otherwisenormal mitral  valve. Mild mitral regurgitation.  2. Calcified trileaflet aortic valve with normal opening.  No aortic valve regurgitation seen.  3. Normal left ventricular systolic function. No segmental  wall motion abnormalities.  4. Normal right ventricular size and function.  5. Thickened pericardium. Moderate to large pericardial  effusion. The effusion measures 1.7 cm anterior to the  right ventricle and 2.2cm posterior to the left ventricle.  There is an organized component of the effusion adherent to  the right ventricle measuring 1cm.  There is significant respiratory variation accross the  mitral valve.  There is partial collapse of the right  atrium and right ventricle.   Echocardiographic evidence of pericardial tamponade.  6. Bilateral pleural effusions.    < from: CT Chest No Cont (10.31.19 @ 21:04) >  IMPRESSION:   Small pericardial and bilateral pleural effusions.      Labs:                        12.0   5.59  )-----------( 352      ( 03 Nov 2019 05:19 )             37.2     11-03    138  |  97  |  12  ----------------------------<  112<H>  4.1   |  31  |  0.66    Ca    8.7      03 Nov 2019 05:19  Phos  2.8     11-03  Mg     2.2     11-03    TPro  6.3  /  Alb  2.8<L>  /  TBili  0.3  /  DBili  x   /  AST  31  /  ALT  41  /  AlkPhos  82  11-03    LIVER FUNCTIONS - ( 03 Nov 2019 05:19 )  Alb: 2.8 g/dL / Pro: 6.3 g/dL / ALK PHOS: 82 U/L / ALT: 41 U/L / AST: 31 U/L / GGT: x           PTT - ( 02 Nov 2019 06:03 )  PTT:66.0 sec

## 2019-11-03 NOTE — PROGRESS NOTE ADULT - SUBJECTIVE AND OBJECTIVE BOX
Admission date:  CHIEF COMPLAINT: SOB/STOCK  HPI:  Pt is a 89 y/o Female with pmhx of arthritis coming in with progressively worsening shortness of breath x1 week. Patient states last week she started to feel short of breath when she would walk around or minimally exert herself, now for the last few days she has been feeling more short of breath with minimal movements. States that she has had some mild cough associated with this shortness of breath; however, denies any mucous production, fever or chills. Denies any chest pain associated with this. Denies ever having symptoms like this before. Does not use oxygen at home. (30 Oct 2019 17:15)    INTERVAL HISTORY: patient comfortable; no tele events    REVIEW OF SYSTEMS:    CONSTITUTIONAL: No weakness, fevers or chills  EYES/ENT: No visual changes;  No vertigo or throat pain   NECK: No pain or stiffness  RESPIRATORY: No cough, wheezing, hemoptysis; No shortness of breath  CARDIOVASCULAR: No chest pain or palpitations  GASTROINTESTINAL: No abdominal or epigastric pain. No nausea, vomiting, or hematemesis; No diarrhea or constipation. No melena or hematochezia.  GENITOURINARY: No dysuria, frequency or hematuria  NEUROLOGICAL: No numbness or weakness  SKIN: No itching, rashes      MEDICATIONS  (STANDING):  furosemide    Tablet 20 milliGRAM(s) Oral daily  heparin  Injectable 5000 Unit(s) SubCutaneous every 8 hours  levothyroxine 25 MICROGram(s) Oral daily  metoprolol tartrate 25 milliGRAM(s) Oral two times a day    MEDICATIONS  (PRN):      Objective:  ICU Vital Signs Last 24 Hrs  T(C): 36.7 (03 Nov 2019 20:00), Max: 36.7 (03 Nov 2019 00:00)  T(F): 98.1 (03 Nov 2019 20:00), Max: 98.1 (03 Nov 2019 20:00)  HR: 87 (03 Nov 2019 20:00) (79 - 104)  BP: 118/59 (03 Nov 2019 20:00) (108/55 - 178/93)  BP(mean): 83 (03 Nov 2019 20:00) (77 - 128)  ABP: --  ABP(mean): --  RR: 25 (03 Nov 2019 20:00) (21 - 38)  SpO2: 95% (03 Nov 2019 20:00) (93% - 97%)          11-02 @ 08:01  -  11-03 @ 07:00  --------------------------------------------------------  IN: 230 mL / OUT: 1100 mL / NET: -870 mL    11-03 @ 07:01  -  11-03 @ 21:05  --------------------------------------------------------  IN: 390 mL / OUT: 800 mL / NET: -410 mL      Daily     Daily     PHYSICAL EXAM:  General: in no acute distress  Neurology: A&Ox3, nonfocal, GRESHAM x 4  Respiratory: CTA B/L  CV: RRR, S1S2, no murmurs, rubs or gallops  Abdominal: Soft, NT, ND +BS  Extremities: No edema, + peripheral pulses        TELEMETRY: sinus rhythm; no events     EKG:   < from: 12 Lead ECG (11.01.19 @ 07:23) >  Diagnosis Line SINUS RHYTHM WITH OCCASIONAL PREMATURE VENTRICULAR COMPLEXES  LOW VOLTAGE QRS  SEPTAL INFARCT , AGE UNDETERMINED  ABNORMAL ECG    < end of copied text >      IMAGING:  < from: Transthoracic Echocardiogram (10.31.19 @ 14:48) >  Conclusions:  1. Mitral annular calcification, otherwisenormal mitral  valve. Mild mitral regurgitation.  2. Calcified trileaflet aortic valve with normal opening.  No aortic valve regurgitation seen.  3. Normal left ventricular systolic function. No segmental  wall motion abnormalities.  4. Normal right ventricular size and function.  5. Thickened pericardium. Moderate to large pericardial  effusion. The effusion measures 1.7 cm anterior to the  right ventricle and 2.2cm posterior to the left ventricle.  There is an organized component of the effusion adherent to  the right ventricle measuring 1cm.  There is significant respiratory variation accross the  mitral valve.  There is partial collapse of the right  atrium and right ventricle.   Echocardiographic evidence of pericardial tamponade.  6. Bilateral pleural effusions.    < end of copied text >      Labs:                          12.0   5.59  )-----------( 352      ( 03 Nov 2019 05:19 )             37.2     11-03    138  |  97  |  12  ----------------------------<  112<H>  4.1   |  31  |  0.66    Ca    8.7      03 Nov 2019 05:19  Phos  2.8     11-03  Mg     2.2     11-03    TPro  6.3  /  Alb  2.8<L>  /  TBili  0.3  /  DBili  x   /  AST  31  /  ALT  41  /  AlkPhos  82  11-03    LIVER FUNCTIONS - ( 03 Nov 2019 05:19 )  Alb: 2.8 g/dL / Pro: 6.3 g/dL / ALK PHOS: 82 U/L / ALT: 41 U/L / AST: 31 U/L / GGT: x           PTT - ( 02 Nov 2019 06:03 )  PTT:66.0 sec

## 2019-11-03 NOTE — PROGRESS NOTE ADULT - ASSESSMENT
89 y/o Female with PMHx of arthritis, recent diagnosis of hypothyroidism, presented with SOB, found to have pericardial effusion with evidence of tamponade, now resolving.

## 2019-11-03 NOTE — PROGRESS NOTE ADULT - ASSESSMENT
87 yo F w/ PMH arthritis who came in for evaluation of SOB X1 week. Found to have new onset heart failure and Afib. Moved to CCU2 due to concern for early tamponade on TTE 10/31/19. Pending IR drainage of pericardial effusion today. Nephrology is consulted for hyponatremia    Hyponatremia  - currently appears hypovolemic   - serum sodium improved  - Pt can continue oral diuretic at present dose   - encourage PO solute intake   - 1L fluid restricted diet  - low salt diet

## 2019-11-03 NOTE — PROGRESS NOTE ADULT - PROBLEM SELECTOR PLAN 4
-rate controlled  -continue lopressor 25mg BID  - per attending MD-due to patient age,fall risk and length of afib episode, anticoagulation not beneficial at this time; also patients family does not want her on it. Option to be revisited should patient have another episode of afib.  -Heparin 5000 units SQ every 8 hours ordered for inpatient DVT prophylaxis

## 2019-11-03 NOTE — PROGRESS NOTE ADULT - SUBJECTIVE AND OBJECTIVE BOX
Patient seen and examined at bedside  c/o persistent yet improving schmitt/sob  Case discussed with medical team    HPI:  Pt is a 87 y/o Female with pmhx of arthritis coming in with progressively worsening shortness of breath x1 week. Patient states last week she started to feel short of breath when she would walk around or minimally exert herself, now for the last few days she has been feeling more short of breath with minimal movements. States that she has had some mild cough associated with this shortness of breath; however, denies any mucous production, fever or chills. Denies any chest pain associated with this. Denies ever having symptoms like this before. Does not use oxygen at home. (30 Oct 2019 17:15)      PAST MEDICAL & SURGICAL HISTORY:  Hypothyroid  No pertinent past medical history  No significant past surgical history      No Known Allergies       MEDICATIONS  (STANDING):  apixaban 5 milliGRAM(s) Oral every 12 hours  furosemide    Tablet 20 milliGRAM(s) Oral daily  levothyroxine 25 MICROGram(s) Oral daily  metoprolol tartrate 25 milliGRAM(s) Oral two times a day    MEDICATIONS  (PRN):      REVIEW OF SYSTEMS:  CONSTITUTIONAL: (+) malaise.   EYES: No acute change in vision   ENT:  No tinnitus  NECK: No stiffness  RESPIRATORY: schmitt.sob. No hemoptysis  CARDIOVASCULAR: No active chest pain, palpitations, syncope  GASTROINTESTINAL: No hematemesis, diarrhea, melena, or hematochezia.  GENITOURINARY: No hematuria  NEUROLOGICAL: No headaches  LYMPH Nodes: No enlarged glands  ENDOCRINE: No heat or cold intolerance	    T(C): 36.5 (11-03-19 @ 08:00), Max: 36.8 (11-02-19 @ 19:00)  HR: 79 (11-03-19 @ 08:00) (79 - 104)  BP: 162/111 (11-03-19 @ 08:00) (109/56 - 178/93)  RR: 35 (11-03-19 @ 08:00) (19 - 38)  SpO2: 93% (11-03-19 @ 08:00) (92% - 99%)    PHYSICAL EXAMINATION:   Constitutional: WD, NAD  HEENT: NC, AT  Neck:  Supple  Respiratory:  Adequate airflow b/l. Not using accessory muscles of respiration.  Cardiovascular: decreased cardiac sounds yet S1 & S2 intact, sys murmur. no R/G, 2+ radial pulses b/l  Gastrointestinal: Soft, NT, ND, normoactive b.s., no organomegaly/RT/rigidity  Extremities: WWP  Neurological:  Alert and awake.  No acute focal motor deficits. Crude sensation intact.     Labs and imaging reviewed    LABS:                        12.0   5.59  )-----------( 352      ( 03 Nov 2019 05:19 )             37.2     11-03    138  |  97  |  12  ----------------------------<  112<H>  4.1   |  31  |  0.66    Ca    8.7      03 Nov 2019 05:19  Phos  2.8     11-03  Mg     2.2     11-03    TPro  6.3  /  Alb  2.8<L>  /  TBili  0.3  /  DBili  x   /  AST  31  /  ALT  41  /  AlkPhos  82  11-03        PTT - ( 02 Nov 2019 06:03 )  PTT:66.0 sec    CAPILLARY BLOOD GLUCOSE            LIVER FUNCTIONS - ( 03 Nov 2019 05:19 )  Alb: 2.8 g/dL / Pro: 6.3 g/dL / ALK PHOS: 82 U/L / ALT: 41 U/L / AST: 31 U/L / GGT: x               RADIOLOGY & ADDITIONAL STUDIES:

## 2019-11-03 NOTE — PROGRESS NOTE ADULT - PROBLEM SELECTOR PLAN 2
- b/l pleural effusions, now resolved on chest xray  - SpO2 93-98% on room air, SOB improved, lungs clear on exam.  - Patient ambulating with assistance, prior to admission ambulated on own. PT consult ordered  - cont to monitor.

## 2019-11-04 ENCOUNTER — INBOUND DOCUMENT (OUTPATIENT)
Age: 84
End: 2019-11-04

## 2019-11-04 ENCOUNTER — TRANSCRIPTION ENCOUNTER (OUTPATIENT)
Age: 84
End: 2019-11-04

## 2019-11-04 VITALS
SYSTOLIC BLOOD PRESSURE: 109 MMHG | DIASTOLIC BLOOD PRESSURE: 54 MMHG | RESPIRATION RATE: 22 BRPM | OXYGEN SATURATION: 95 % | HEART RATE: 88 BPM

## 2019-11-04 DIAGNOSIS — I31.3 PERICARDIAL EFFUSION (NONINFLAMMATORY): ICD-10-CM

## 2019-11-04 DIAGNOSIS — I50.9 HEART FAILURE, UNSPECIFIED: ICD-10-CM

## 2019-11-04 PROBLEM — Z00.00 ENCOUNTER FOR PREVENTIVE HEALTH EXAMINATION: Status: ACTIVE | Noted: 2019-11-04

## 2019-11-04 LAB
ALBUMIN SERPL ELPH-MCNC: 2.8 G/DL — LOW (ref 3.3–5)
ALP SERPL-CCNC: 82 U/L — SIGNIFICANT CHANGE UP (ref 40–120)
ALT FLD-CCNC: 34 U/L — SIGNIFICANT CHANGE UP (ref 10–45)
ANION GAP SERPL CALC-SCNC: 9 MMOL/L — SIGNIFICANT CHANGE UP (ref 5–17)
AST SERPL-CCNC: 29 U/L — SIGNIFICANT CHANGE UP (ref 10–40)
BASOPHILS # BLD AUTO: 0.04 K/UL — SIGNIFICANT CHANGE UP (ref 0–0.2)
BASOPHILS NFR BLD AUTO: 0.6 % — SIGNIFICANT CHANGE UP (ref 0–2)
BILIRUB SERPL-MCNC: 0.3 MG/DL — SIGNIFICANT CHANGE UP (ref 0.2–1.2)
BUN SERPL-MCNC: 13 MG/DL — SIGNIFICANT CHANGE UP (ref 7–23)
CALCIUM SERPL-MCNC: 9 MG/DL — SIGNIFICANT CHANGE UP (ref 8.4–10.5)
CHLORIDE SERPL-SCNC: 96 MMOL/L — SIGNIFICANT CHANGE UP (ref 96–108)
CO2 SERPL-SCNC: 30 MMOL/L — SIGNIFICANT CHANGE UP (ref 22–31)
CREAT SERPL-MCNC: 0.65 MG/DL — SIGNIFICANT CHANGE UP (ref 0.5–1.3)
EOSINOPHIL # BLD AUTO: 0.32 K/UL — SIGNIFICANT CHANGE UP (ref 0–0.5)
EOSINOPHIL NFR BLD AUTO: 4.9 % — SIGNIFICANT CHANGE UP (ref 0–6)
GLUCOSE SERPL-MCNC: 119 MG/DL — HIGH (ref 70–99)
HCT VFR BLD CALC: 36.2 % — SIGNIFICANT CHANGE UP (ref 34.5–45)
HGB BLD-MCNC: 11.5 G/DL — SIGNIFICANT CHANGE UP (ref 11.5–15.5)
IMM GRANULOCYTES NFR BLD AUTO: 0.3 % — SIGNIFICANT CHANGE UP (ref 0–1.5)
LYMPHOCYTES # BLD AUTO: 1.85 K/UL — SIGNIFICANT CHANGE UP (ref 1–3.3)
LYMPHOCYTES # BLD AUTO: 28.3 % — SIGNIFICANT CHANGE UP (ref 13–44)
MAGNESIUM SERPL-MCNC: 2.2 MG/DL — SIGNIFICANT CHANGE UP (ref 1.6–2.6)
MCHC RBC-ENTMCNC: 28.1 PG — SIGNIFICANT CHANGE UP (ref 27–34)
MCHC RBC-ENTMCNC: 31.8 GM/DL — LOW (ref 32–36)
MCV RBC AUTO: 88.5 FL — SIGNIFICANT CHANGE UP (ref 80–100)
MONOCYTES # BLD AUTO: 0.68 K/UL — SIGNIFICANT CHANGE UP (ref 0–0.9)
MONOCYTES NFR BLD AUTO: 10.4 % — SIGNIFICANT CHANGE UP (ref 2–14)
NEUTROPHILS # BLD AUTO: 3.63 K/UL — SIGNIFICANT CHANGE UP (ref 1.8–7.4)
NEUTROPHILS NFR BLD AUTO: 55.5 % — SIGNIFICANT CHANGE UP (ref 43–77)
NRBC # BLD: 0 /100 WBCS — SIGNIFICANT CHANGE UP (ref 0–0)
PHOSPHATE SERPL-MCNC: 3 MG/DL — SIGNIFICANT CHANGE UP (ref 2.5–4.5)
PLATELET # BLD AUTO: 338 K/UL — SIGNIFICANT CHANGE UP (ref 150–400)
POTASSIUM SERPL-MCNC: 4.2 MMOL/L — SIGNIFICANT CHANGE UP (ref 3.5–5.3)
POTASSIUM SERPL-SCNC: 4.2 MMOL/L — SIGNIFICANT CHANGE UP (ref 3.5–5.3)
PROT SERPL-MCNC: 6.5 G/DL — SIGNIFICANT CHANGE UP (ref 6–8.3)
RBC # BLD: 4.09 M/UL — SIGNIFICANT CHANGE UP (ref 3.8–5.2)
RBC # FLD: 13.5 % — SIGNIFICANT CHANGE UP (ref 10.3–14.5)
SODIUM SERPL-SCNC: 135 MMOL/L — SIGNIFICANT CHANGE UP (ref 135–145)
WBC # BLD: 6.54 K/UL — SIGNIFICANT CHANGE UP (ref 3.8–10.5)
WBC # FLD AUTO: 6.54 K/UL — SIGNIFICANT CHANGE UP (ref 3.8–10.5)

## 2019-11-04 PROCEDURE — 87633 RESP VIRUS 12-25 TARGETS: CPT

## 2019-11-04 PROCEDURE — 82553 CREATINE MB FRACTION: CPT

## 2019-11-04 PROCEDURE — 93306 TTE W/DOPPLER COMPLETE: CPT | Mod: 26

## 2019-11-04 PROCEDURE — 99285 EMERGENCY DEPT VISIT HI MDM: CPT | Mod: 25

## 2019-11-04 PROCEDURE — 83735 ASSAY OF MAGNESIUM: CPT

## 2019-11-04 PROCEDURE — 85730 THROMBOPLASTIN TIME PARTIAL: CPT

## 2019-11-04 PROCEDURE — C1894: CPT

## 2019-11-04 PROCEDURE — 84100 ASSAY OF PHOSPHORUS: CPT

## 2019-11-04 PROCEDURE — 71045 X-RAY EXAM CHEST 1 VIEW: CPT

## 2019-11-04 PROCEDURE — 81001 URINALYSIS AUTO W/SCOPE: CPT

## 2019-11-04 PROCEDURE — 86850 RBC ANTIBODY SCREEN: CPT

## 2019-11-04 PROCEDURE — 84484 ASSAY OF TROPONIN QUANT: CPT

## 2019-11-04 PROCEDURE — 86901 BLOOD TYPING SEROLOGIC RH(D): CPT

## 2019-11-04 PROCEDURE — 93010 ELECTROCARDIOGRAM REPORT: CPT

## 2019-11-04 PROCEDURE — 80053 COMPREHEN METABOLIC PANEL: CPT

## 2019-11-04 PROCEDURE — 83935 ASSAY OF URINE OSMOLALITY: CPT

## 2019-11-04 PROCEDURE — 82436 ASSAY OF URINE CHLORIDE: CPT

## 2019-11-04 PROCEDURE — 83880 ASSAY OF NATRIURETIC PEPTIDE: CPT

## 2019-11-04 PROCEDURE — 86900 BLOOD TYPING SEROLOGIC ABO: CPT

## 2019-11-04 PROCEDURE — 87486 CHLMYD PNEUM DNA AMP PROBE: CPT

## 2019-11-04 PROCEDURE — 71250 CT THORAX DX C-: CPT

## 2019-11-04 PROCEDURE — 87798 DETECT AGENT NOS DNA AMP: CPT

## 2019-11-04 PROCEDURE — 85610 PROTHROMBIN TIME: CPT

## 2019-11-04 PROCEDURE — 84550 ASSAY OF BLOOD/URIC ACID: CPT

## 2019-11-04 PROCEDURE — 87581 M.PNEUMON DNA AMP PROBE: CPT

## 2019-11-04 PROCEDURE — 84443 ASSAY THYROID STIM HORMONE: CPT

## 2019-11-04 PROCEDURE — 85027 COMPLETE CBC AUTOMATED: CPT

## 2019-11-04 PROCEDURE — 71045 X-RAY EXAM CHEST 1 VIEW: CPT | Mod: 26

## 2019-11-04 PROCEDURE — 93005 ELECTROCARDIOGRAM TRACING: CPT

## 2019-11-04 PROCEDURE — 83036 HEMOGLOBIN GLYCOSYLATED A1C: CPT

## 2019-11-04 PROCEDURE — 84300 ASSAY OF URINE SODIUM: CPT

## 2019-11-04 PROCEDURE — 36415 COLL VENOUS BLD VENIPUNCTURE: CPT

## 2019-11-04 PROCEDURE — 80061 LIPID PANEL: CPT

## 2019-11-04 PROCEDURE — 93306 TTE W/DOPPLER COMPLETE: CPT

## 2019-11-04 PROCEDURE — 97161 PT EVAL LOW COMPLEX 20 MIN: CPT

## 2019-11-04 RX ORDER — METOPROLOL TARTRATE 50 MG
1 TABLET ORAL
Qty: 60 | Refills: 0
Start: 2019-11-04

## 2019-11-04 RX ORDER — FUROSEMIDE 40 MG
1 TABLET ORAL
Qty: 30 | Refills: 0
Start: 2019-11-04

## 2019-11-04 RX ORDER — LEVOTHYROXINE SODIUM 125 MCG
1 TABLET ORAL
Qty: 0 | Refills: 0 | DISCHARGE

## 2019-11-04 RX ADMIN — Medication 20 MILLIGRAM(S): at 05:46

## 2019-11-04 RX ADMIN — HEPARIN SODIUM 5000 UNIT(S): 5000 INJECTION INTRAVENOUS; SUBCUTANEOUS at 05:46

## 2019-11-04 RX ADMIN — Medication 25 MILLIGRAM(S): at 05:46

## 2019-11-04 RX ADMIN — Medication 25 MICROGRAM(S): at 05:46

## 2019-11-04 NOTE — DISCHARGE NOTE PROVIDER - NSDCFUADDINST_GEN_ALL_CORE_FT
Please call Dr. Cabrera's office for follow-up appointment within 2 weeks of discharge from the hospital.

## 2019-11-04 NOTE — DIETITIAN INITIAL EVALUATION ADULT. - ADD RECOMMEND
1) Change diet to DASH/TLC diet with 1 Ensure Enlive daily. 2) Pt aware RD remains available for any concerns, questions or diet preferences 3) Monitor PO intake and weights

## 2019-11-04 NOTE — PROGRESS NOTE ADULT - PROBLEM SELECTOR PLAN 1
improving s/s overall  tele, monitor vitals and clinical status closely  management appreciated by all team members

## 2019-11-04 NOTE — DIETITIAN INITIAL EVALUATION ADULT. - ENERGY NEEDS
Ht: 63 inches Wt: 133 pounds BMI: 23.6 kg/m2 IBW: 115 pounds(+/-10%) 116%IBW  no edema. no pressure ulcers documented.

## 2019-11-04 NOTE — DISCHARGE NOTE PROVIDER - CARE PROVIDER_API CALL
Mendez Cabrera (MD)  Cardiovascular Disease; Internal Medicine  [address], [facility] [address]  [place] [address]  Phone: [phone]  Fax: [phone]  Follow Up Time:

## 2019-11-04 NOTE — DISCHARGE NOTE NURSING/CASE MANAGEMENT/SOCIAL WORK - PATIENT PORTAL LINK FT
You can access the FollowMyHealth Patient Portal offered by [facility] by registering at the following website: [email or web address]. By joining [employer]’s FollowMyHealth portal, you will also be able to view your health information using other applications (apps) compatible with our system.

## 2019-11-04 NOTE — DIETITIAN INITIAL EVALUATION ADULT. - PHYSICAL APPEARANCE
contracted/Pt appears appropriately developed for advanced age, no visual signs of overt muscle/fat wasting.

## 2019-11-04 NOTE — PROGRESS NOTE ADULT - ATTENDING COMMENTS
Patient is seen and examined with fellow, NP and the CCU house-staff. I agree with the history, physical and the assessment and plan.  repeat TTE to re-assess the effusion   PT evaluation

## 2019-11-04 NOTE — PROGRESS NOTE ADULT - PROBLEM SELECTOR PLAN 2
- b/l pleural effusions, resolved on chest xray  - SpO2 93-98% on room air, SOB improved, lungs clear on exam.  - Patient ambulating with assistance, prior to admission ambulated on own. PT consulted.  - cont to monitor

## 2019-11-04 NOTE — DIETITIAN INITIAL EVALUATION ADULT. - OTHER INFO
Intake PTA: Pt endorses decreased appetite/PO intake a few days PTA due to SOB. Pt states she does not add salt to her food, although mainly eats pre-prepared foods, diet is also high in bologna sandwiches. Pt is unfamiliar with salt content of foods. Pt also takes 1 strawberry nutrition drink at home-unable to specify what brand.    Confirms NKFA, no nausea/vomiting, no difficulty chewing/swallowing, no GI distress, last BM 11/2, no micronutrient supplementation PTA.      Diet: Pt endorses improved appetite yesterday and today since she has been feeling better.     Education: The following were discussed with the pt: relationship of sodium intake and fluid retention, checking daily weights, fluid restrictions, recommended diet modifications, cooking with less salt, sodium contents of foods. Written education also provided. Encouraged pt to continue to take nutrition drink at home, in between meals to increase calorie and protein intake.    Weight Hx: Pt endorses  pounds. Per sunrise: 141 lbs (10/30), 139 lbs (10/31), 134 lbs (11/2) and 133.6 lbs (11/4). Noted pt currently is receiving lasix.

## 2019-11-04 NOTE — DISCHARGE NOTE PROVIDER - HOSPITAL COURSE
Pt is a 89 y/o Female with PMHx of arthritis, recent diagnosis of hypothyroidism, coming in with progressively worsening shortness of breath x1 week. Patient states last week she started to feel short of breath when she would walk around or minimally exert herself, now for the last few days she has been feeling more short of breath with minimal movements. States that she has had some mild cough associated with this shortness of breath; however, denies any mucous production, fever or chills. Denies any chest pain associated with this. Denies ever having symptoms like this before. Does not use oxygen at home. (30 Oct 2019 17:15)        Patient diuresed upon admission, course c/b episode of AF with RVR, now in NSR. TTE performed on 10/31, pt found to have pericardial effusion and elevated pericardial pressures, was transferred to CCU2 for monitoring. Pericardial effusion improving, too small for pericardiocentesis. Found additionally to have b/l pleural effusions. Pt is a 89 y/o Female with PMHx of arthritis, recent diagnosis of hypothyroidism, coming in with progressively worsening shortness of breath x1 week. Patient states last week she started to feel short of breath when she would walk around or minimally exert herself, now for the last few days she has been feeling more short of breath with minimal movements. States that she has had some mild cough associated with this shortness of breath; however, denies any mucous production, fever or chills. Denies any chest pain associated with this. Denies ever having symptoms like this before. Does not use oxygen at home. (30 Oct 2019 17:15)        Patient diuresed upon admission, course c/b episode of AF with RVR, now in NSR. TTE performed on 10/31, pt found to have pericardial effusion and elevated pericardial pressures, was transferred to CCU2 for monitoring. Pericardial effusion improving, too small for pericardiocentesis. Found additionally to have b/l pleural effusions. Repeat TTE performed on 11/4. Discharge home with home PT. Pt is a 87 y/o Female with PMHx of arthritis, recent diagnosis of hypothyroidism, coming in with progressively worsening shortness of breath x1 week. Patient states last week she started to feel short of breath when she would walk around or minimally exert herself, now for the last few days she has been feeling more short of breath with minimal movements. States that she has had some mild cough associated with this shortness of breath; however, denies any mucous production, fever or chills. Denies any chest pain associated with this. Denies ever having symptoms like this before. Does not use oxygen at home. (30 Oct 2019 17:15)        Patient diuresed upon admission, course c/b episode of AF with RVR, now in NSR. TTE performed on 10/31, pt found to have pericardial effusion and elevated pericardial pressures, was transferred to CCU2 for monitoring. Pericardial effusion improving, too small for pericardiocentesis. Found additionally to have b/l pleural effusions. Repeat TTE performed on 11/4; from: Transthoracic Echocardiogram (11.04.19 @ 10:12) Compared with echocardiogram of 10/31/2019, the pericardial effusion is smaller especially posterior and    lateral to the LV. Early diastolic collapse of the right atrium is again seen on today's study. Per Dr Mota pt discharged home. Discharge home with home PT. Family

## 2019-11-04 NOTE — PROGRESS NOTE ADULT - PROBLEM SELECTOR PLAN 5
-continue on synthroid
.  - presented with acute pulmonary edema  -pleural effusions resolved.  - c/w Lasix 20 daily, Lopressor 25 BID
monitor LFTs  may obtain liver US if cont to rise   not on any medications
monitor Na level   diuresis   Renal eval appreciated   I and Os
relatively stable
.  - presented with acute pulmonary edema  -pleural effusions resolved.  - c/w Lasix 20 daily, Lopressor 25 BID

## 2019-11-04 NOTE — PROGRESS NOTE ADULT - SUBJECTIVE AND OBJECTIVE BOX
Admission date: 10/30/19  CHIEF COMPLAINT: SOB  HPI:  87 y/o Female with PMHx of arthritis, hypothyroid coming in with progressively worsening shortness of breath x1 week. Patient states last week she started to feel short of breath when she would walk around or minimally exert herself, now for the last few days she has been feeling more short of breath with minimal movements. States that she has had some mild cough associated with this shortness of breath; however, denies any mucous production, fever or chills. Denies any chest pain associated with this. Denies ever having symptoms like this before. Does not use oxygen at home. (30 Oct 2019 17:15)    INTERVAL HISTORY:    REVIEW OF SYSTEMS:     MEDICATIONS  (STANDING):  furosemide    Tablet 20 milliGRAM(s) Oral daily  heparin  Injectable 5000 Unit(s) SubCutaneous every 8 hours  levothyroxine 25 MICROGram(s) Oral daily  metoprolol tartrate 25 milliGRAM(s) Oral two times a day    Objective:  ICU Vital Signs Last 24 Hrs  T(C): 36.7 (2019 05:00), Max: 36.7 (2019 20:00)  T(F): 98 (2019 05:00), Max: 98.1 (2019 20:00)  HR: 73 (2019 07:00) (72 - 104)  BP: 96/54 (2019 07:00) (96/54 - 162/111)  BP(mean): 72 (2019 07:00) (72 - 128)  RR: 18 (2019 07:00) (17 - 35)  SpO2: 95% (2019 07:00) (93% - 97%)       @ 07:01  -   @ 07:00  --------------------------------------------------------  IN: 510 mL / OUT: 1250 mL / NET: -740 mL    Daily Weight in k.6 (2019 05:00)    PHYSICAL EXAM:  Constitutional:  HEENT:  Respiratory:  Cardiovascular:  Gastrointestinal:  Genitourinary:  Extremities:  Vascular:  Neurological:  Skin:      TELEMETRY:     EKG:     IMAGING:    Labs:                          11.5   6.54  )-----------( 338      ( 2019 05:14 )             36.2     -    135  |  96  |  13  ----------------------------<  119<H>  4.2   |  30  |  0.65    Ca    9.0      2019 05:14  Phos  3.0       Mg     2.2         TPro  6.5  /  Alb  2.8<L>  /  TBili  0.3  /  DBili  x   /  AST  29  /  ALT  34  /  AlkPhos  82  11-    LIVER FUNCTIONS - ( 2019 05:14 )  Alb: 2.8 g/dL / Pro: 6.5 g/dL / ALK PHOS: 82 U/L / ALT: 34 U/L / AST: 29 U/L / GGT: x

## 2019-11-04 NOTE — PROGRESS NOTE ADULT - PROBLEM SELECTOR PLAN 1
-IR drainage of pericardial effusion aborted, as effusion now much smaller  -Small but steady improvement in dyspnea   -Now on RA

## 2019-11-04 NOTE — PROGRESS NOTE ADULT - PROBLEM SELECTOR PLAN 3
- rate controlled  - c/w Lopressor 25mg BID  - c/w Eliquis 5mg BID - rate controlled  - no further episodes of afib, and has not been chronic - Considering non-chronicity of afib and Patient is a greater fall risk will d/c Eliquis 5mg  - c/w Lopressor 25mg BID

## 2019-11-04 NOTE — PROGRESS NOTE ADULT - REASON FOR ADMISSION
Hypothyroid  Pericardial effusion   Paroxysmal AF
SOB, CHF exacerbation

## 2019-11-04 NOTE — PROGRESS NOTE ADULT - PROBLEM SELECTOR PROBLEM 1
Atrial fibrillation
Pericardial effusion with cardiac tamponade

## 2019-11-04 NOTE — PROGRESS NOTE ADULT - PROBLEM SELECTOR PLAN 1
- mod-large pericardial effusion with evidence of tamponade on echo (10/31)  - planned for IR pericardiocentesis, effusion resolving and too small for intervention  - Vitals remain stable, no evidence of tamponade.

## 2019-11-04 NOTE — DISCHARGE NOTE PROVIDER - NSDCMRMEDTOKEN_GEN_ALL_CORE_FT
cholecalciferol oral tablet: 1000 unit(s) orally once a day  docusate sodium 100 mg oral capsule: 1 cap(s) orally 2 times a day  metoprolol tartrate 25 mg oral tablet: 1 tab(s) orally 2 times a day  Mucinex: as needed  Synthroid 25 mcg (0.025 mg) oral tablet: 1 tab(s) orally once a day  Tylenol 325 mg oral tablet: as needed  Visine: as needed cholecalciferol oral tablet: 1000 unit(s) orally once a day  docusate sodium 100 mg oral capsule: 1 cap(s) orally 2 times a day  furosemide 20 mg oral tablet: 1 tab(s) orally once a day  metoprolol tartrate 25 mg oral tablet: 1 tab(s) orally 2 times a day  Mucinex: as needed  Synthroid 25 mcg (0.025 mg) oral tablet: 1 tab(s) orally once a day  Tylenol 325 mg oral tablet: as needed  Visine: as needed cholecalciferol oral tablet: 1000 unit(s) orally once a day  commode: 1 application buccal once a day   docusate sodium 100 mg oral capsule: 1 cap(s) orally 2 times a day  furosemide 20 mg oral tablet: 1 tab(s) orally once a day  metoprolol tartrate 25 mg oral tablet: 1 tab(s) orally 2 times a day  Mucinex: as needed  Rolling walker : 1 application buccal once a day   Synthroid 25 mcg (0.025 mg) oral tablet: 1 tab(s) orally once a day  Transport wheelchair: 1 applicatorful buccal once a day   Tylenol 325 mg oral tablet: as needed  Visine: as needed

## 2019-11-04 NOTE — PROGRESS NOTE ADULT - PROBLEM SELECTOR PROBLEM 5
Acute diastolic (congestive) heart failure
Elevated LFTs
Hyponatremia
Hypothyroidism, unspecified type
Acute diastolic (congestive) heart failure

## 2019-11-04 NOTE — DIETITIAN INITIAL EVALUATION ADULT. - REASON INDICATOR FOR ASSESSMENT
Pt seen for length of stay on CCU2.  Source: Pt, pt's son-in-law, EMR  Per chart, 87 y/o female admitted for SOB x1 week, found with pericardial effusion with tamponade, acute CHF exacerbation.  PMH: hypothyroid, arthritis

## 2019-11-04 NOTE — PROGRESS NOTE ADULT - PROBLEM SELECTOR PROBLEM 3
Acute congestive heart failure, unspecified heart failure type
Acute diastolic (congestive) heart failure
Atrial fibrillation
Atrial flutter
Atrial flutter
Dyspnea
Hyponatremia
Atrial fibrillation

## 2019-11-04 NOTE — DISCHARGE NOTE PROVIDER - NSDCCPCAREPLAN_GEN_ALL_CORE_FT
PRINCIPAL DISCHARGE DIAGNOSIS  Diagnosis: Congestive heart disease  Assessment and Plan of Treatment: PRINCIPAL DISCHARGE DIAGNOSIS  Diagnosis: Congestive heart disease  Assessment and Plan of Treatment: If you develop any chest pain, palpitations, or shortness of breath, your symptoms may be due to accumulation of fluid around the heart sac called the pericardium. In this case, please go to the nearest emergency room for further evaluation. If you need assistance immediately, call 911. PRINCIPAL DISCHARGE DIAGNOSIS  Diagnosis: Pericardial effusion  Assessment and Plan of Treatment: The normal pericardium is a fibroelastic sac surrounding the heart that contains a thin layer of fluid. A pericardial effusion is considered to be present when accumulated fluid within the sac exceeds the small amount that is normally present. Pericardial effusions may develop rapidly (acute) or more gradually (subacute or chronic). The normal pericardium can stretch to accommodate increases in pericardial volume, with the amount of stretch related to how quickly the effusion develops.  A test called echocardiogram is done which shows if there is fluid.  You were monitored and echocardiogram on 11/4 showed the effusion was smaller.  You are being discharged home with home physical therapy to be folllowed up with appointment with Dr Cabrera.      SECONDARY DISCHARGE DIAGNOSES  Diagnosis: Hypothyroidism, unspecified type  Assessment and Plan of Treatment: you do not make enough thyroid hormone  signs & symptoms of low levels of thyroid hormone - tired, getting cold easily, coarse or thin hair, constipation, shortness of breath, swelling, irregular periods. Your doctor will do thyroid hormone blood tests at least once a year to monitor if medication dose is adequate  take your thyroid medicine as directed by your doctor & on empty stomach

## 2019-11-04 NOTE — PROGRESS NOTE ADULT - SUBJECTIVE AND OBJECTIVE BOX
Follow-up Pulm Progress Note    Still c/o intermittent dyspnea but improving  99% on RA    Medications:  MEDICATIONS  (STANDING):  furosemide    Tablet 20 milliGRAM(s) Oral daily  heparin  Injectable 5000 Unit(s) SubCutaneous every 8 hours  levothyroxine 25 MICROGram(s) Oral daily  metoprolol tartrate 25 milliGRAM(s) Oral two times a day    MEDICATIONS  (PRN):          Vital Signs Last 24 Hrs  T(C): 36.8 (04 Nov 2019 08:00), Max: 36.8 (04 Nov 2019 08:00)  T(F): 98.2 (04 Nov 2019 08:00), Max: 98.2 (04 Nov 2019 08:00)  HR: 75 (04 Nov 2019 10:00) (72 - 104)  BP: 123/58 (04 Nov 2019 10:00) (96/54 - 158/68)  BP(mean): 83 (04 Nov 2019 10:00) (72 - 115)  RR: 28 (04 Nov 2019 10:00) (17 - 34)  SpO2: 96% (04 Nov 2019 10:00) (93% - 97%) on RA          11-03 @ 07:01  -  11-04 @ 07:00  --------------------------------------------------------  IN: 510 mL / OUT: 1250 mL / NET: -740 mL          LABS:                        11.5   6.54  )-----------( 338      ( 04 Nov 2019 05:14 )             36.2     11-04    135  |  96  |  13  ----------------------------<  119<H>  4.2   |  30  |  0.65    Ca    9.0      04 Nov 2019 05:14  Phos  3.0     11-04  Mg     2.2     11-04    TPro  6.5  /  Alb  2.8<L>  /  TBili  0.3  /  DBili  x   /  AST  29  /  ALT  34  /  AlkPhos  82  11-04          CAPILLARY BLOOD GLUCOSE                            CULTURES: (if applicable)  RVP: Negative           Physical Examination:  PULM: Decreased BS at bases L>R  CVS: S1, S2 heard    RADIOLOGY REVIEWED  CXR: Cardiomegaly   Improved L effusion  Trace R effusion

## 2019-11-04 NOTE — PROGRESS NOTE ADULT - PROBLEM SELECTOR PLAN 2
L>R pleural effusion likely pleuro-pericardial effusion with atelectasis  -Keep O>I as tolerated  -L pleural effusion improving on CXR

## 2019-11-04 NOTE — PROGRESS NOTE ADULT - PROBLEM SELECTOR PROBLEM 4
Acute pulmonary edema with congestive heart failure
Atrial fibrillation, unspecified type
Hyponatremia
Hypothyroid
Hypothyroidism, unspecified type
Hypothyroid

## 2019-11-04 NOTE — PROGRESS NOTE ADULT - SUBJECTIVE AND OBJECTIVE BOX
Patient seen and examined at bedside  No acute events noted overnight  Case discussed with medical team    HPI:  Pt is a 87 y/o Female with pmhx of arthritis coming in with progressively worsening shortness of breath x1 week. Patient states last week she started to feel short of breath when she would walk around or minimally exert herself, now for the last few days she has been feeling more short of breath with minimal movements. States that she has had some mild cough associated with this shortness of breath; however, denies any mucous production, fever or chills. Denies any chest pain associated with this. Denies ever having symptoms like this before. Does not use oxygen at home. (30 Oct 2019 17:15)      PAST MEDICAL & SURGICAL HISTORY:  Hypothyroid  No pertinent past medical history  No significant past surgical history      No Known Allergies       MEDICATIONS  (STANDING):  furosemide    Tablet 20 milliGRAM(s) Oral daily  heparin  Injectable 5000 Unit(s) SubCutaneous every 8 hours  levothyroxine 25 MICROGram(s) Oral daily  metoprolol tartrate 25 milliGRAM(s) Oral two times a day    MEDICATIONS  (PRN):      REVIEW OF SYSTEMS:  CONSTITUTIONAL: (+) malaise.   EYES: No acute change in vision   ENT:  No tinnitus  NECK: No stiffness  RESPIRATORY:improving sob/schmitt. No hemoptysis  CARDIOVASCULAR: No chest pain, palpitations, syncope  GASTROINTESTINAL: No hematemesis, diarrhea, melena, or hematochezia.  GENITOURINARY: No hematuria  NEUROLOGICAL: No headaches  LYMPH Nodes: No enlarged glands  ENDOCRINE: No heat or cold intolerance	    T(C): 36.7 (11-04-19 @ 14:00), Max: 36.8 (11-04-19 @ 08:00)  HR: 88 (11-04-19 @ 15:00) (72 - 104)  BP: 109/54 (11-04-19 @ 15:00) (96/54 - 158/68)  RR: 22 (11-04-19 @ 15:00) (17 - 34)  SpO2: 95% (11-04-19 @ 15:00) (94% - 97%)    PHYSICAL EXAMINATION:   Constitutional: WD, NAD  HEENT: NC, AT  Neck:  Supple  Respiratory:  Adequate airflow b/l. Not using accessory muscles of respiration.  Cardiovascular: sys murmur. S1 & S2 intact, no R/G, 2+ radial pulses b/l  Gastrointestinal: Soft, NT, ND, normoactive b.s., no organomegaly/RT/rigidity  Extremities: WWP  Neurological:  Alert and awake.  No acute focal motor deficits. Crude sensation intact.     Labs and imaging reviewed    LABS:                        11.5   6.54  )-----------( 338      ( 04 Nov 2019 05:14 )             36.2     11-04    135  |  96  |  13  ----------------------------<  119<H>  4.2   |  30  |  0.65    Ca    9.0      04 Nov 2019 05:14  Phos  3.0     11-04  Mg     2.2     11-04    TPro  6.5  /  Alb  2.8<L>  /  TBili  0.3  /  DBili  x   /  AST  29  /  ALT  34  /  AlkPhos  82  11-04            CAPILLARY BLOOD GLUCOSE            LIVER FUNCTIONS - ( 04 Nov 2019 05:14 )  Alb: 2.8 g/dL / Pro: 6.5 g/dL / ALK PHOS: 82 U/L / ALT: 34 U/L / AST: 29 U/L / GGT: x               RADIOLOGY & ADDITIONAL STUDIES:

## 2019-11-04 NOTE — PROGRESS NOTE ADULT - ASSESSMENT
89 y/o Female with PMHx of arthritis, hypothyroidism, presenting with acute pericardial effusion concerning for tamponade.

## 2019-11-06 PROBLEM — E03.9 HYPOTHYROIDISM, UNSPECIFIED: Chronic | Status: ACTIVE | Noted: 2019-10-30

## 2019-11-11 ENCOUNTER — APPOINTMENT (OUTPATIENT)
Dept: CARDIOLOGY | Facility: CLINIC | Age: 84
End: 2019-11-11

## 2019-11-19 ENCOUNTER — NON-APPOINTMENT (OUTPATIENT)
Age: 84
End: 2019-11-19

## 2019-11-19 ENCOUNTER — APPOINTMENT (OUTPATIENT)
Dept: CARDIOLOGY | Facility: CLINIC | Age: 84
End: 2019-11-19
Payer: MEDICARE

## 2019-11-19 VITALS
DIASTOLIC BLOOD PRESSURE: 46 MMHG | BODY MASS INDEX: 23.92 KG/M2 | HEIGHT: 62 IN | SYSTOLIC BLOOD PRESSURE: 122 MMHG | WEIGHT: 130 LBS

## 2019-11-19 VITALS
HEART RATE: 75 BPM | DIASTOLIC BLOOD PRESSURE: 60 MMHG | SYSTOLIC BLOOD PRESSURE: 116 MMHG | OXYGEN SATURATION: 98 % | RESPIRATION RATE: 14 BRPM | HEIGHT: 62 IN | WEIGHT: 130 LBS | BODY MASS INDEX: 23.92 KG/M2

## 2019-11-19 DIAGNOSIS — R55 SYNCOPE AND COLLAPSE: ICD-10-CM

## 2019-11-19 DIAGNOSIS — Z86.39 PERSONAL HISTORY OF OTHER ENDOCRINE, NUTRITIONAL AND METABOLIC DISEASE: ICD-10-CM

## 2019-11-19 DIAGNOSIS — Z82.49 FAMILY HISTORY OF ISCHEMIC HEART DISEASE AND OTHER DISEASES OF THE CIRCULATORY SYSTEM: ICD-10-CM

## 2019-11-19 DIAGNOSIS — I31.3 PERICARDIAL EFFUSION (NONINFLAMMATORY): ICD-10-CM

## 2019-11-19 PROCEDURE — 93306 TTE W/DOPPLER COMPLETE: CPT

## 2019-11-19 PROCEDURE — 93000 ELECTROCARDIOGRAM COMPLETE: CPT

## 2019-11-19 PROCEDURE — 99215 OFFICE O/P EST HI 40 MIN: CPT

## 2019-11-19 RX ORDER — DOCUSATE SODIUM 100 MG/1
CAPSULE ORAL
Refills: 0 | Status: ACTIVE | COMMUNITY

## 2019-11-19 RX ORDER — UBIDECARENONE/VIT E ACET 100MG-5
CAPSULE ORAL
Refills: 0 | Status: ACTIVE | COMMUNITY

## 2019-11-19 RX ORDER — ASPIRIN 81 MG/1
81 TABLET ORAL
Refills: 0 | Status: ACTIVE | COMMUNITY

## 2019-11-19 RX ORDER — LEVOTHYROXINE SODIUM 0.03 MG/1
25 TABLET ORAL
Qty: 90 | Refills: 1 | Status: ACTIVE | COMMUNITY

## 2019-11-19 RX ORDER — BUSPIRONE HYDROCHLORIDE 5 MG/1
5 TABLET ORAL DAILY
Refills: 0 | Status: ACTIVE | COMMUNITY

## 2019-11-19 RX ORDER — SENNOSIDES 8.6 MG TABLETS 8.6 MG/1
TABLET ORAL
Refills: 0 | Status: ACTIVE | COMMUNITY

## 2019-11-20 PROBLEM — R55 SYNCOPE AND COLLAPSE: Status: RESOLVED | Noted: 2019-11-20 | Resolved: 2019-11-20

## 2019-11-20 NOTE — PHYSICAL EXAM
[General Appearance - Well Developed] : well developed [Normal Appearance] : normal appearance [Well Groomed] : well groomed [General Appearance - Well Nourished] : well nourished [General Appearance - In No Acute Distress] : no acute distress [Normal Conjunctiva] : the conjunctiva exhibited no abnormalities [Eyelids - No Xanthelasma] : the eyelids demonstrated no xanthelasmas [Normal Jugular Venous A Waves Present] : normal jugular venous A waves present [Normal Jugular Venous V Waves Present] : normal jugular venous V waves present [No Jugular Venous Guerra A Waves] : no jugular venous guerra A waves [Respiration, Rhythm And Depth] : normal respiratory rhythm and effort [Auscultation Breath Sounds / Voice Sounds] : lungs were clear to auscultation bilaterally [Heart Rate And Rhythm] : heart rate and rhythm were normal [Bowel Sounds] : normal bowel sounds [Abnormal Walk] : normal gait [Nail Clubbing] : no clubbing of the fingernails [Cyanosis, Localized] : no localized cyanosis [Skin Color & Pigmentation] : normal skin color and pigmentation [] : no rash [Oriented To Time, Place, And Person] : oriented to person, place, and time [Impaired Insight] : insight and judgment were intact [Affect] : the affect was normal [Mood] : the mood was normal [FreeTextEntry1] : PMI not displaced.  Normal S1 and S2.  No murmur, rub or gallop appreciated.

## 2019-11-20 NOTE — ASSESSMENT
[FreeTextEntry1] : Exertional dyspnea\par \par Pleuropericarditis\par \par Moderate to large pericardial effusion on presentation, with early [place] nod; improved with conservative management.\par \par Paroxysmal atrial fibrillation in the setting of active pleuropericarditis with pericardial and pleural effusions.\par \par Subclinical CAD\par \par L carotid a. stenosis.

## 2019-11-20 NOTE — REASON FOR VISIT
[FreeTextEntry1] : \par Kavita Trace presents today regarding a pericardial effusion and paroxysmal atrial fibrillation.

## 2019-11-20 NOTE — DISCUSSION/SUMMARY
[FreeTextEntry1] : \par Pleuropericarditis, with moderate to large pericardial effusion.  TTE today shows only small effusion; continues to improve with conservative therapy.  \par \par Paroxysmal atrial fibrillation in the setting of active pleuropericarditis with pericardial and pleural effusions; no apparent recurrence.  Will continue metoprolol.\par Will continue ASA; patient and daughter reluctant to consider anticoagulation, as the patient has a history of falls in the past.  As the episode was clearly provoked by the pericardial effusion, which is now resolving, I do not think that long-term anticoagulation is mandatory in this situation.  Low-dose aspirin seems a reasonable choice.\par \par Subclinical CAD -asymptomatic.  Will continue on aspirin and metoprolol.\par \par L carotid a. stenosis. -Continue aspirin.\par \par She will return in 2 months; we will arrange for follow-up echocardiogram at that time, to make sure that the residual pericardial and pleural effusions have resolved.

## 2020-01-21 ENCOUNTER — APPOINTMENT (OUTPATIENT)
Dept: CARDIOLOGY | Facility: CLINIC | Age: 85
End: 2020-01-21
Payer: MEDICARE

## 2020-01-21 ENCOUNTER — NON-APPOINTMENT (OUTPATIENT)
Age: 85
End: 2020-01-21

## 2020-01-21 VITALS
HEART RATE: 72 BPM | DIASTOLIC BLOOD PRESSURE: 60 MMHG | SYSTOLIC BLOOD PRESSURE: 120 MMHG | BODY MASS INDEX: 23.55 KG/M2 | HEIGHT: 62 IN | WEIGHT: 128 LBS

## 2020-01-21 DIAGNOSIS — I65.22 OCCLUSION AND STENOSIS OF LEFT CAROTID ARTERY: ICD-10-CM

## 2020-01-21 PROCEDURE — 99214 OFFICE O/P EST MOD 30 MIN: CPT

## 2020-01-21 PROCEDURE — 93000 ELECTROCARDIOGRAM COMPLETE: CPT

## 2020-01-21 RX ORDER — FUROSEMIDE 20 MG/1
20 TABLET ORAL
Qty: 90 | Refills: 1 | Status: ACTIVE | COMMUNITY
Start: 1900-01-01 | End: 1900-01-01

## 2020-01-21 RX ORDER — METOPROLOL TARTRATE 25 MG/1
25 TABLET, FILM COATED ORAL
Qty: 90 | Refills: 3 | Status: ACTIVE | COMMUNITY

## 2020-01-21 NOTE — PHYSICAL EXAM
[Normal Appearance] : normal appearance [General Appearance - Well Developed] : well developed [Well Groomed] : well groomed [General Appearance - Well Nourished] : well nourished [Normal Conjunctiva] : the conjunctiva exhibited no abnormalities [General Appearance - In No Acute Distress] : no acute distress [Eyelids - No Xanthelasma] : the eyelids demonstrated no xanthelasmas [Normal Jugular Venous A Waves Present] : normal jugular venous A waves present [Normal Jugular Venous V Waves Present] : normal jugular venous V waves present [No Jugular Venous Guerra A Waves] : no jugular venous guerra A waves [Auscultation Breath Sounds / Voice Sounds] : lungs were clear to auscultation bilaterally [Respiration, Rhythm And Depth] : normal respiratory rhythm and effort [Heart Rate And Rhythm] : heart rate and rhythm were normal [Bowel Sounds] : normal bowel sounds [Abnormal Walk] : normal gait [Nail Clubbing] : no clubbing of the fingernails [Cyanosis, Localized] : no localized cyanosis [Skin Color & Pigmentation] : normal skin color and pigmentation [] : no rash [Oriented To Time, Place, And Person] : oriented to person, place, and time [Impaired Insight] : insight and judgment were intact [Affect] : the affect was normal [Mood] : the mood was normal [FreeTextEntry1] : No bruit.  Soft, non-tender.  Liver and spleen not palpable; aortic pulsation not palpable.

## 2020-01-21 NOTE — HISTORY OF PRESENT ILLNESS
[FreeTextEntry1] : She was hospitalized at [facility] in October 30, presenting with progressing STOCK over a week. Admission chest x-ray was c/w congestive heart failure.  Admitting EKG showed sinus rhythm, however, she then developed atrial fibrillation with a RVR.  Echo showed a moderate to large pericardial effusion with early tamponade physiology.  At that point, she was moved to the CCU observation.\par \par She was sent to interventional radiology, with the plan to drain the effusion, however, on CT scan, the effusion appeared smaller in size; it was concluded that drainage was not necessary.  She had reverted to normal rhythm and was treated with metoprolol to help maintain sinus rhythm.\par \par Echo also showed mild mitral valve insufficiency & preserved LV systolic function with EF of 68%.  CT scan of the chest demonstrated coronary artery and aortic calcifications.\par \par At the time of discharge, she remained in sinus rhythm.  As A. fib had resolved and she has a history of falls, she was d/c'd on low-dose aspirin rather than an anticoagulant.  She was also to continue on Lasix and metoprolol.\par \par As she returns today, she comes in the company of her son and  (both name Angel).  She has been getting stronger since I saw her last.  She is independent at home and able to climb the stairs.  She reports no resting breathlessness; she has mild STOCK but this has improved.  She describes no chest discomfort or palpitations.  There have been no episodes of orthopnea or PND.

## 2020-01-21 NOTE — ASSESSMENT
[FreeTextEntry1] : Exertional dyspnea\par \par Pleuropericarditis, resolved;  moderate to large pericardial effusion on presentation with early tamponade; improved with conservative management.\par PAF in the setting of active pleuropericarditis with pericardial and pleural effusions.\par \par Mild MR and PI\par \par Mild diastolic LV dysfunction\par \par Subclinical CAD\par \par L carotid a. stenosis.

## 2020-01-21 NOTE — DISCUSSION/SUMMARY
[FreeTextEntry1] : \par Pleuropericarditis, imiproved with conservative therapy.  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  Will continue metoprolol at current dose.\par \par Continue low dose ASA; patient has a history of falls.  As the episode of PAF was provoked by the pericardial effusion, now resolving, I do not think that long-term anticoagulation is mandatory in this situation.  \par \par CAD -asymptomatic.  Will continue on aspirin and metoprolol.\par \par L carotid a. stenosis. -Continue aspirin.\par \par She will return in 2 months; we will arrange for follow-up echocardiogram at that time.

## 2020-01-23 LAB
ALBUMIN SERPL ELPH-MCNC: 3.7 G/DL
ALP BLD-CCNC: 75 U/L
ALT SERPL-CCNC: 6 U/L
ANION GAP SERPL CALC-SCNC: 11 MMOL/L
AST SERPL-CCNC: 13 U/L
BASOPHILS # BLD AUTO: 0.05 K/UL
BASOPHILS NFR BLD AUTO: 0.8 %
BILIRUB SERPL-MCNC: 0.4 MG/DL
BUN SERPL-MCNC: 14 MG/DL
CALCIUM SERPL-MCNC: 9.3 MG/DL
CHLORIDE SERPL-SCNC: 100 MMOL/L
CO2 SERPL-SCNC: 28 MMOL/L
CREAT SERPL-MCNC: 0.85 MG/DL
EOSINOPHIL # BLD AUTO: 0.33 K/UL
EOSINOPHIL NFR BLD AUTO: 5.3 %
GLUCOSE SERPL-MCNC: 111 MG/DL
HCT VFR BLD CALC: 39.5 %
HGB BLD-MCNC: 12.2 G/DL
IMM GRANULOCYTES NFR BLD AUTO: 0.2 %
LYMPHOCYTES # BLD AUTO: 2.42 K/UL
LYMPHOCYTES NFR BLD AUTO: 39.1 %
MAN DIFF?: NORMAL
MCHC RBC-ENTMCNC: 28.8 PG
MCHC RBC-ENTMCNC: 30.9 GM/DL
MCV RBC AUTO: 93.4 FL
MONOCYTES # BLD AUTO: 0.5 K/UL
MONOCYTES NFR BLD AUTO: 8.1 %
NEUTROPHILS # BLD AUTO: 2.88 K/UL
NEUTROPHILS NFR BLD AUTO: 46.5 %
PLATELET # BLD AUTO: 251 K/UL
POTASSIUM SERPL-SCNC: 4.5 MMOL/L
PROT SERPL-MCNC: 6.8 G/DL
RBC # BLD: 4.23 M/UL
RBC # FLD: 15.8 %
SODIUM SERPL-SCNC: 139 MMOL/L
TSH SERPL-ACNC: 5.14 UIU/ML
WBC # FLD AUTO: 6.19 K/UL

## 2020-03-16 ENCOUNTER — APPOINTMENT (OUTPATIENT)
Dept: CARDIOLOGY | Facility: CLINIC | Age: 85
End: 2020-03-16

## 2020-03-16 DIAGNOSIS — I31.3 PERICARDIAL EFFUSION (NONINFLAMMATORY): ICD-10-CM

## 2020-03-16 NOTE — DISCUSSION/SUMMARY
[FreeTextEntry1] : PRELIMINARY NOTE\par \par \par \par Pleuropericarditis, imiproved with conservative therapy.  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  Will continue metoprolol at current dose.\par \par Continue low dose ASA; patient has a history of falls.  As the episode of PAF was provoked by the pericardial effusion, now resolving, I do not think that long-term anticoagulation is mandatory in this situation.  \par \par CAD -asymptomatic.  Will continue on aspirin and metoprolol.\par \par L carotid a. stenosis. -Continue aspirin.\par \par She will return in 2 months; we will arrange for follow-up echocardiogram at that time.

## 2020-03-16 NOTE — HISTORY OF PRESENT ILLNESS
[FreeTextEntry1] : In October, presented with progressing STOCK; chest x-ray was c/w CHF.  Admitting EKG showed sinus rhythm, however, she developed AF with a RVR.  Echo showed a moderate to large pericardial effusion with early tamponade physiology.  She was sent to interventional radiology, with the plan to drain the effusion, however, on CT scan, the effusion appeared smaller in size; it was concluded that drainage was not necessary.  She had reverted to normal rhythm and was treated with metoprolol to help maintain sinus rhythm.\par \par Echo also showed mild MR & preserved LV systolic function with EF of 68%.  CT scan of the chest demonstrated coronary artery and aortic calcifications.\par \par As A. fib had resolved and she has a history of falls, she was d/c'd on low-dose aspirin rather than an anticoagulant.  She was also to continue on Lasix and metoprolol.\par \par As she returns today, \par \par she comes in the company of her son and  (both name Angel).  She has been getting stronger since I saw her last.  She is independent at home and able to climb the stairs.  She reports no resting breathlessness; she has mild STOCK but this has improved.  She describes no chest discomfort or palpitations.  There have been no episodes of orthopnea or PND.

## 2020-03-16 NOTE — PHYSICAL EXAM
[General Appearance - Well Developed] : well developed [Normal Appearance] : normal appearance [Well Groomed] : well groomed [General Appearance - Well Nourished] : well nourished [General Appearance - In No Acute Distress] : no acute distress [Normal Conjunctiva] : the conjunctiva exhibited no abnormalities [Eyelids - No Xanthelasma] : the eyelids demonstrated no xanthelasmas [Normal Jugular Venous A Waves Present] : normal jugular venous A waves present [Normal Jugular Venous V Waves Present] : normal jugular venous V waves present [No Jugular Venous Guerra A Waves] : no jugular venous guerra A waves [Respiration, Rhythm And Depth] : normal respiratory rhythm and effort [Auscultation Breath Sounds / Voice Sounds] : lungs were clear to auscultation bilaterally [Heart Rate And Rhythm] : heart rate and rhythm were normal [Bowel Sounds] : normal bowel sounds [Abnormal Walk] : normal gait [Nail Clubbing] : no clubbing of the fingernails [Cyanosis, Localized] : no localized cyanosis [Skin Color & Pigmentation] : normal skin color and pigmentation [] : no rash [Oriented To Time, Place, And Person] : oriented to person, place, and time [Impaired Insight] : insight and judgment were intact [Affect] : the affect was normal [Mood] : the mood was normal [FreeTextEntry1] : No bruit.  Soft, non-tender.  Liver and spleen not palpable; aortic pulsation not palpable.

## 2020-03-16 NOTE — REASON FOR VISIT
[FreeTextEntry1] : PATIENT CANCELLED APPT.\par \par \par PRELIMINARY NOTE\par \par \par \par Kavita Trace presents today regarding a pericardial effusion and paroxysmal atrial fibrillation.

## 2020-11-14 ENCOUNTER — TRANSCRIPTION ENCOUNTER (OUTPATIENT)
Age: 85
End: 2020-11-14

## 2020-11-15 ENCOUNTER — RESULT REVIEW (OUTPATIENT)
Age: 85
End: 2020-11-15

## 2020-11-15 ENCOUNTER — INPATIENT (INPATIENT)
Facility: HOSPITAL | Age: 85
LOS: 2 days | Discharge: NURSING, REHAB OR LONG-TERM CARE | DRG: 522 | End: 2020-11-18
Attending: ORTHOPAEDIC SURGERY | Admitting: ORTHOPAEDIC SURGERY
Payer: MEDICARE

## 2020-11-15 VITALS
RESPIRATION RATE: 17 BRPM | DIASTOLIC BLOOD PRESSURE: 72 MMHG | WEIGHT: 175.05 LBS | SYSTOLIC BLOOD PRESSURE: 109 MMHG | HEIGHT: 63 IN | OXYGEN SATURATION: 96 % | TEMPERATURE: 98 F | HEART RATE: 49 BPM

## 2020-11-15 DIAGNOSIS — R52 PAIN, UNSPECIFIED: ICD-10-CM

## 2020-11-15 DIAGNOSIS — S72.009A FRACTURE OF UNSPECIFIED PART OF NECK OF UNSPECIFIED FEMUR, INITIAL ENCOUNTER FOR CLOSED FRACTURE: ICD-10-CM

## 2020-11-15 DIAGNOSIS — I48.91 UNSPECIFIED ATRIAL FIBRILLATION: ICD-10-CM

## 2020-11-15 DIAGNOSIS — S72.001A FRACTURE OF UNSPECIFIED PART OF NECK OF RIGHT FEMUR, INITIAL ENCOUNTER FOR CLOSED FRACTURE: ICD-10-CM

## 2020-11-15 LAB
ALBUMIN SERPL ELPH-MCNC: 4 G/DL — SIGNIFICANT CHANGE UP (ref 3.3–5)
ALP SERPL-CCNC: 92 U/L — SIGNIFICANT CHANGE UP (ref 40–120)
ALT FLD-CCNC: 12 U/L — SIGNIFICANT CHANGE UP (ref 10–45)
ANION GAP SERPL CALC-SCNC: 10 MMOL/L — SIGNIFICANT CHANGE UP (ref 5–17)
ANION GAP SERPL CALC-SCNC: 19 MMOL/L — HIGH (ref 5–17)
APTT BLD: 31.4 SEC — SIGNIFICANT CHANGE UP (ref 27.5–35.5)
AST SERPL-CCNC: 18 U/L — SIGNIFICANT CHANGE UP (ref 10–40)
BASOPHILS # BLD AUTO: 0.03 K/UL — SIGNIFICANT CHANGE UP (ref 0–0.2)
BASOPHILS NFR BLD AUTO: 0.3 % — SIGNIFICANT CHANGE UP (ref 0–2)
BILIRUB SERPL-MCNC: 0.4 MG/DL — SIGNIFICANT CHANGE UP (ref 0.2–1.2)
BLD GP AB SCN SERPL QL: NEGATIVE — SIGNIFICANT CHANGE UP
BUN SERPL-MCNC: 18 MG/DL — SIGNIFICANT CHANGE UP (ref 7–23)
BUN SERPL-MCNC: 18 MG/DL — SIGNIFICANT CHANGE UP (ref 7–23)
CALCIUM SERPL-MCNC: 8.7 MG/DL — SIGNIFICANT CHANGE UP (ref 8.4–10.5)
CALCIUM SERPL-MCNC: 9.3 MG/DL — SIGNIFICANT CHANGE UP (ref 8.4–10.5)
CHLORIDE SERPL-SCNC: 102 MMOL/L — SIGNIFICANT CHANGE UP (ref 96–108)
CHLORIDE SERPL-SCNC: 103 MMOL/L — SIGNIFICANT CHANGE UP (ref 96–108)
CO2 SERPL-SCNC: 15 MMOL/L — LOW (ref 22–31)
CO2 SERPL-SCNC: 27 MMOL/L — SIGNIFICANT CHANGE UP (ref 22–31)
CREAT SERPL-MCNC: 0.82 MG/DL — SIGNIFICANT CHANGE UP (ref 0.5–1.3)
CREAT SERPL-MCNC: 0.99 MG/DL — SIGNIFICANT CHANGE UP (ref 0.5–1.3)
EOSINOPHIL # BLD AUTO: 0.21 K/UL — SIGNIFICANT CHANGE UP (ref 0–0.5)
EOSINOPHIL NFR BLD AUTO: 2.4 % — SIGNIFICANT CHANGE UP (ref 0–6)
GLUCOSE SERPL-MCNC: 107 MG/DL — HIGH (ref 70–99)
GLUCOSE SERPL-MCNC: 134 MG/DL — HIGH (ref 70–99)
HCT VFR BLD CALC: 41.9 % — SIGNIFICANT CHANGE UP (ref 34.5–45)
HCT VFR BLD CALC: 42.4 % — SIGNIFICANT CHANGE UP (ref 34.5–45)
HGB BLD-MCNC: 13.3 G/DL — SIGNIFICANT CHANGE UP (ref 11.5–15.5)
HGB BLD-MCNC: 13.3 G/DL — SIGNIFICANT CHANGE UP (ref 11.5–15.5)
IMM GRANULOCYTES NFR BLD AUTO: 0.7 % — SIGNIFICANT CHANGE UP (ref 0–1.5)
INR BLD: 1.04 RATIO — SIGNIFICANT CHANGE UP (ref 0.88–1.16)
LYMPHOCYTES # BLD AUTO: 2.19 K/UL — SIGNIFICANT CHANGE UP (ref 1–3.3)
LYMPHOCYTES # BLD AUTO: 24.6 % — SIGNIFICANT CHANGE UP (ref 13–44)
MCHC RBC-ENTMCNC: 30.5 PG — SIGNIFICANT CHANGE UP (ref 27–34)
MCHC RBC-ENTMCNC: 30.9 PG — SIGNIFICANT CHANGE UP (ref 27–34)
MCHC RBC-ENTMCNC: 31.4 GM/DL — LOW (ref 32–36)
MCHC RBC-ENTMCNC: 31.7 GM/DL — LOW (ref 32–36)
MCV RBC AUTO: 97.2 FL — SIGNIFICANT CHANGE UP (ref 80–100)
MCV RBC AUTO: 97.4 FL — SIGNIFICANT CHANGE UP (ref 80–100)
MONOCYTES # BLD AUTO: 0.57 K/UL — SIGNIFICANT CHANGE UP (ref 0–0.9)
MONOCYTES NFR BLD AUTO: 6.4 % — SIGNIFICANT CHANGE UP (ref 2–14)
NEUTROPHILS # BLD AUTO: 5.85 K/UL — SIGNIFICANT CHANGE UP (ref 1.8–7.4)
NEUTROPHILS NFR BLD AUTO: 65.6 % — SIGNIFICANT CHANGE UP (ref 43–77)
NRBC # BLD: 0 /100 WBCS — SIGNIFICANT CHANGE UP (ref 0–0)
NRBC # BLD: 0 /100 WBCS — SIGNIFICANT CHANGE UP (ref 0–0)
PLATELET # BLD AUTO: 157 K/UL — SIGNIFICANT CHANGE UP (ref 150–400)
PLATELET # BLD AUTO: 215 K/UL — SIGNIFICANT CHANGE UP (ref 150–400)
POTASSIUM SERPL-MCNC: 4.2 MMOL/L — SIGNIFICANT CHANGE UP (ref 3.5–5.3)
POTASSIUM SERPL-MCNC: 4.7 MMOL/L — SIGNIFICANT CHANGE UP (ref 3.5–5.3)
POTASSIUM SERPL-SCNC: 4.2 MMOL/L — SIGNIFICANT CHANGE UP (ref 3.5–5.3)
POTASSIUM SERPL-SCNC: 4.7 MMOL/L — SIGNIFICANT CHANGE UP (ref 3.5–5.3)
PROT SERPL-MCNC: 7.2 G/DL — SIGNIFICANT CHANGE UP (ref 6–8.3)
PROTHROM AB SERPL-ACNC: 12.5 SEC — SIGNIFICANT CHANGE UP (ref 10.6–13.6)
RBC # BLD: 4.3 M/UL — SIGNIFICANT CHANGE UP (ref 3.8–5.2)
RBC # BLD: 4.36 M/UL — SIGNIFICANT CHANGE UP (ref 3.8–5.2)
RBC # FLD: 13.1 % — SIGNIFICANT CHANGE UP (ref 10.3–14.5)
RBC # FLD: 13.1 % — SIGNIFICANT CHANGE UP (ref 10.3–14.5)
RH IG SCN BLD-IMP: POSITIVE — SIGNIFICANT CHANGE UP
SARS-COV-2 RNA SPEC QL NAA+PROBE: SIGNIFICANT CHANGE UP
SARS-COV-2 RNA SPEC QL NAA+PROBE: SIGNIFICANT CHANGE UP
SODIUM SERPL-SCNC: 137 MMOL/L — SIGNIFICANT CHANGE UP (ref 135–145)
SODIUM SERPL-SCNC: 139 MMOL/L — SIGNIFICANT CHANGE UP (ref 135–145)
WBC # BLD: 12.06 K/UL — HIGH (ref 3.8–10.5)
WBC # BLD: 8.91 K/UL — SIGNIFICANT CHANGE UP (ref 3.8–10.5)
WBC # FLD AUTO: 12.06 K/UL — HIGH (ref 3.8–10.5)
WBC # FLD AUTO: 8.91 K/UL — SIGNIFICANT CHANGE UP (ref 3.8–10.5)

## 2020-11-15 PROCEDURE — 73502 X-RAY EXAM HIP UNI 2-3 VIEWS: CPT | Mod: 26,RT

## 2020-11-15 PROCEDURE — 71045 X-RAY EXAM CHEST 1 VIEW: CPT | Mod: 26

## 2020-11-15 PROCEDURE — 73552 X-RAY EXAM OF FEMUR 2/>: CPT | Mod: 26,RT

## 2020-11-15 PROCEDURE — 88311 DECALCIFY TISSUE: CPT | Mod: 26

## 2020-11-15 PROCEDURE — 70450 CT HEAD/BRAIN W/O DYE: CPT | Mod: 26

## 2020-11-15 PROCEDURE — 88305 TISSUE EXAM BY PATHOLOGIST: CPT | Mod: 26

## 2020-11-15 PROCEDURE — 93010 ELECTROCARDIOGRAM REPORT: CPT

## 2020-11-15 PROCEDURE — 99285 EMERGENCY DEPT VISIT HI MDM: CPT

## 2020-11-15 PROCEDURE — 72170 X-RAY EXAM OF PELVIS: CPT | Mod: 26,59

## 2020-11-15 RX ORDER — POLYETHYLENE GLYCOL 3350 17 G/17G
17 POWDER, FOR SOLUTION ORAL DAILY
Refills: 0 | Status: DISCONTINUED | OUTPATIENT
Start: 2020-11-15 | End: 2020-11-18

## 2020-11-15 RX ORDER — OXYCODONE HYDROCHLORIDE 5 MG/1
5 TABLET ORAL EVERY 4 HOURS
Refills: 0 | Status: DISCONTINUED | OUTPATIENT
Start: 2020-11-15 | End: 2020-11-18

## 2020-11-15 RX ORDER — FUROSEMIDE 40 MG
20 TABLET ORAL DAILY
Refills: 0 | Status: DISCONTINUED | OUTPATIENT
Start: 2020-11-15 | End: 2020-11-18

## 2020-11-15 RX ORDER — MORPHINE SULFATE 50 MG/1
4 CAPSULE, EXTENDED RELEASE ORAL ONCE
Refills: 0 | Status: DISCONTINUED | OUTPATIENT
Start: 2020-11-15 | End: 2020-11-15

## 2020-11-15 RX ORDER — PANTOPRAZOLE SODIUM 20 MG/1
40 TABLET, DELAYED RELEASE ORAL
Refills: 0 | Status: DISCONTINUED | OUTPATIENT
Start: 2020-11-15 | End: 2020-11-18

## 2020-11-15 RX ORDER — ACETAMINOPHEN 500 MG
975 TABLET ORAL EVERY 8 HOURS
Refills: 0 | Status: DISCONTINUED | OUTPATIENT
Start: 2020-11-15 | End: 2020-11-18

## 2020-11-15 RX ORDER — METOPROLOL TARTRATE 50 MG
25 TABLET ORAL
Refills: 0 | Status: DISCONTINUED | OUTPATIENT
Start: 2020-11-15 | End: 2020-11-18

## 2020-11-15 RX ORDER — ONDANSETRON 8 MG/1
4 TABLET, FILM COATED ORAL ONCE
Refills: 0 | Status: DISCONTINUED | OUTPATIENT
Start: 2020-11-15 | End: 2020-11-15

## 2020-11-15 RX ORDER — HYDROMORPHONE HYDROCHLORIDE 2 MG/ML
0.5 INJECTION INTRAMUSCULAR; INTRAVENOUS; SUBCUTANEOUS
Refills: 0 | Status: DISCONTINUED | OUTPATIENT
Start: 2020-11-15 | End: 2020-11-15

## 2020-11-15 RX ORDER — CHOLECALCIFEROL (VITAMIN D3) 125 MCG
1000 CAPSULE ORAL DAILY
Refills: 0 | Status: DISCONTINUED | OUTPATIENT
Start: 2020-11-15 | End: 2020-11-18

## 2020-11-15 RX ORDER — SODIUM CHLORIDE 9 MG/ML
1000 INJECTION INTRAMUSCULAR; INTRAVENOUS; SUBCUTANEOUS ONCE
Refills: 0 | Status: COMPLETED | OUTPATIENT
Start: 2020-11-15 | End: 2020-11-15

## 2020-11-15 RX ORDER — OXYCODONE HYDROCHLORIDE 5 MG/1
2.5 TABLET ORAL EVERY 4 HOURS
Refills: 0 | Status: DISCONTINUED | OUTPATIENT
Start: 2020-11-15 | End: 2020-11-18

## 2020-11-15 RX ORDER — ONDANSETRON 8 MG/1
4 TABLET, FILM COATED ORAL ONCE
Refills: 0 | Status: COMPLETED | OUTPATIENT
Start: 2020-11-15 | End: 2020-11-15

## 2020-11-15 RX ORDER — ACETAMINOPHEN 500 MG
975 TABLET ORAL ONCE
Refills: 0 | Status: COMPLETED | OUTPATIENT
Start: 2020-11-15 | End: 2020-11-15

## 2020-11-15 RX ORDER — MORPHINE SULFATE 50 MG/1
2 CAPSULE, EXTENDED RELEASE ORAL ONCE
Refills: 0 | Status: DISCONTINUED | OUTPATIENT
Start: 2020-11-15 | End: 2020-11-15

## 2020-11-15 RX ORDER — SODIUM CHLORIDE 9 MG/ML
1000 INJECTION, SOLUTION INTRAVENOUS
Refills: 0 | Status: DISCONTINUED | OUTPATIENT
Start: 2020-11-15 | End: 2020-11-15

## 2020-11-15 RX ORDER — CEFAZOLIN SODIUM 1 G
2000 VIAL (EA) INJECTION EVERY 8 HOURS
Refills: 0 | Status: COMPLETED | OUTPATIENT
Start: 2020-11-15 | End: 2020-11-16

## 2020-11-15 RX ORDER — LEVOTHYROXINE SODIUM 125 MCG
25 TABLET ORAL DAILY
Refills: 0 | Status: DISCONTINUED | OUTPATIENT
Start: 2020-11-15 | End: 2020-11-18

## 2020-11-15 RX ORDER — RIVAROXABAN 15 MG-20MG
10 KIT ORAL DAILY
Refills: 0 | Status: DISCONTINUED | OUTPATIENT
Start: 2020-11-16 | End: 2020-11-18

## 2020-11-15 RX ORDER — MAGNESIUM HYDROXIDE 400 MG/1
30 TABLET, CHEWABLE ORAL DAILY
Refills: 0 | Status: DISCONTINUED | OUTPATIENT
Start: 2020-11-15 | End: 2020-11-18

## 2020-11-15 RX ORDER — SENNA PLUS 8.6 MG/1
2 TABLET ORAL AT BEDTIME
Refills: 0 | Status: DISCONTINUED | OUTPATIENT
Start: 2020-11-15 | End: 2020-11-18

## 2020-11-15 RX ORDER — TRAMADOL HYDROCHLORIDE 50 MG/1
50 TABLET ORAL EVERY 6 HOURS
Refills: 0 | Status: DISCONTINUED | OUTPATIENT
Start: 2020-11-15 | End: 2020-11-18

## 2020-11-15 RX ADMIN — SODIUM CHLORIDE 75 MILLILITER(S): 9 INJECTION, SOLUTION INTRAVENOUS at 17:45

## 2020-11-15 RX ADMIN — MORPHINE SULFATE 2 MILLIGRAM(S): 50 CAPSULE, EXTENDED RELEASE ORAL at 11:05

## 2020-11-15 RX ADMIN — SODIUM CHLORIDE 1000 MILLILITER(S): 9 INJECTION INTRAMUSCULAR; INTRAVENOUS; SUBCUTANEOUS at 13:34

## 2020-11-15 RX ADMIN — Medication 975 MILLIGRAM(S): at 10:50

## 2020-11-15 RX ADMIN — Medication 100 MILLIGRAM(S): at 23:17

## 2020-11-15 RX ADMIN — MORPHINE SULFATE 2 MILLIGRAM(S): 50 CAPSULE, EXTENDED RELEASE ORAL at 10:50

## 2020-11-15 RX ADMIN — POLYETHYLENE GLYCOL 3350 17 GRAM(S): 17 POWDER, FOR SOLUTION ORAL at 21:50

## 2020-11-15 RX ADMIN — SODIUM CHLORIDE 1000 MILLILITER(S): 9 INJECTION INTRAMUSCULAR; INTRAVENOUS; SUBCUTANEOUS at 12:38

## 2020-11-15 RX ADMIN — Medication 975 MILLIGRAM(S): at 22:50

## 2020-11-15 RX ADMIN — Medication 975 MILLIGRAM(S): at 11:50

## 2020-11-15 RX ADMIN — ONDANSETRON 4 MILLIGRAM(S): 8 TABLET, FILM COATED ORAL at 13:30

## 2020-11-15 RX ADMIN — Medication 975 MILLIGRAM(S): at 21:51

## 2020-11-15 RX ADMIN — Medication 25 MILLIGRAM(S): at 20:09

## 2020-11-15 RX ADMIN — MORPHINE SULFATE 4 MILLIGRAM(S): 50 CAPSULE, EXTENDED RELEASE ORAL at 12:14

## 2020-11-15 RX ADMIN — MORPHINE SULFATE 4 MILLIGRAM(S): 50 CAPSULE, EXTENDED RELEASE ORAL at 00:00

## 2020-11-15 NOTE — ED PROVIDER NOTE - NS ED ROS FT
Constitutional: No fevers, no chills  Eyes: No visual changes  ENMT: No sore throat, no congestion  Resp: No shortness of breath, no cough  Cardio: No chest pain, no palpitations, no peripheral edema, no syncope  GI: No abdominal pain, nausea, vomiting, diarrhea  : No dysuria, urinary frequency  MSK: No back pain, no neck pain, + R hip pain  Skin: No rash, no lacerations, no bruising  Neuro: No headache, no numbness, no weakness

## 2020-11-15 NOTE — ED PROVIDER NOTE - ATTENDING CONTRIBUTION TO CARE
fall w r hip pain  r hip ttp  nv intact  xr for fx. ct head. atrial fibrillation not on ac. pre-op labs, pain control.

## 2020-11-15 NOTE — ED PROVIDER NOTE - PROGRESS NOTE DETAILS
pt w still hip pain, I ordered second dose morphine. pt became pale and w hypotension/kurt, remained w ok respirations. ivf 500 cc administered w improvement of color and bp. cause likely related to morphine adverse effect and not bleeding etc. need to be very judicious w the use of opioids w this pt. pt was in atrial fibrillation on arrival but new ekg w nsr w 1st deg block

## 2020-11-15 NOTE — PRE-ANESTHESIA EVALUATION ADULT - NSANTHOSAYNRD_GEN_A_CORE
No. VIVIANA screening performed.  STOP BANG Legend: 0-2 = LOW Risk; 3-4 = INTERMEDIATE Risk; 5-8 = HIGH Risk

## 2020-11-15 NOTE — CHART NOTE - NSCHARTNOTEFT_GEN_A_CORE
Orthopedic Surgery Postop Check    S: Patient underwent R hip hemiarthroplasty, tolerated the procedure without issue, and was sent to PACU in stable condition.  Patient denies chest pain, shortness of breath, nausea, vomiting, lightheadedness, or dizziness.  Pain is well controlled.      O: T(C): 36.1 (11-15-20 @ 19:32), Max: 36.9 (11-15-20 @ 12:20)  HR: 71 (11-15-20 @ 19:32) (52 - 85)  BP: 126/80 (11-15-20 @ 19:32) (53/42 - 170/68)  RR: 18 (11-15-20 @ 19:32) (10 - 18)  SpO2: 95% (11-15-20 @ 19:32) (92% - 100%)  Wt(kg): --                        13.3   12.06 )-----------( 157      ( 15 Nov 2020 17:17 )             41.9        11-15    137  |  103  |  18  ----------------------------<  134<H>  4.7   |  15<L>  |  0.82    Ca    8.7      15 Nov 2020 17:17        Physical exam:  Gen: NAD, resting comfortably in bed  Resp: no increased WOB on RA  RLE:   Dressing c/d/i  TA/GS/EHL/FHL +motor intact  SILT Clarke/Sa/SP/DP/T  Compartments soft, no calf tenderness  WWP distally      Assessment/Plan:  89y Female s/p right hip hemiarthroplasty, POD0.    Pain control  Reg Diet as tolerated, IVFs  Elevate extremity  DVT PPX beginning POD1  PT  Out of bed and encourage early ambulation  Incentive spirometry Orthopedic Surgery Postop Check    S: Patient underwent R hip hemiarthroplasty, tolerated the procedure without issue, and was sent to PACU in stable condition.  Patient denies chest pain, shortness of breath, nausea, vomiting, lightheadedness, or dizziness.  Pain is well controlled.      O: T(C): 36.1 (11-15-20 @ 19:32), Max: 36.9 (11-15-20 @ 12:20)  HR: 71 (11-15-20 @ 19:32) (52 - 85)  BP: 126/80 (11-15-20 @ 19:32) (53/42 - 170/68)  RR: 18 (11-15-20 @ 19:32) (10 - 18)  SpO2: 95% (11-15-20 @ 19:32) (92% - 100%)  Wt(kg): --                        13.3   12.06 )-----------( 157      ( 15 Nov 2020 17:17 )             41.9        11-15    137  |  103  |  18  ----------------------------<  134<H>  4.7   |  15<L>  |  0.82    Ca    8.7      15 Nov 2020 17:17        Physical exam:  Gen: NAD, resting comfortably in bed  Resp: no increased WOB on RA  RLE:   Dressing c/d/i  TA/GS/EHL/FHL +motor intact  SILT Clarke/Sa/SP/DP/T  Compartments soft, no calf tenderness  WWP distally      Assessment/Plan:  89y Female s/p right hip hemiarthroplasty, POD0.    Pain control  Reg Diet as tolerated, IVFs  Elevate extremity  DVT PPX - xarelto  PT/OT  Out of bed and encourage early ambulation  Incentive spirometry

## 2020-11-15 NOTE — CONSULT NOTE ADULT - ASSESSMENT
89 yo woman s/p fall at home this am presents with a right hip fx. Patient is scheduled for a right hemiarthroplasty.

## 2020-11-15 NOTE — ED ADULT NURSE REASSESSMENT NOTE - NS ED NURSE REASSESS COMMENT FT1
Pt states "I don't feel good," appears pale after receiving 4 mg morphine. Pt is lethargic but arousable to verbal stimulation & able to follow commands. BP dropped to 50s/30s & HR dropped to 50s. Defib pads applied with crash cart at bedside. Juan CHENEY & Minna CHENEY aware & at bedside assessing pt. 2nd IV access established with IVF bolus verbally ordered by Juan CHENEY. Pt became more arousable & color returned to face. Pt remains lethargic but more alert. BP improved to 80s/40s & HR increased to 60s, EKG obtained.

## 2020-11-15 NOTE — ED ADULT NURSE REASSESSMENT NOTE - NS ED NURSE REASSESS COMMENT FT1
Per son pt vomiting. RN to bedside to give medication as ordered in EMAR by MD. Pt responding and answering questions. Unlabored, spontaneous respirations, NAD, O2 sat 95% RA. IVF infusing as ordered by MD at this time. Admitted to surgery, awaiting bed placement at this time. Son and pt aware of plan of care.

## 2020-11-15 NOTE — ED PROVIDER NOTE - CLINICAL SUMMARY MEDICAL DECISION MAKING FREE TEXT BOX
89yo female with pmh hypothyroidism, afib, presenting with fall, right hip pain.  Suspicious for R hip fracture.  Will get pre op labs, xrays, pain meds, and reassess after imaging.

## 2020-11-15 NOTE — H&P ADULT - HISTORY OF PRESENT ILLNESS
89y Female [facility] severe R [facility] pain and inability to ambulate.  Patient denies headstrike or LOC. Localizes pain to R hip/femur. Patient denies radiation of pain. Patient denies numbness/tingling/burning in the RLE. No other bone/joint complaints. Patient is a community ambulator at baseline without assistive devices. Patient has no issues w/ ADLs/IADLs.     PAST MEDICAL & SURGICAL HISTORY:  Hypothyroid    No pertinent past medical history    No significant past surgical history      MEDICATIONS  (STANDING):    MEDICATIONS  (PRN):    Allergies    No Known Allergies    Intolerances        T(C): 36.3 (11-15-20 @ 15:43), Max: 36.9 (11-15-20 @ 12:20)  HR: 68 (11-15-20 @ 15:43) (49 - 84)  BP: 109/53 (11-15-20 @ 15:43) (53/42 - 169/75)  RR: 16 (11-15-20 @ 15:43) (10 - 19)  SpO2: 97% (11-15-20 @ 15:43) (96% - 100%)  Wt(kg): --    PE   RLE:  Skin intact; No ecchymosis/soft tissue swelling  Compartments soft; + TTP about hip. No TTP to knee/leg/ankle/foot   ROM lmited 2/2 pain   Unable to SLR; + Log Roll/Heel Strike  Motor intact GS/TA/FHL/EHL  SILT diffusely  WWP distally    LLE/BUE:   No bony TTP; Good ROM w/o pain; Exam Unremarkable    Imaging:  XR demonstrating R femoral neck fracture, displaced    89y Female with R femoral neck fracture     - Pain control  - NPO/IVF  - CBC/BMP/Coags/UA/T+S x2  - EKG/CXR  - Medical clearance  - OR for hemiarthroplasty

## 2020-11-15 NOTE — ED ADULT NURSE REASSESSMENT NOTE - NS ED NURSE REASSESS COMMENT FT1
Ortho MD Ryder made aware of vomiting and hypotension. Will continue to monitor. Awaiting OR per MD.

## 2020-11-15 NOTE — ED PROVIDER NOTE - OBJECTIVE STATEMENT
89yo female with pmh hypothyroidism, afib, presenting with fall, right hip pain.  Patient states she was getting out of bed to eat breakfast when she slipped and landed on her right hip.  Found by son shortly after who called ems.  Does not think she hit her head but is uncertain.  On aspirin.  No chest pain, shortness of breath, palpitations.  Denies numbness, weakness, fevers, headache, neck pain, back pain.

## 2020-11-15 NOTE — CONSULT NOTE ADULT - SUBJECTIVE AND OBJECTIVE BOX
89yo female with pmh hypothyroidism, afib, presenting with fall, right hip pain.  Patient states she was getting out of bed to eat breakfast when she slipped and landed on her right hip.  Found by son shortly after who called ems.  Does not think she hit her head but is uncertain.  On aspirin.  No chest pain, shortness of breath, palpitations.  Denies numbness, weakness, fevers, headache, neck pain, back pain. Patient was brought to [facility] for further evaluation and treatment. Patient was found to have a right hip fx and is scheduled for a right hemiarthroplasty. Patient seen resting complaining of continued hip pain .        PAST MEDICAL & SURGICAL HISTORY:  Hypothyroid    No pertinent past medical history    No significant past surgical history          MEDICATIONS  (STANDING):  morphine  - Injectable 4 milliGRAM(s) IV Push Once    MEDICATIONS  (PRN):    Social Hx:  Tobacco: Neg  ETOH: Neg  Drugs: Neg    Family Hx:  As per my conversation with the patient, non contributory      Ros  CONSTITUTIONAL: No weakness, fevers or chills  EYES/ENT: No visual changes;  No vertigo or throat pain   NECK: No pain or stiffness  RESPIRATORY: No cough, wheezing, hemoptysis; No shortness of breath  CARDIOVASCULAR: No chest pain or palpitations  GASTROINTESTINAL: No abdominal or epigastric pain. No nausea, vomiting, or hematemesis; No diarrhea or constipation. No melena or hematochezia.  GENITOURINARY: No dysuria, frequency or hematuria  NEUROLOGICAL: No numbness or weakness  SKIN: No itching, burning, rashes, or lesions   MUSCULOSKELETAL: right leg pain    INTERVAL HPI/OVERNIGHT EVENTS:  T(C): 36.8 (11-15-20 @ 10:18), Max: 36.8 (11-15-20 @ 10:18)  HR: 84 (11-15-20 @ 10:43) (49 - 84)  BP: 169/75 (11-15-20 @ 10:43) (109/72 - 169/75)  RR: 19 (11-15-20 @ 10:43) (17 - 19)  SpO2: 98% (11-15-20 @ 10:43) (96% - 98%)  Wt(kg): --  I&O's Summary      PHYSICAL EXAM:  GENERAL: NAD, well-groomed, well-developed  HEAD:  Atraumatic, Normocephalic  EYES: EOMI, PERRLA, conjunctiva and sclera clear  ENMT: No tonsillar erythema, exudates, or enlargement; Moist mucous membranes, Good dentition, No lesions  NECK: Supple, No JVD, Normal thyroid  NERVOUS SYSTEM:  Alert & Oriented X3, Good concentration; Motor Strength 5/5 B/L upper and lower extremities; DTRs 2+ intact and symmetric  CHEST/LUNG: Clear to percussion bilaterally; No rales, rhonchi, wheezing, or rubs  HEART: Regular rate and rhythm; No murmurs, rubs, or gallops  ABDOMEN: Soft, Nontender, Nondistended; Bowel sounds present  EXTREMITIES:  2+ Peripheral Pulses, No clubbing, cyanosis, or edema  LYMPH: No lymphadenopathy noted  SKIN: No rashes or lesions        LABS:                        13.3   8.91  )-----------( 215      ( 15 Nov 2020 10:51 )             42.4     11-15    139  |  102  |  18  ----------------------------<  107<H>  4.2   |  27  |  0.99    Ca    9.3      15 Nov 2020 10:51    TPro  7.2  /  Alb  4.0  /  TBili  0.4  /  DBili  x   /  AST  18  /  ALT  12  /  AlkPhos  92  11-15    PT/INR - ( 15 Nov 2020 10:51 )   PT: 12.5 sec;   INR: 1.04 ratio         PTT - ( 15 Nov 2020 10:51 )  PTT:31.4 sec    EKG Afib at 83

## 2020-11-15 NOTE — ED PROVIDER NOTE - PHYSICAL EXAMINATION
General appearance: NAD, conversant, afebrile    Neck: Trachea midline; Full range of motion, supple   Pulm: normal respiratory effort and no intercostal retractions, normal work of breathing   CV: Irregularly irregular, No murmurs, rubs, or gallops   Extremities: No peripheral edema, No gross deformity, RLE shortened, externally rotated, MS 5/5, gross sensation intact, 2+ peripheral pulses   Skin: Dry, normal temperature, turgor and texture; no rash   Psych: Appropriate affect, cooperative; alert and oriented to person, place and time General appearance: NAD, conversant, afebrile    Neck: Trachea midline; Full range of motion, supple   Pulm: normal respiratory effort and no intercostal retractions, normal work of breathing   CV: Irregularly irregular, No murmurs, rubs, or gallops   Extremities: No peripheral edema, No gross deformity, RLE shortened, externally rotated, MS 5/5, gross sensation intact, 2+ peripheral pulses   Skin: Dry, normal temperature, turgor and texture; no rash  Neuro: moving  4/4. fluid speech, altert and oriented x3   Psych: Appropriate affect, cooperative; alert and oriented to person, place and time

## 2020-11-15 NOTE — ED ADULT NURSE REASSESSMENT NOTE - NS ED NURSE REASSESS COMMENT FT1
Report received from Kandice RN, break coverage RN from [id number]. Pt currently at CT scan. Report received from Kandice RN, break coverage RN from [id number]. Pt currently at CT scan.

## 2020-11-15 NOTE — ED ADULT NURSE REASSESSMENT NOTE - NS ED NURSE REASSESS COMMENT FT1
Bedside report received from break coverage RN. MD Martinez at pt bedside. BP 80s/40s. Pt receiving IVF bolus as requested by MD Martinez. Pt placed on O2 for comfort and ETCO2. CM in place NSR HR 60s, repeat EKG completed and given to attending MD. Pt responsive to verbal stimuli. Will continue to monitor.

## 2020-11-15 NOTE — ED ADULT NURSE NOTE - OBJECTIVE STATEMENT
88 y/o female PMH uterine cancer presents to ED reporting R hip pain via EMS. EMS reports pt fell out of bed after getting dressed and was sitting on edge of bed. On exam, AOx3, speaking in complete sentences. No lesions, lacerations or ecchymosis noted to R hip. +2 peripheral pulses, capillary refill less than 2 seconds. Unlabored, spontaneous respirations, NAD, O2 sat 98% RA. Abdomen soft, non-tender, non-distended. Pt denies CP, SOB, n/v/d, fever/chills at this time. Heplock placed, labs sent. MD at bedside to evaluate pt. Awaiting imaging at this time.

## 2020-11-16 LAB
ANION GAP SERPL CALC-SCNC: 12 MMOL/L — SIGNIFICANT CHANGE UP (ref 5–17)
BUN SERPL-MCNC: 18 MG/DL — SIGNIFICANT CHANGE UP (ref 7–23)
CALCIUM SERPL-MCNC: 8.6 MG/DL — SIGNIFICANT CHANGE UP (ref 8.4–10.5)
CHLORIDE SERPL-SCNC: 102 MMOL/L — SIGNIFICANT CHANGE UP (ref 96–108)
CO2 SERPL-SCNC: 22 MMOL/L — SIGNIFICANT CHANGE UP (ref 22–31)
CREAT SERPL-MCNC: 0.77 MG/DL — SIGNIFICANT CHANGE UP (ref 0.5–1.3)
GLUCOSE SERPL-MCNC: 117 MG/DL — HIGH (ref 70–99)
HCT VFR BLD CALC: 35.2 % — SIGNIFICANT CHANGE UP (ref 34.5–45)
HGB BLD-MCNC: 11.4 G/DL — LOW (ref 11.5–15.5)
MCHC RBC-ENTMCNC: 31.1 PG — SIGNIFICANT CHANGE UP (ref 27–34)
MCHC RBC-ENTMCNC: 32.4 GM/DL — SIGNIFICANT CHANGE UP (ref 32–36)
MCV RBC AUTO: 96.2 FL — SIGNIFICANT CHANGE UP (ref 80–100)
NRBC # BLD: 0 /100 WBCS — SIGNIFICANT CHANGE UP (ref 0–0)
PLATELET # BLD AUTO: 179 K/UL — SIGNIFICANT CHANGE UP (ref 150–400)
POTASSIUM SERPL-MCNC: 4.3 MMOL/L — SIGNIFICANT CHANGE UP (ref 3.5–5.3)
POTASSIUM SERPL-SCNC: 4.3 MMOL/L — SIGNIFICANT CHANGE UP (ref 3.5–5.3)
RBC # BLD: 3.66 M/UL — LOW (ref 3.8–5.2)
RBC # FLD: 13.1 % — SIGNIFICANT CHANGE UP (ref 10.3–14.5)
SODIUM SERPL-SCNC: 136 MMOL/L — SIGNIFICANT CHANGE UP (ref 135–145)
WBC # BLD: 11.16 K/UL — HIGH (ref 3.8–10.5)
WBC # FLD AUTO: 11.16 K/UL — HIGH (ref 3.8–10.5)

## 2020-11-16 RX ORDER — SODIUM CHLORIDE 9 MG/ML
1000 INJECTION INTRAMUSCULAR; INTRAVENOUS; SUBCUTANEOUS ONCE
Refills: 0 | Status: COMPLETED | OUTPATIENT
Start: 2020-11-16 | End: 2020-11-16

## 2020-11-16 RX ADMIN — Medication 975 MILLIGRAM(S): at 12:07

## 2020-11-16 RX ADMIN — Medication 25 MILLIGRAM(S): at 18:02

## 2020-11-16 RX ADMIN — SODIUM CHLORIDE 1000 MILLILITER(S): 9 INJECTION INTRAMUSCULAR; INTRAVENOUS; SUBCUTANEOUS at 06:50

## 2020-11-16 RX ADMIN — Medication 25 MILLIGRAM(S): at 05:33

## 2020-11-16 RX ADMIN — Medication 975 MILLIGRAM(S): at 06:06

## 2020-11-16 RX ADMIN — Medication 20 MILLIGRAM(S): at 05:33

## 2020-11-16 RX ADMIN — Medication 975 MILLIGRAM(S): at 21:50

## 2020-11-16 RX ADMIN — POLYETHYLENE GLYCOL 3350 17 GRAM(S): 17 POWDER, FOR SOLUTION ORAL at 12:07

## 2020-11-16 RX ADMIN — Medication 975 MILLIGRAM(S): at 05:33

## 2020-11-16 RX ADMIN — PANTOPRAZOLE SODIUM 40 MILLIGRAM(S): 20 TABLET, DELAYED RELEASE ORAL at 05:33

## 2020-11-16 RX ADMIN — Medication 1000 UNIT(S): at 12:07

## 2020-11-16 RX ADMIN — Medication 975 MILLIGRAM(S): at 21:19

## 2020-11-16 RX ADMIN — Medication 100 MILLIGRAM(S): at 09:07

## 2020-11-16 RX ADMIN — Medication 25 MICROGRAM(S): at 05:33

## 2020-11-16 RX ADMIN — RIVAROXABAN 10 MILLIGRAM(S): KIT at 12:07

## 2020-11-16 NOTE — PROVIDER CONTACT NOTE (OTHER) - ASSESSMENT
pt due to void at 0:00. RN encourages pt to urinate however, pt unable to void. pt denies any discomfort at the moment. bladder scan performed 678 noted. pt repositioned to void and still unable to void. MD Virk made aware. safety maintained.

## 2020-11-16 NOTE — PHYSICAL THERAPY INITIAL EVALUATION ADULT - PERTINENT HX OF CURRENT PROBLEM, REHAB EVAL
Pt is 89y Female [facility] pain and inability to ambulate.  Patient denies headstrike or LOC. Localizes pain to R hip/femur. Pt is now s/p R hip hemiarthroplasty.

## 2020-11-16 NOTE — PROGRESS NOTE ADULT - SUBJECTIVE AND OBJECTIVE BOX
Patient is a 89y old  Female who presents with a chief complaint of Hip fracture  Patient s/p right hip hemiarthroplasty POD#1  Patient appears comfortable, having trouble urinating      T(C): 36.7 (11-16-20 @ 04:14), Max: 36.9 (11-15-20 @ 12:20)  HR: 78 (11-16-20 @ 04:14) (49 - 86)  BP: 111/66 (11-16-20 @ 04:14) (53/42 - 170/68)  RR: 18 (11-16-20 @ 04:14) (10 - 19)  SpO2: 97% (11-16-20 @ 04:14) (92% - 100%)    PHYSICAL EXAM:  NAD, Alert  [Right] Hip: Aquacel  Dressing C/D/I; sensation grossly intact to light touch; (+) DF/PF; (+) Distal Pulses; No Calf tenderness B/L, PAS     LABS:                    13.3   12.06 )-----------( 157      ( 15 Nov 2020 17:17 )             41.9   11-15  137  |  103  |  18  ----------------------------<  134<H>  4.7   |  15<L>  |  0.82  Ca    8.7      15 Nov 2020 17:17  TPro  7.2  /  Alb  4.0  /  TBili  0.4  /  DBili  x   /  AST  18  /  ALT  12  /  AlkPhos  92  11-15  PT/INR - ( 15 Nov 2020 10:51 )   PT: 12.5 sec;   INR: 1.04 ratio     PTT - ( 15 Nov 2020 10:51 )  PTT:31.4 sec

## 2020-11-16 NOTE — PROGRESS NOTE ADULT - ASSESSMENT
89 y/o FM s/p right hip hemiarthroplasty POD#1, physical therapy, NS Bolus for hypotension  Kriss Richardson PA-C  Orthopaedic Surgery  Team pager [phone]  [facility] [phone]  [phone]

## 2020-11-16 NOTE — PHYSICAL THERAPY INITIAL EVALUATION ADULT - ADDITIONAL COMMENTS
PTA pt was independent with functional mobility and ADLs. PTA pt required assist with ADL's and ambulated with RW. Pt owns 3:1 commode and transport chair. Ptl miguel in a PH with her son and , son assists as needed.

## 2020-11-16 NOTE — PROGRESS NOTE ADULT - SUBJECTIVE AND OBJECTIVE BOX
89yo female with pmh hypothyroidism, afib, presenting with fall, right hip pain.  Patient states she was getting out of bed to eat breakfast when she slipped and landed on her right hip.  Found by son shortly after who called ems.  Does not think she hit her head but is uncertain.  On aspirin.  No chest pain, shortness of breath, palpitations.  Denies numbness, weakness, fevers, headache, neck pain, back pain. Patient was brought to [facility] for further evaluation and treatment. Patient was found to have a right hip fx and is s/p  a right hemiarthroplasty. Patient seen resting complaining of continued hip pain .      MEDICATIONS  (STANDING):  acetaminophen   Tablet .. 975 milliGRAM(s) Oral every 8 hours  cholecalciferol 1000 Unit(s) Oral daily  furosemide    Tablet 20 milliGRAM(s) Oral daily  levothyroxine 25 MICROGram(s) Oral daily  metoprolol tartrate 25 milliGRAM(s) Oral two times a day  pantoprazole    Tablet 40 milliGRAM(s) Oral before breakfast  polyethylene glycol 3350 17 Gram(s) Oral daily  rivaroxaban 10 milliGRAM(s) Oral daily    MEDICATIONS  (PRN):  artificial tears (preservative free) Ophthalmic Solution 1 Drop(s) Both EYES every 3 hours PRN Dry Eyes  magnesium hydroxide Suspension 30 milliLiter(s) Oral daily PRN Constipation  oxyCODONE    IR 2.5 milliGRAM(s) Oral every 4 hours PRN Moderate Pain (4 - 6)  oxyCODONE    IR 5 milliGRAM(s) Oral every 4 hours PRN Severe Pain (7 - 10)  senna 2 Tablet(s) Oral at bedtime PRN Constipation  traMADol 50 milliGRAM(s) Oral every 6 hours PRN Mild Pain (1 - 3)          VITALS:   T(C): 36.7 (11-16-20 @ 16:18), Max: 37.1 (11-16-20 @ 08:58)  HR: 74 (11-16-20 @ 16:18) (71 - 89)  BP: 114/60 (11-16-20 @ 16:18) (105/65 - 170/68)  RR: 18 (11-16-20 @ 16:18) (14 - 18)  SpO2: 94% (11-16-20 @ 16:18) (92% - 98%)  Wt(kg): --    PHYSICAL EXAM:  GENERAL: NAD, well-groomed, well-developed  HEAD:  Atraumatic, Normocephalic  EYES: EOMI, PERRLA, conjunctiva and sclera clear  ENMT: No tonsillar erythema, exudates, or enlargement; Moist mucous membranes, Good dentition, No lesions  NECK: Supple, No JVD, Normal thyroid  NERVOUS SYSTEM:  Alert & Oriented X3, Good concentration; Motor Strength 5/5 B/L upper and lower extremities; DTRs 2+ intact and symmetric  CHEST/LUNG: Clear to percussion bilaterally; No rales, rhonchi, wheezing, or rubs  HEART: Regular rate and rhythm; No murmurs, rubs, or gallops  ABDOMEN: Soft, Nontender, Nondistended; Bowel sounds present  EXTREMITIES:  2+ Peripheral Pulses, No clubbing, cyanosis, or edema  LYMPH: No lymphadenopathy noted  SKIN: No rashes or lesions    LABS:        CBC Full  -  ( 16 Nov 2020 07:20 )  WBC Count : 11.16 K/uL  RBC Count : 3.66 M/uL  Hemoglobin : 11.4 g/dL  Hematocrit : 35.2 %  Platelet Count - Automated : 179 K/uL  Mean Cell Volume : 96.2 fl  Mean Cell Hemoglobin : 31.1 pg  Mean Cell Hemoglobin Concentration : 32.4 gm/dL  Auto Neutrophil # : x  Auto Lymphocyte # : x  Auto Monocyte # : x  Auto Eosinophil # : x  Auto Basophil # : x  Auto Neutrophil % : x  Auto Lymphocyte % : x  Auto Monocyte % : x  Auto Eosinophil % : x  Auto Basophil % : x    11-16    136  |  102  |  18  ----------------------------<  117<H>  4.3   |  22  |  0.77    Ca    8.6      16 Nov 2020 07:16    TPro  7.2  /  Alb  4.0  /  TBili  0.4  /  DBili  x   /  AST  18  /  ALT  12  /  AlkPhos  92  11-15    LIVER FUNCTIONS - ( 15 Nov 2020 10:51 )  Alb: 4.0 g/dL / Pro: 7.2 g/dL / ALK PHOS: 92 U/L / ALT: 12 U/L / AST: 18 U/L / GGT: x           PT/INR - ( 15 Nov 2020 10:51 )   PT: 12.5 sec;   INR: 1.04 ratio         PTT - ( 15 Nov 2020 10:51 )  PTT:31.4 sec    CAPILLARY BLOOD GLUCOSE          RADIOLOGY & ADDITIONAL TESTS:

## 2020-11-16 NOTE — OCCUPATIONAL THERAPY INITIAL EVALUATION ADULT - PERTINENT HX OF CURRENT PROBLEM, REHAB EVAL
89y Female [facility] pain and inability to ambulate.  Patient denies headstrike or LOC. Localizes pain to R hip/femur. Patient denies radiation of pain. Patient denies numbness/tingling/burning in the RLE. No other bone/joint complaints. Patient is a community ambulator at baseline without assistive devices. Patient has no issues w/ ADLs/IADLs. See below

## 2020-11-17 ENCOUNTER — TRANSCRIPTION ENCOUNTER (OUTPATIENT)
Age: 85
End: 2020-11-17

## 2020-11-17 LAB
SARS-COV-2 IGG SERPL QL IA: NEGATIVE — SIGNIFICANT CHANGE UP
SARS-COV-2 IGM SERPL IA-ACNC: <0.1 INDEX — SIGNIFICANT CHANGE UP

## 2020-11-17 RX ORDER — METOPROLOL TARTRATE 50 MG
1 TABLET ORAL
Qty: 0 | Refills: 0 | DISCHARGE
Start: 2020-11-17

## 2020-11-17 RX ORDER — POLYETHYLENE GLYCOL 3350 17 G/17G
17 POWDER, FOR SOLUTION ORAL
Qty: 0 | Refills: 0 | DISCHARGE
Start: 2020-11-17

## 2020-11-17 RX ORDER — SENNA PLUS 8.6 MG/1
2 TABLET ORAL
Qty: 0 | Refills: 0 | DISCHARGE
Start: 2020-11-17

## 2020-11-17 RX ORDER — RIVAROXABAN 15 MG-20MG
1 KIT ORAL
Qty: 0 | Refills: 0 | DISCHARGE
Start: 2020-11-17

## 2020-11-17 RX ORDER — PANTOPRAZOLE SODIUM 20 MG/1
1 TABLET, DELAYED RELEASE ORAL
Qty: 0 | Refills: 0 | DISCHARGE
Start: 2020-11-17

## 2020-11-17 RX ORDER — TRAMADOL HYDROCHLORIDE 50 MG/1
1 TABLET ORAL
Qty: 0 | Refills: 0 | DISCHARGE
Start: 2020-11-17

## 2020-11-17 RX ORDER — ACETAMINOPHEN 500 MG
3 TABLET ORAL
Qty: 0 | Refills: 0 | DISCHARGE
Start: 2020-11-17

## 2020-11-17 RX ORDER — OXYCODONE HYDROCHLORIDE 5 MG/1
1 TABLET ORAL
Qty: 0 | Refills: 0 | DISCHARGE
Start: 2020-11-17

## 2020-11-17 RX ORDER — ACETAMINOPHEN 500 MG
0 TABLET ORAL
Qty: 0 | Refills: 0 | DISCHARGE

## 2020-11-17 RX ADMIN — Medication 975 MILLIGRAM(S): at 06:31

## 2020-11-17 RX ADMIN — TRAMADOL HYDROCHLORIDE 50 MILLIGRAM(S): 50 TABLET ORAL at 01:27

## 2020-11-17 RX ADMIN — Medication 975 MILLIGRAM(S): at 07:00

## 2020-11-17 RX ADMIN — Medication 975 MILLIGRAM(S): at 21:46

## 2020-11-17 RX ADMIN — Medication 25 MILLIGRAM(S): at 17:42

## 2020-11-17 RX ADMIN — TRAMADOL HYDROCHLORIDE 50 MILLIGRAM(S): 50 TABLET ORAL at 20:20

## 2020-11-17 RX ADMIN — Medication 975 MILLIGRAM(S): at 14:34

## 2020-11-17 RX ADMIN — TRAMADOL HYDROCHLORIDE 50 MILLIGRAM(S): 50 TABLET ORAL at 10:20

## 2020-11-17 RX ADMIN — TRAMADOL HYDROCHLORIDE 50 MILLIGRAM(S): 50 TABLET ORAL at 09:32

## 2020-11-17 RX ADMIN — Medication 20 MILLIGRAM(S): at 06:31

## 2020-11-17 RX ADMIN — Medication 25 MICROGRAM(S): at 06:30

## 2020-11-17 RX ADMIN — PANTOPRAZOLE SODIUM 40 MILLIGRAM(S): 20 TABLET, DELAYED RELEASE ORAL at 06:30

## 2020-11-17 RX ADMIN — Medication 975 MILLIGRAM(S): at 22:15

## 2020-11-17 RX ADMIN — TRAMADOL HYDROCHLORIDE 50 MILLIGRAM(S): 50 TABLET ORAL at 19:50

## 2020-11-17 RX ADMIN — RIVAROXABAN 10 MILLIGRAM(S): KIT at 13:08

## 2020-11-17 RX ADMIN — Medication 25 MILLIGRAM(S): at 06:31

## 2020-11-17 RX ADMIN — TRAMADOL HYDROCHLORIDE 50 MILLIGRAM(S): 50 TABLET ORAL at 02:00

## 2020-11-17 RX ADMIN — Medication 1000 UNIT(S): at 13:08

## 2020-11-17 NOTE — DISCHARGE NOTE PROVIDER - NSDCMRMEDTOKEN_GEN_ALL_CORE_FT
acetaminophen 325 mg oral tablet: 3 tab(s) orally every 8 hours x 7 days  cholecalciferol oral tablet: 1000 unit(s) orally once a day  commode: 1 application buccal once a day   docusate sodium 100 mg oral capsule: 1 cap(s) orally 2 times a day  furosemide 20 mg oral tablet: 1 tab(s) orally once a day  metoprolol tartrate 25 mg oral tablet: 1 tab(s) orally 2 times a day  Mucinex: as needed  oxyCODONE 5 mg oral tablet: 1 tab(s) orally every 4 hours, As needed, Severe Pain (7 - 10)  pantoprazole 40 mg oral delayed release tablet: 1 tab(s) orally once a day (before a meal)  polyethylene glycol 3350 oral powder for reconstitution: 17 gram(s) orally once a day, As Needed  rivaroxaban 10 mg oral tablet: 1 tab(s) orally once a day  Rolling walker : 1 application buccal once a day   senna oral tablet: 2 tab(s) orally once a day (at bedtime), As needed, Constipation  Synthroid 25 mcg (0.025 mg) oral tablet: 1 tab(s) orally once a day  traMADol 50 mg oral tablet: 1 tab(s) orally every 6 hours, As needed, Mild Pain (1 - 3)  Transport wheelchair: 1 applicatorful buccal once a day   Tylenol 325 mg oral tablet: as needed  Visine: as needed   acetaminophen 325 mg oral tablet: 3 tab(s) orally every 8 hours x 7 days  cholecalciferol oral tablet: 1000 unit(s) orally once a day  commode: 1 application buccal once a day   furosemide 20 mg oral tablet: 1 tab(s) orally once a day  metoprolol tartrate 25 mg oral tablet: 1 tab(s) orally 2 times a day  oxyCODONE 5 mg oral tablet: 1 tab(s) orally every 4 hours, As needed, Severe Pain (7 - 10)  pantoprazole 40 mg oral delayed release tablet: 1 tab(s) orally once a day (before a meal)  polyethylene glycol 3350 oral powder for reconstitution: 17 gram(s) orally once a day, As Needed  rivaroxaban 10 mg oral tablet: 1 tab(s) orally once a day  senna oral tablet: 2 tab(s) orally once a day (at bedtime), As needed, Constipation  Synthroid 25 mcg (0.025 mg) oral tablet: 1 tab(s) orally once a day  traMADol 50 mg oral tablet: 1 tab(s) orally every 6 hours, As needed, Mod Pain   Visine: as needed

## 2020-11-17 NOTE — DISCHARGE NOTE PROVIDER - HOSPITAL COURSE
History of Present Illness:   89y Female [facility] pain and inability to ambulate.  Patient denies headstrike or LOC. Localizes pain to R hip/femur. Patient denies radiation of pain. Patient denies numbness/tingling/burning in the RLE. No other bone/joint complaints. Patient is a community ambulator at baseline without assistive devices. Patient has no issues w/ ADLs/IADLs.     PAST MEDICAL & SURGICAL HISTORY:  Hypothyroid  No pertinent past medical history  No significant past surgical history    11/15/20- Patient presents to the hospital with right hip pain after a fall, X-ray reveals right femoral neck fracture. Patient was admitted and medically cleared for surgery.  Patient was taken to surgery, underwent a right hip hemiarthroplasty- tolerated procedure without complications.  Patient was evaluated by Physical and Occupational therapy whom recommended rehab for discharge plan.  Patient is stable for discharge when bed is available

## 2020-11-17 NOTE — PROGRESS NOTE ADULT - SUBJECTIVE AND OBJECTIVE BOX
88yo female with pmh hypothyroidism, afib, presenting with fall, right hip pain.  Patient states she was getting out of bed to eat breakfast when she slipped and landed on her right hip.  Found by son shortly after who called ems.  Does not think she hit her head but is uncertain.  On aspirin.  No chest pain, shortness of breath, palpitations.  Denies numbness, weakness, fevers, headache, neck pain, back pain. Patient was brought to [facility] for further evaluation and treatment. Patient was found to have a right hip fx and is s/p a right hemiarthroplasty. Patient seen resting complaining of continued hip pain .      MEDICATIONS  (STANDING):  acetaminophen   Tablet .. 975 milliGRAM(s) Oral every 8 hours  cholecalciferol 1000 Unit(s) Oral daily  furosemide    Tablet 20 milliGRAM(s) Oral daily  levothyroxine 25 MICROGram(s) Oral daily  metoprolol tartrate 25 milliGRAM(s) Oral two times a day  pantoprazole    Tablet 40 milliGRAM(s) Oral before breakfast  polyethylene glycol 3350 17 Gram(s) Oral daily  rivaroxaban 10 milliGRAM(s) Oral daily    MEDICATIONS  (PRN):  artificial tears (preservative free) Ophthalmic Solution 1 Drop(s) Both EYES every 3 hours PRN Dry Eyes  bisacodyl Suppository 10 milliGRAM(s) Rectal daily PRN If no bowel movement by POD#2  magnesium hydroxide Suspension 30 milliLiter(s) Oral daily PRN Constipation  oxyCODONE    IR 2.5 milliGRAM(s) Oral every 4 hours PRN Moderate Pain (4 - 6)  oxyCODONE    IR 5 milliGRAM(s) Oral every 4 hours PRN Severe Pain (7 - 10)  senna 2 Tablet(s) Oral at bedtime PRN Constipation  traMADol 50 milliGRAM(s) Oral every 6 hours PRN Mild Pain (1 - 3)          VITALS:   T(C): 36.3 (11-17-20 @ 16:17), Max: 36.7 (11-16-20 @ 23:55)  HR: 91 (11-17-20 @ 16:17) (70 - 91)  BP: 133/64 (11-17-20 @ 16:17) (109/59 - 133/64)  RR: 18 (11-17-20 @ 16:17) (16 - 18)  SpO2: 98% (11-17-20 @ 16:17) (93% - 98%)  Wt(kg): --        LABS:        CBC Full  -  ( 16 Nov 2020 07:20 )  WBC Count : 11.16 K/uL  RBC Count : 3.66 M/uL  Hemoglobin : 11.4 g/dL  Hematocrit : 35.2 %  Platelet Count - Automated : 179 K/uL  Mean Cell Volume : 96.2 fl  Mean Cell Hemoglobin : 31.1 pg  Mean Cell Hemoglobin Concentration : 32.4 gm/dL  Auto Neutrophil # : x  Auto Lymphocyte # : x  Auto Monocyte # : x  Auto Eosinophil # : x  Auto Basophil # : x  Auto Neutrophil % : x  Auto Lymphocyte % : x  Auto Monocyte % : x  Auto Eosinophil % : x  Auto Basophil % : x    11-16    136  |  102  |  18  ----------------------------<  117<H>  4.3   |  22  |  0.77    Ca    8.6      16 Nov 2020 07:16            CAPILLARY BLOOD GLUCOSE          RADIOLOGY & ADDITIONAL TESTS:

## 2020-11-17 NOTE — DISCHARGE NOTE PROVIDER - NSDCACTIVITY_GEN_ALL_CORE
Walking - Outdoors allowed/Walking - Indoors allowed/Stairs allowed/Do not make important decisions/No heavy lifting/straining/Do not drive or operate machinery

## 2020-11-17 NOTE — PROGRESS NOTE ADULT - ASSESSMENT
Impression: Stable       Plan:   Continue present treatment                 Out of bed, ambulate, weight bearing as tolerated                  Physical therapy follow up, posterior precautions                  Continue to monitor    Alan Arreguin PA-C  Orthopaedic Surgery  Team pager [phone]  [phone]

## 2020-11-17 NOTE — PROVIDER CONTACT NOTE (OTHER) - ASSESSMENT
pt sleeping, AOx4, no c/o dizziness, SOB, chest pain. 100/64, HR 91, 36.4, 94% RA. Pt otherwise asymptomatic.

## 2020-11-17 NOTE — DISCHARGE NOTE PROVIDER - NSDCFUADDINST_GEN_ALL_CORE_FT
Dressing should be removed 14 days after surgery(If sutures or staples present d/c and apply steri strips). Out of bed, ambulate, weight bearing as tolerated- Physical therapy to assist with exercise and help increase endurance.   Patient may shower, limit direct water to dressing, if wet pat dry. Aqua cell dressing should be removed 14 days after surgery(If sutures or staples present d/c and apply steri strips). Out of bed, ambulate, weight bearing as tolerated- Physical therapy to assist with exercise and help increase endurance.   Patient may shower, limit direct water to dressing, if wet pat dry.

## 2020-11-17 NOTE — PROGRESS NOTE ADULT - ASSESSMENT
88 yo woman s/p fall at home this am presents with a right hip fx. Patient is scheduled for a right hemiarthroplasty.

## 2020-11-17 NOTE — DISCHARGE NOTE PROVIDER - CARE PROVIDER_API CALL
Ean Alexandra)  Orthopaedic Surgery  [address] [place] [address], [address]  [place] [address]  Phone: [phone]  Fax: [phone]  Follow Up Time:

## 2020-11-17 NOTE — PROGRESS NOTE ADULT - SUBJECTIVE AND OBJECTIVE BOX
ORTHO  Patient is a 89y old  Female who presents with a chief complaint of Right femoral neck hip fracture (16 Nov 2020 06:42)    Pt. resting without complaint    VS-  T(C): 36.4 (11-17-20 @ 05:31), Max: 37.1 (11-16-20 @ 08:58)  HR: 87 (11-17-20 @ 05:43) (70 - 90)  BP: 123/69 (11-17-20 @ 05:43) (105/65 - 132/67)  RR: 18 (11-17-20 @ 05:31) (18 - 18)  SpO2: 95% (11-17-20 @ 05:31) (94% - 96%)  Wt(kg): --    M.S. Alert  Extremity- Right hip aqua cell dressing- C/D/I  Neuro-              Motor- (+) Ankle- DF/PF              Sensation- grossly intact to light touch              Calves- soft, nontender- PAS                               11.4   11.16 )-----------( 179      ( 16 Nov 2020 07:20 )             35.2     11-16    136  |  102  |  18  ----------------------------<  117<H>  4.3   |  22  |  0.77    Ca    8.6      16 Nov 2020 07:16    TPro  7.2  /  Alb  4.0  /  TBili  0.4  /  DBili  x   /  AST  18  /  ALT  12  /  AlkPhos  92  11-15       ORTHO  Patient is a 89y old  Female who presents with a chief complaint of Right femoral neck hip fracture (16 Nov 2020 06:42)    Pt. resting without complaint    VS-  T(C): 36.4 (11-17-20 @ 05:31), Max: 37.1 (11-16-20 @ 08:58)  HR: 87 (11-17-20 @ 05:43) (70 - 90)  BP: 123/69 (11-17-20 @ 05:43) (105/65 - 132/67)  RR: 18 (11-17-20 @ 05:31) (18 - 18)  SpO2: 95% (11-17-20 @ 05:31) (94% - 96%)  Wt(kg): --    M.S. Alert  Extremity- Right hip aqua cell dressing- C/D/I, Abd pillow in place  Neuro-              Motor- (+) Ankle- DF/PF              Sensation- grossly intact to light touch              Calves- soft, nontender- PAS                               11.4   11.16 )-----------( 179      ( 16 Nov 2020 07:20 )             35.2     11-16    136  |  102  |  18  ----------------------------<  117<H>  4.3   |  22  |  0.77    Ca    8.6      16 Nov 2020 07:16    TPro  7.2  /  Alb  4.0  /  TBili  0.4  /  DBili  x   /  AST  18  /  ALT  12  /  AlkPhos  92  11-15

## 2020-11-17 NOTE — DISCHARGE NOTE PROVIDER - NSDCFUADDAPPT_GEN_ALL_CORE_FT
Please call Dr. Alexandra's office to schedule a follow up appointment after rehab. Recommend follow up with medical MD, within next 4 weeks.

## 2020-11-18 ENCOUNTER — TRANSCRIPTION ENCOUNTER (OUTPATIENT)
Age: 85
End: 2020-11-18

## 2020-11-18 VITALS
RESPIRATION RATE: 18 BRPM | DIASTOLIC BLOOD PRESSURE: 63 MMHG | HEART RATE: 85 BPM | OXYGEN SATURATION: 96 % | SYSTOLIC BLOOD PRESSURE: 105 MMHG | TEMPERATURE: 98 F

## 2020-11-18 LAB
ANION GAP SERPL CALC-SCNC: 7 MMOL/L — SIGNIFICANT CHANGE UP (ref 5–17)
BUN SERPL-MCNC: 16 MG/DL — SIGNIFICANT CHANGE UP (ref 7–23)
CALCIUM SERPL-MCNC: 8.2 MG/DL — LOW (ref 8.4–10.5)
CHLORIDE SERPL-SCNC: 103 MMOL/L — SIGNIFICANT CHANGE UP (ref 96–108)
CO2 SERPL-SCNC: 27 MMOL/L — SIGNIFICANT CHANGE UP (ref 22–31)
CREAT SERPL-MCNC: 0.86 MG/DL — SIGNIFICANT CHANGE UP (ref 0.5–1.3)
GLUCOSE SERPL-MCNC: 98 MG/DL — SIGNIFICANT CHANGE UP (ref 70–99)
HCT VFR BLD CALC: 34.4 % — LOW (ref 34.5–45)
HGB BLD-MCNC: 10.8 G/DL — LOW (ref 11.5–15.5)
MCHC RBC-ENTMCNC: 30.6 PG — SIGNIFICANT CHANGE UP (ref 27–34)
MCHC RBC-ENTMCNC: 31.4 GM/DL — LOW (ref 32–36)
MCV RBC AUTO: 97.5 FL — SIGNIFICANT CHANGE UP (ref 80–100)
NRBC # BLD: 0 /100 WBCS — SIGNIFICANT CHANGE UP (ref 0–0)
PLATELET # BLD AUTO: 155 K/UL — SIGNIFICANT CHANGE UP (ref 150–400)
POTASSIUM SERPL-MCNC: 4 MMOL/L — SIGNIFICANT CHANGE UP (ref 3.5–5.3)
POTASSIUM SERPL-SCNC: 4 MMOL/L — SIGNIFICANT CHANGE UP (ref 3.5–5.3)
RBC # BLD: 3.53 M/UL — LOW (ref 3.8–5.2)
RBC # FLD: 13.7 % — SIGNIFICANT CHANGE UP (ref 10.3–14.5)
SARS-COV-2 RNA SPEC QL NAA+PROBE: SIGNIFICANT CHANGE UP
SODIUM SERPL-SCNC: 137 MMOL/L — SIGNIFICANT CHANGE UP (ref 135–145)
WBC # BLD: 8.72 K/UL — SIGNIFICANT CHANGE UP (ref 3.8–10.5)
WBC # FLD AUTO: 8.72 K/UL — SIGNIFICANT CHANGE UP (ref 3.8–10.5)

## 2020-11-18 PROCEDURE — 85730 THROMBOPLASTIN TIME PARTIAL: CPT

## 2020-11-18 PROCEDURE — 85025 COMPLETE CBC W/AUTO DIFF WBC: CPT

## 2020-11-18 PROCEDURE — 85027 COMPLETE CBC AUTOMATED: CPT

## 2020-11-18 PROCEDURE — U0003: CPT

## 2020-11-18 PROCEDURE — 86769 SARS-COV-2 COVID-19 ANTIBODY: CPT

## 2020-11-18 PROCEDURE — 88305 TISSUE EXAM BY PATHOLOGIST: CPT

## 2020-11-18 PROCEDURE — 96376 TX/PRO/DX INJ SAME DRUG ADON: CPT

## 2020-11-18 PROCEDURE — 93005 ELECTROCARDIOGRAM TRACING: CPT

## 2020-11-18 PROCEDURE — 85610 PROTHROMBIN TIME: CPT

## 2020-11-18 PROCEDURE — 80053 COMPREHEN METABOLIC PANEL: CPT

## 2020-11-18 PROCEDURE — 97165 OT EVAL LOW COMPLEX 30 MIN: CPT

## 2020-11-18 PROCEDURE — 97530 THERAPEUTIC ACTIVITIES: CPT

## 2020-11-18 PROCEDURE — 86850 RBC ANTIBODY SCREEN: CPT

## 2020-11-18 PROCEDURE — 88311 DECALCIFY TISSUE: CPT

## 2020-11-18 PROCEDURE — 86901 BLOOD TYPING SEROLOGIC RH(D): CPT

## 2020-11-18 PROCEDURE — C9399: CPT

## 2020-11-18 PROCEDURE — C1713: CPT

## 2020-11-18 PROCEDURE — 97161 PT EVAL LOW COMPLEX 20 MIN: CPT

## 2020-11-18 PROCEDURE — 99285 EMERGENCY DEPT VISIT HI MDM: CPT | Mod: 25

## 2020-11-18 PROCEDURE — 87635 SARS-COV-2 COVID-19 AMP PRB: CPT

## 2020-11-18 PROCEDURE — 97116 GAIT TRAINING THERAPY: CPT

## 2020-11-18 PROCEDURE — 71045 X-RAY EXAM CHEST 1 VIEW: CPT

## 2020-11-18 PROCEDURE — 73552 X-RAY EXAM OF FEMUR 2/>: CPT

## 2020-11-18 PROCEDURE — 96374 THER/PROPH/DIAG INJ IV PUSH: CPT

## 2020-11-18 PROCEDURE — 70450 CT HEAD/BRAIN W/O DYE: CPT

## 2020-11-18 PROCEDURE — 86900 BLOOD TYPING SEROLOGIC ABO: CPT

## 2020-11-18 PROCEDURE — 80048 BASIC METABOLIC PNL TOTAL CA: CPT

## 2020-11-18 PROCEDURE — 72170 X-RAY EXAM OF PELVIS: CPT

## 2020-11-18 PROCEDURE — C1889: CPT

## 2020-11-18 PROCEDURE — C1776: CPT

## 2020-11-18 PROCEDURE — 73502 X-RAY EXAM HIP UNI 2-3 VIEWS: CPT

## 2020-11-18 RX ADMIN — PANTOPRAZOLE SODIUM 40 MILLIGRAM(S): 20 TABLET, DELAYED RELEASE ORAL at 05:36

## 2020-11-18 RX ADMIN — POLYETHYLENE GLYCOL 3350 17 GRAM(S): 17 POWDER, FOR SOLUTION ORAL at 13:16

## 2020-11-18 RX ADMIN — Medication 975 MILLIGRAM(S): at 06:06

## 2020-11-18 RX ADMIN — Medication 975 MILLIGRAM(S): at 13:16

## 2020-11-18 RX ADMIN — Medication 20 MILLIGRAM(S): at 05:36

## 2020-11-18 RX ADMIN — Medication 975 MILLIGRAM(S): at 05:36

## 2020-11-18 RX ADMIN — Medication 25 MILLIGRAM(S): at 05:36

## 2020-11-18 RX ADMIN — Medication 25 MICROGRAM(S): at 05:36

## 2020-11-18 RX ADMIN — RIVAROXABAN 10 MILLIGRAM(S): KIT at 13:17

## 2020-11-18 RX ADMIN — Medication 1000 UNIT(S): at 13:16

## 2020-11-18 NOTE — PROGRESS NOTE ADULT - ASSESSMENT
89y/o FM s/p right hip hemiarthroplasty POD#3, physical therapy, d/c to rehab when bed available  Kriss Richardson PA-C  Orthopaedic Surgery  Team pager [phone]  [facility] [phone]  [phone]

## 2020-11-18 NOTE — PROGRESS NOTE ADULT - ASSESSMENT
89 yo woman s/p fall at home this am presents with a right hip fx. Patient is s/p  right hemiarthroplasty.

## 2020-11-18 NOTE — PROGRESS NOTE ADULT - SUBJECTIVE AND OBJECTIVE BOX
Patient is a 89y old  Female who presents with a chief complaint of Right hip fracture  Patient s/p right hip hemiarthroplasty POD#3   Patient comfortable  No complaints    T(C): 36.8 (11-18-20 @ 04:54), Max: 36.8 (11-18-20 @ 04:54)  HR: 72 (11-18-20 @ 04:54) (71 - 91)  BP: 119/61 (11-18-20 @ 04:54) (100/64 - 133/64)  RR: 18 (11-18-20 @ 04:54) (16 - 18)  SpO2: 94% (11-18-20 @ 04:54) (93% - 98%)    PHYSICAL EXAM:  NAD, Alert  [Right ] Hip: Aquacell dressing C/D/I; sensation grossly intact to light touch; (+) DF/PF; (+) Distal Pulses; No Calf tenderness B/L, PAS     LABS:                      11.4   11.16 )-----------( 179      ( 16 Nov 2020 07:20 )             35.2     11-16  136  |  102  |  18  ----------------------------<  117<H>  4.3   |  22  |  0.77  Ca    8.6      16 Nov 2020 07:16

## 2020-11-18 NOTE — DISCHARGE NOTE NURSING/CASE MANAGEMENT/SOCIAL WORK - PATIENT PORTAL LINK FT
You can access the FollowMyHealth Patient Portal offered by [facility] by registering at the following website: [email or web address]. By joining [employer]’s FollowMyHealth portal, you will also be able to view your health information using other applications (apps) compatible with our system.

## 2020-11-18 NOTE — PROGRESS NOTE ADULT - SUBJECTIVE AND OBJECTIVE BOX
89yo female with pmh hypothyroidism, afib, presenting with fall, right hip pain.  Patient states she was getting out of bed to eat breakfast when she slipped and landed on her right hip.  Found by son shortly after who called ems.  Does not think she hit her head but is uncertain.  On aspirin.  No chest pain, shortness of breath, palpitations.  Denies numbness, weakness, fevers, headache, neck pain, back pain. Patient was brought to [facility] for further evaluation and treatment. Patient was found to have a right hip fx and is s/p a right hemiarthroplasty. Patient seen resting complaining of continued hip pain . Cooperating with PT.    [facility];[ re;rojelio as panend      MEDICATIONS  (STANDING):  acetaminophen   Tablet .. 975 milliGRAM(s) Oral every 8 hours  cholecalciferol 1000 Unit(s) Oral daily  furosemide    Tablet 20 milliGRAM(s) Oral daily  levothyroxine 25 MICROGram(s) Oral daily  metoprolol tartrate 25 milliGRAM(s) Oral two times a day  pantoprazole    Tablet 40 milliGRAM(s) Oral before breakfast  polyethylene glycol 3350 17 Gram(s) Oral daily  rivaroxaban 10 milliGRAM(s) Oral daily    MEDICATIONS  (PRN):  artificial tears (preservative free) Ophthalmic Solution 1 Drop(s) Both EYES every 3 hours PRN Dry Eyes  bisacodyl Suppository 10 milliGRAM(s) Rectal daily PRN If no bowel movement by POD#2  magnesium hydroxide Suspension 30 milliLiter(s) Oral daily PRN Constipation  oxyCODONE    IR 2.5 milliGRAM(s) Oral every 4 hours PRN Moderate Pain (4 - 6)  oxyCODONE    IR 5 milliGRAM(s) Oral every 4 hours PRN Severe Pain (7 - 10)  senna 2 Tablet(s) Oral at bedtime PRN Constipation  traMADol 50 milliGRAM(s) Oral every 6 hours PRN Mild Pain (1 - 3)      Vital Signs Last 24 Hrs  T(C): 36.7 (18 Nov 2020 14:31), Max: 37.5 (18 Nov 2020 09:10)  T(F): 98.1 (18 Nov 2020 14:31), Max: 99.5 (18 Nov 2020 09:10)  HR: 85 (18 Nov 2020 14:31) (71 - 87)  BP: 105/63 (18 Nov 2020 14:31) (105/63 - 119/61)  BP(mean): --  RR: 18 (18 Nov 2020 14:31) (18 - 18)  SpO2: 96% (18 Nov 2020 14:31) (94% - 96%)      GENERAL: NAD, well-groomed, well-developed  HEAD:  Atraumatic, Normocephalic  EYES: EOMI, PERRLA, conjunctiva and sclera clear  ENMT: No tonsillar erythema, exudates, or enlargement; Moist mucous membranes, Good dentition, No lesions  NECK: Supple, No JVD, Normal thyroid  NERVOUS SYSTEM:  Alert & Oriented X3, Good concentration; Motor Strength 5/5 B/L upper and lower extremities; DTRs 2+ intact and symmetric  CHEST/LUNG: Clear to percussion bilaterally; No rales, rhonchi, wheezing, or rubs  HEART: Regular rate and rhythm; No murmurs, rubs, or gallops  ABDOMEN: Soft, Nontender, Nondistended; Bowel sounds present  EXTREMITIES:  2+ Peripheral Pulses, No clubbing, cyanosis, or edema  LYMPH: No lymphadenopathy noted  SKIN: No rashes or lesions    LABS:        CBC Full  -  ( 18 Nov 2020 07:15 )  WBC Count : 8.72 K/uL  RBC Count : 3.53 M/uL  Hemoglobin : 10.8 g/dL  Hematocrit : 34.4 %  Platelet Count - Automated : 155 K/uL  Mean Cell Volume : 97.5 fl  Mean Cell Hemoglobin : 30.6 pg  Mean Cell Hemoglobin Concentration : 31.4 gm/dL  Auto Neutrophil # : x  Auto Lymphocyte # : x  Auto Monocyte # : x  Auto Eosinophil # : x  Auto Basophil # : x  Auto Neutrophil % : x  Auto Lymphocyte % : x  Auto Monocyte % : x  Auto Eosinophil % : x  Auto Basophil % : x    11-18    137  |  103  |  16  ----------------------------<  98  4.0   |  27  |  0.86    Ca    8.2<L>      18 Nov 2020 07:15                CBC Full  -  ( 16 Nov 2020 07:20 )  WBC Count : 11.16 K/uL  RBC Count : 3.66 M/uL  Hemoglobin : 11.4 g/dL  Hematocrit : 35.2 %  Platelet Count - Automated : 179 K/uL  Mean Cell Volume : 96.2 fl  Mean Cell Hemoglobin : 31.1 pg  Mean Cell Hemoglobin Concentration : 32.4 gm/dL  Auto Neutrophil # : x  Auto Lymphocyte # : x  Auto Monocyte # : x  Auto Eosinophil # : x  Auto Basophil # : x  Auto Neutrophil % : x  Auto Lymphocyte % : x  Auto Monocyte % : x  Auto Eosinophil % : x  Auto Basophil % : x    11-16    136  |  102  |  18  ----------------------------<  117<H>  4.3   |  22  |  0.77    Ca    8.6      16 Nov 2020 07:16            CAPILLARY BLOOD GLUCOSE          RADIOLOGY & ADDITIONAL TESTS:

## 2020-11-20 LAB — SURGICAL PATHOLOGY STUDY: SIGNIFICANT CHANGE UP

## 2021-03-28 PROBLEM — I31.9 PLEUROPERICARDITIS: Status: ACTIVE | Noted: 2019-11-19

## 2021-03-29 ENCOUNTER — APPOINTMENT (OUTPATIENT)
Dept: CARDIOLOGY | Facility: CLINIC | Age: 86
End: 2021-03-29
Payer: MEDICARE

## 2021-03-29 DIAGNOSIS — I31.9 DISEASE OF PERICARDIUM, UNSPECIFIED: ICD-10-CM

## 2021-03-29 NOTE — DISCUSSION/SUMMARY
[FreeTextEntry1] : PRELIMINARY NOTE\par \par \par \par Pleuropericarditis, improved with conservative therapy.  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  Will continue metoprolol at current dose.\par \par Continue low dose ASA; patient has a history of falls.  As the episode of PAF was provoked by the pericardial effusion, now resolving, I do not think that long-term anticoagulation is mandatory in this situation.  \par \par CAD -asymptomatic.  Will continue on aspirin and metoprolol.\par \par L carotid a. stenosis. -Continue aspirin.\par \par She will return in 2 months; we will arrange for follow-up echocardiogram at that time.

## 2021-03-29 NOTE — HISTORY OF PRESENT ILLNESS
[FreeTextEntry1] : In October of 2019, she was admitted to [facility] for CHF and developed AF with a RVR.  Echo showed a moderate to large pericardial effusion.  She was sent to interventional radiology, with the plan to drain the effusion, however, on CT scan, the effusion appeared smaller in size and drainage was not necessary.  She reverted to normal rhythm and was treated with metoprolol to maintain SR.  \par \par Echo showed mild MR & preserved LV systolic function with EF of 68%.  CT of the chest demonstrated coronary artery and aortic calcifications.\par \par As A. fib had resolved and she has a history of falls, she was d/c'd on low-dose aspirin rather than an anticoagulant.  She was also to continue on Lasix and metoprolol.\par \par I saw her last in January of 2020.  As she returns today, \par \par \par \par she comes in the company of her son and  (both name Angel).  She has been getting stronger since I saw her last.  She is independent at home and able to climb the stairs.  She reports no resting breathlessness; she has mild STOCK but this has improved.  She describes no chest discomfort or palpitations.  There have been no episodes of orthopnea or PND.

## 2021-03-29 NOTE — REASON FOR VISIT
[FreeTextEntry1] : PATIENT CANCELLED APPT. \par \par \par PRELIMINARY NOTE\par \par \par Kavita Trace returns for f/u re \par \par non-obstructive CAD, hx. of previous pericardial effusion with PAF.

## 2021-04-06 ENCOUNTER — APPOINTMENT (OUTPATIENT)
Dept: CARDIOLOGY | Facility: CLINIC | Age: 86
End: 2021-04-06
Payer: MEDICARE

## 2021-04-06 ENCOUNTER — NON-APPOINTMENT (OUTPATIENT)
Age: 86
End: 2021-04-06

## 2021-04-06 VITALS
OXYGEN SATURATION: 94 % | DIASTOLIC BLOOD PRESSURE: 68 MMHG | HEIGHT: 62 IN | RESPIRATION RATE: 14 BRPM | SYSTOLIC BLOOD PRESSURE: 126 MMHG | HEART RATE: 69 BPM

## 2021-04-06 PROCEDURE — 99215 OFFICE O/P EST HI 40 MIN: CPT

## 2021-04-06 PROCEDURE — 99072 ADDL SUPL MATRL&STAF TM PHE: CPT

## 2021-04-06 PROCEDURE — 93000 ELECTROCARDIOGRAM COMPLETE: CPT

## 2021-04-06 NOTE — DISCUSSION/SUMMARY
[FreeTextEntry1] : Dyspnea, cause unclear.  Recent CXR with clear lungs and normal heart size.   Not clear to me if need to void or dyspnea awakens her from sleep.  Sitting up/using more pillows does not help.  Prior echo showed normal LV function.\par Suspect SOB is not cardiac in origin; ?due to deconditioning when she needs to get out of bed at night to void?\par Will have her return for a TTE to re-assess myocardial structure and function.\par \par Pleuropericarditis, resolved with conservative therapy.  Cardiac silhouette was normal in size on CXR at [facility] last November and on recent CXR by Dr. 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  To continue metoprolol at current dose; not treated with A/C due to fall risk.  Will continue low dose ASA.\par \par Subclinical CAD as noted incidentally on CT scan of chest - no ischemic sxs.  Continue on aspirin and metoprolol.\par \par L carotid a. stenosis. -Continue aspirin.\par \par As I see no evidence of CHF on exam today or on recent CXR, will reduce furosemide to 20 mg qod, given complaints of frequent urination and dry mouth.  If she tolerates reduced dose, will consider stopping it at next O.V.\par \par She will return in 6 weeks; will arrange for TT at that visit.

## 2021-04-06 NOTE — PHYSICAL EXAM
[General Appearance - Well Developed] : well developed [Normal Appearance] : normal appearance [General Appearance - Well Nourished] : well nourished [Well Groomed] : well groomed [General Appearance - In No Acute Distress] : no acute distress [Normal Conjunctiva] : the conjunctiva exhibited no abnormalities [Eyelids - No Xanthelasma] : the eyelids demonstrated no xanthelasmas [Normal Jugular Venous A Waves Present] : normal jugular venous A waves present [Normal Jugular Venous V Waves Present] : normal jugular venous V waves present [No Jugular Venous Guerra A Waves] : no jugular venous guerra A waves [Respiration, Rhythm And Depth] : normal respiratory rhythm and effort [Auscultation Breath Sounds / Voice Sounds] : lungs were clear to auscultation bilaterally [Heart Rate And Rhythm] : heart rate and rhythm were normal [Bowel Sounds] : normal bowel sounds [Nail Clubbing] : no clubbing of the fingernails [Abnormal Walk] : normal gait [Cyanosis, Localized] : no localized cyanosis [Skin Color & Pigmentation] : normal skin color and pigmentation [Oriented To Time, Place, And Person] : oriented to person, place, and time [] : no rash [Impaired Insight] : insight and judgment were intact [Affect] : the affect was normal [Mood] : the mood was normal [FreeTextEntry1] : No bruit.  Soft, non-tender.  Liver and spleen not palpable; aortic pulsation not palpable.

## 2021-04-06 NOTE — ASSESSMENT
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 stenosis.

## 2021-04-06 NOTE — REASON FOR VISIT
[FreeTextEntry1] : \par Kavita Trace returns for f/u re dyspnea.  She has a hx. of non-obstructive CAD and prior hx. of pericardial effusion with PAF at that time.

## 2021-04-06 NOTE — HISTORY OF PRESENT ILLNESS
[FreeTextEntry1] : In October of 2019, she was admitted to [facility] for CHF and developed AF with a RVR.  Echo showed a moderate to large pericardial effusion.  She was sent to interventional radiology, with plan to drain the effusion, however, on CT scan, the effusion appeared smaller in size and drainage was deemed unnecessary.  She reverted to normal rhythm and was kept on metoprolol to maintain SR.  \par \par Echo at that time showed mild MR & LV EF of 68%.  CT of the chest incidentally demonstrated coronary artery and aortic calcifications.\par \par As A. fib had resolved, and she had a history of falls, she was d/c'd on low-dose ASA rather than an anticoagulant, as well as Lasix and metoprolol.\par \par I saw her last in January of 2020.  As she returns today, she tells me that she was hospitalized last November at [facility].  She slipped getting out of bed and suffered a R femoral neck fracture which required surgical repair.  Admission AP CXR was unremarkable; she tolerated surgery without problem.\par \par She returns today in the company of her son, Angel.  She reports awakening at night due to need to urinate but also notice dyspnea at that time as well.  She does not report STOCK during the day.  Her internist, Dr. Bradley, sent her for a CXR was showed clear lungs.  She reports no CP and no palps.  She prefers to sleep flat on a small pillow; she did not feel better when propped up on two or three pillows.  She does not describe PND.  She reports a dry mouth and frequent urination which she attributes to the water pill.

## 2021-05-13 PROBLEM — I48.0 PAF (PAROXYSMAL ATRIAL FIBRILLATION): Status: ACTIVE | Noted: 2019-11-19

## 2021-05-13 PROBLEM — I34.0 MILD MITRAL INSUFFICIENCY: Status: ACTIVE | Noted: 2020-03-16

## 2021-05-13 PROBLEM — I25.10 CORONARY ARTERY DISEASE, NON-OCCLUSIVE: Status: ACTIVE | Noted: 2019-11-20

## 2021-05-13 PROBLEM — I51.89 DIASTOLIC DYSFUNCTION: Status: ACTIVE | Noted: 2020-03-16

## 2021-05-13 PROBLEM — R06.00 DOE (DYSPNEA ON EXERTION): Status: ACTIVE | Noted: 2021-04-06

## 2021-05-14 ENCOUNTER — NON-APPOINTMENT (OUTPATIENT)
Age: 86
End: 2021-05-14

## 2021-05-14 ENCOUNTER — APPOINTMENT (OUTPATIENT)
Dept: CARDIOLOGY | Facility: CLINIC | Age: 86
End: 2021-05-14
Payer: MEDICARE

## 2021-05-14 VITALS
SYSTOLIC BLOOD PRESSURE: 132 MMHG | HEIGHT: 62 IN | HEART RATE: 70 BPM | DIASTOLIC BLOOD PRESSURE: 60 MMHG | OXYGEN SATURATION: 95 %

## 2021-05-14 DIAGNOSIS — I48.0 PAROXYSMAL ATRIAL FIBRILLATION: ICD-10-CM

## 2021-05-14 DIAGNOSIS — I25.10 ATHEROSCLEROTIC HEART DISEASE OF NATIVE CORONARY ARTERY W/OUT ANGINA PECTORIS: ICD-10-CM

## 2021-05-14 DIAGNOSIS — R06.00 DYSPNEA, UNSPECIFIED: ICD-10-CM

## 2021-05-14 DIAGNOSIS — I51.89 OTHER ILL-DEFINED HEART DISEASES: ICD-10-CM

## 2021-05-14 DIAGNOSIS — I34.0 NONRHEUMATIC MITRAL (VALVE) INSUFFICIENCY: ICD-10-CM

## 2021-05-14 PROCEDURE — 99214 OFFICE O/P EST MOD 30 MIN: CPT | Mod: 25

## 2021-05-14 PROCEDURE — 93000 ELECTROCARDIOGRAM COMPLETE: CPT | Mod: 59

## 2021-05-14 PROCEDURE — 93306 TTE W/DOPPLER COMPLETE: CPT

## 2021-05-14 PROCEDURE — 99072 ADDL SUPL MATRL&STAF TM PHE: CPT

## 2021-05-14 NOTE — HISTORY OF PRESENT ILLNESS
[FreeTextEntry1] : In October of 2019, she was admitted to [facility] for CHF and developed AF with a RVR.  Echo showed a moderate to large pericardial effusion.  She was sent to interventional radiology, with plan to drain the effusion, however, on CT scan, the effusion appeared smaller in size and drainage was deemed unnecessary.  She reverted to normal rhythm and was kept on metoprolol to maintain SR.  \par \par Echo at that time showed mild MR & LV EF of 68%.  CT of the chest incidentally demonstrated coronary artery and aortic calcifications.\par \par As A. fib had resolved, and she had a history of falls, she was d/c'd on low-dose ASA rather than an anticoagulant, as well as Lasix and metoprolol.\par \par I saw her last in April; she had been hospitalized last November after falling and sufferng a R femoral neck fracture which required surgical repair.  When I saw her in April, she reported awakening at night due to need to urinate but also notice dyspnea at that time as well.  She does not report STOCK during the day.  Her internist, Dr. Bradley, sent her for a CXR which showed clear lungs.  \par \par As she returns today, she has been feeling better.  She reports no further episodes of dyspnea.  She describes no CP or palps.  There have been no episodes of orthopnea or PND.  Reducing the dose of Lasix has not caused any problem; even on 20 mg. daily, she reports frequent urination.

## 2021-05-14 NOTE — ASSESSMENT
[FreeTextEntry1] : Dyspnea, resolved\par \par Pleuropericarditis, resolved;  moderate to large pericardial effusion on presentation with early tamponade; improved with conservative management.\par PAF in the setting of active pleuropericarditis with pericardial and pleural effusions.\par \par Mild MR\par \par Mild diastolic LV dysfunction\par \par Subclinical CAD\par \par L carotid a. stenosis.

## 2021-05-14 NOTE — DISCUSSION/SUMMARY
[FreeTextEntry1] : \par Dyspnea, cause unclear; resolved.  TTE shows preserved myocardial function and only mild MR.  Small pericardial effusion seen.  No evidence that dyspnea was on a cardiac basis.\par \par Prior episode of pleuropericarditis, resolved with conservative therapy.  Only small pericardial effusion seen on TTE today. 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  To continue metoprolol at current dose; not treated with A/C due to fall risk, to continue low dose ASA.\par \par Subclinical CAD as noted incidentally on CT scan of chest - no ischemic sxs.  Continue on aspirin and metoprolol.\par \par L carotid a. stenosis. -Continue aspirin.\par \par Given frequent urination and preserved cardiac function, will d/c Lasix. \par \par She will return on a prn basis; advised patient and her son, Angel, to call as needed if there are any problems.

## 2022-08-23 NOTE — DISCHARGE NOTE PROVIDER - NSDCCPCAREPLAN_GEN_ALL_CORE_FT
PULMONARY NOTE  Patient: Fadi Carmona Date: 2022  : 2020   28 month old male     Chief Complaint   Patient presents with   • Hospital F/U   This is a follow-up visit after a very lengthy and severe exacerbation of asthma.  He was in the ICU for several days.  He had a concomitant enterorhino viral infection that may have contributed to making that episode so severe and prolonged.    Currently he is symptom-free and is active and sleeping well.  He has a minor cough with activity but otherwise no other symptoms.  The need for albuterol is minimal additionally in spite of this long ICU stay he is not exhibiting any behavior problems    Asthma  Associated symptoms include coughing and wheezing. Pertinent negatives include no sore throat. His past medical history is significant for asthma.   Allergies  Associated symptoms include coughing and wheezing. Pertinent negatives include no diarrhea, eye redness, itching or rash. His past medical history is significant for asthma.       Visit Vitals  Pulse 97   Temp 97.8 °F (36.6 °C) (Temporal)   Ht 3' 2.19\" (0.97 m)   Wt 15.2 kg (33 lb 8.2 oz)   SpO2 98%   BMI 16.15 kg/m²       ALLERGIES:  Patient has no active allergies.     Current Outpatient Medications   Medication Sig Dispense Refill   • acetaminophen (TYLENOL) 160 MG/5ML suspension Take 6.9 mLs by mouth every 6 hours as needed for Fever.     • ibuprofen (CHILDRENS ADVIL) 100 MG/5ML suspension Take 7.4 mLs by mouth every 6 hours as needed for Fever.     • budesonide-formoterol (SYMBICORT) 80-4.5 MCG/ACT inhaler Inhale 2 puffs into the lungs in the morning and 2 puffs in the evening. 10.2 g 12   • albuterol 108 (90 Base) MCG/ACT inhaler INHALE 4 PUFFS BY MOUTH INTO THE LUNGS EVERY 4 HOURS AS NEEDED FOR SHORTNESS OF BREATH OR WHEEZING. 18 g 3     No current facility-administered medications for this visit.       History reviewed. No pertinent past medical history.    Past Surgical History:   Procedure  Laterality Date   • Circumcision baby         Family History   Problem Relation Age of Onset   • Asthma Mother         Social History     Socioeconomic History   • Marital status: Single     Spouse name: Not on file   • Number of children: Not on file   • Years of education: Not on file   • Highest education level: Not on file   Occupational History   • Not on file   Tobacco Use   • Smoking status: Never Smoker   • Smokeless tobacco: Never Used   Vaping Use   • Vaping Use: never used   Substance and Sexual Activity   • Alcohol use: Not on file   • Drug use: Never   • Sexual activity: Never   Other Topics Concern   • Not on file   Social History Narrative   • Not on file     Social Determinants of Health     Financial Resource Strain: Not on file   Food Insecurity: No Food Insecurity   • Social Determinants: Food Insecurity: Never   Transportation Needs: Not on file   Physical Activity: Not on file   Stress: Not on file   Social Connections: Not on file   Intimate Partner Violence: Not At Risk   • Social Determinants: Intimate Partner Violence Past Fear: No   • Social Determinants: Intimate Partner Violence Current Fear: No       Review of Systems   Constitutional: Negative for malaise/fatigue and weight loss.   HENT: Negative for ear discharge, nosebleeds, sinus pain and sore throat.    Eyes: Negative for discharge and redness.   Respiratory: Positive for cough, shortness of breath and wheezing. Negative for hemoptysis and sputum production.    Cardiovascular: Negative.    Gastrointestinal: Negative for constipation, diarrhea and heartburn.   Genitourinary: Negative.    Musculoskeletal: Negative.    Skin: Negative for itching and rash.   Neurological: Negative.    Endo/Heme/Allergies: Negative for environmental allergies.   Psychiatric/Behavioral: Negative for depression, substance abuse and suicidal ideas. The patient is not nervous/anxious and does not have insomnia.         Physical Exam  Constitutional:        General: He is active.      Appearance: He is well-developed.   HENT:      Head: Normocephalic.      Right Ear: Tympanic membrane and ear canal normal.      Left Ear: Tympanic membrane and ear canal normal.      Nose: Nose normal.      Mouth/Throat:      Pharynx: Oropharynx is clear.   Eyes:      Pupils: Pupils are equal, round, and reactive to light.   Cardiovascular:      Rate and Rhythm: Regular rhythm.      Heart sounds: S1 normal and S2 normal.   Pulmonary:      Effort: Respiratory distress and retractions present.      Breath sounds: Wheezing present.   Abdominal:      General: Bowel sounds are normal.      Palpations: Abdomen is soft.   Musculoskeletal:         General: Normal range of motion.   Skin:     General: Skin is warm.   Neurological:      Mental Status: He is alert.           ASSESSMENT/PLAN  Problem List Items Addressed This Visit        Pulmonary and Pneumonias    Severe persistent Asthma- Primary      Even though he is doing well, because of the severe recent episode, and the season but today, and his exposure to other kids at  I will leave his daily therapy and change.  And also prescribed 5 days of oral steroids for parents to keep at home and use for future flareup.    RTC 2 months    Over 30 minutes were spent with visual contact with patient during this visit    Kingsley Cheng MD  Pediatric Pulmonology/Allergy Immunology  Director [facility]  Director [facility]STOR [facility]  [facility]   PRINCIPAL DISCHARGE DIAGNOSIS  Diagnosis: Closed fracture of right hip, initial encounter  Assessment and Plan of Treatment:

## 2022-09-27 NOTE — PHYSICAL THERAPY INITIAL EVALUATION ADULT - ONSET DATE, REHAB EVAL
No new care gaps identified.  [facility] Embedded Care Gaps. Reference number: [id number]. 9/27/2022   5:13:27 AM CDT   15-Nov-2020

## 2023-01-01 NOTE — DISCHARGE NOTE PROVIDER - REASON FOR ADMISSION
WEIGHT CHECK      SUBJECTIVE:  Ethan Franklin is a 3 month old male infant here for a weight check.    Birth Weight:  8 lb 11.3 oz (3950 g)   There were no vitals filed for this visit.    Feeding:  {BREAST OR BOTTLE:900017}    STOOLS:  {numbers:974571} per day    URINE OUTPUT:  {numbers:713474} per day    SLEEP PATTERNS:  Lethargy:  {YES/NO, NO DEFAULT:64::\"no\"}  Irritability:  {YES/NO, NO DEFAULT:64::\"no\"}  Fever:  {YES/NO, NO DEFAULT:64::\"no\"}  Emesis:  {YES/NO, NO DEFAULT:64::\"no\"}    Birth history, medical history, surgical history, and family history reviewed and updated.      PHYSICAL EXAM:  There were no vitals taken for this visit.  53%  GENERAL:  Well appearing male infant, nontoxic, no acute distress.  Alert and interactive.  SKIN:  Warm, normal turgor.  No cyanosis.  No bruises or lesions.  HEAD:  Normocephalic, atraumatic.  Anterior fontanel open, soft and flat.  EYES:  Conjunctivae appear normal with {CONJUNCTIVAE INFANT:910707::\"neither icterus nor subconjunctival hemorrhage\"}.  PERRL (Pupils equal, round, reactive to light), EOMI (extraocular movements intact), positive red reflex bilaterally.  HEART:  Regular rate and rhythm.  Quiet precordium.  Normal S1, S2.  No murmurs, rubs, gallops.  Equal femoral pulses.  LUNGS:  Clear to auscultation bilaterally.  No wheezes, rales, rhonchi.  Normal work of breathing.  ABDOMEN:  Umbilical stump is {UMBILICAL STUMP:583937::\"normal\"}.  Soft, nontender.  No organomegaly or masses.        ASSESSMENT:  Ethan Franklin is a 3 month old male infant here for weight check.  Patient {HAS HAS NOT:30746} had adequate weight gain.    PLAN:  · Continue {BREAST/BOTTLE:69} feeding:   · Monitor urine output, stools and follow up in {NUMBERS   1-12:694471} {DAYS, WEEKS, MONTHS:215964} for ***.  · Indications to call or return including poor PO intake, excessive fussiness or sleepiness, vomiting, fever, etc. were discussed in detail.   · Return to clinic sooner if  SOB, CHF exacerbation needed.  Indications to call/return sooner were discussed in detail.     Carmen Phillips MD  2023  9:00 AM

## 2023-06-15 NOTE — PHYSICAL THERAPY INITIAL EVALUATION ADULT - REHAB POTENTIAL, PT EVAL
Acupuncture Evaluation Note     Name: Bakari Diaz Jr.  Clinic Number: [id number]    Traditional Chinese Medicine (TCM) Diagnosis: Qi Stagnation and Blood Stasis  Medical Diagnosis: chronic low back pain    Evaluation Date: 6/15/2023    Visit #/Visits authorized: 1/ 12     Precautions: Standard    Subjective     Chief Concern: chronic low back pain  Low back pain for years, in his 30s had surgery for a bulging disc (disc was shaved), lumbar vertebra.  Acute pain from last week, lifted something heavy and pulled a muscle, had difficulty sleeping with sharp pain in his lower back. Pain not so bad the last few days.  Last week pain radiated down one leg, but it no longer radiates.  Today back pain he can manage by restricting his movement.  Pain worse with sitting for long periods of time. Is able to stand at work with a standing desk.  Today, with certain movements will feel the stabbing pain.  Today pain is about '4-5'  Typically Rex will recover from a muscle strain within 2 weeks or so, and he pulls his back out every 2-3 months.  When not in a flare, he still has pain off and on during the day, and is careful about movements.    No history of trauma, car accident or falls  Work - .    Medical necessity is demonstrated by the following IMPAIRMENTS: Medical Necessity: Decreased quality of life       HEENT:  Occasional headaches (1-2 x/month)    Chest:  history of asthma but not current (due to allergies?); history of afibrillation.    Digestion:    Taste/Appetite: appetite, too good, eats regularly, eats a range of foods, occasional acid reflux; no diarrhea or constipation    Sleep:   Sleep disturbed by pain, turns a lot to get comfortable, worse with the strained back.    Energy Levels:  fine    Psychological Symptoms:  not assessed    Other Symptoms:   none    Objective     Observation: good quesada    Tongue:  not assessed    Pulse:  not assessed    Treatment     Treatment Principles:  Move Qi &  Blood; stop pain    Needle Set 1 -     Acupuncture points used:   Face down -   Bilateral: BL10, BL60, BL23, BL25, Gerard Kayden, BL32, Lateral border sacrum +2, abbey on sacrum +2  Left: none  Right: abbey scar medial ankle +6  Centerline: none    Needles In: 26  Needles Out: 26  Needles W/O STIM placed:   8:35  Needles W/O STIM removed:   8:55      Other Traditional Chinese Medicine Modalities -  none    Assessment     After treatment, patient felt  less pain, he will see how he feels through the day.     Patient prognosis is Good.     Patient will continue to benefit from acupuncture treatment to address the deficits listed in the problem list box on initial evaluation, provide patient family education and to maximize pt's level of independence in the home and community environment.     Patient's spiritual, cultural and educational needs considered and pt agreeable to plan of care and goals.       Anticipated barriers to treatment:   none    Plan     Recommend 1 /week for 4 sessions then re-assess.      Recommendations:  Observe severity, location and duration of pain and report next session    Education:  Patient is aware of cumulative effect of acupuncture      good, to achieve stated therapy goals

## 2024-01-22 ENCOUNTER — INPATIENT (INPATIENT)
Facility: HOSPITAL | Age: 89
LOS: 3 days | Discharge: HOME | DRG: 689 | End: 2024-01-26
Attending: STUDENT IN AN ORGANIZED HEALTH CARE EDUCATION/TRAINING PROGRAM | Admitting: STUDENT IN AN ORGANIZED HEALTH CARE EDUCATION/TRAINING PROGRAM
Payer: MEDICARE

## 2024-01-22 VITALS
TEMPERATURE: 98 F | RESPIRATION RATE: 20 BRPM | SYSTOLIC BLOOD PRESSURE: 123 MMHG | HEART RATE: 90 BPM | OXYGEN SATURATION: 97 % | DIASTOLIC BLOOD PRESSURE: 82 MMHG

## 2024-01-22 DIAGNOSIS — I48.91 UNSPECIFIED ATRIAL FIBRILLATION: ICD-10-CM

## 2024-01-22 DIAGNOSIS — U07.1 COVID-19: ICD-10-CM

## 2024-01-22 DIAGNOSIS — R55 SYNCOPE AND COLLAPSE: ICD-10-CM

## 2024-01-22 DIAGNOSIS — I50.9 HEART FAILURE, UNSPECIFIED: ICD-10-CM

## 2024-01-22 DIAGNOSIS — E03.9 HYPOTHYROIDISM, UNSPECIFIED: ICD-10-CM

## 2024-01-22 DIAGNOSIS — Z96.649 PRESENCE OF UNSPECIFIED ARTIFICIAL HIP JOINT: Chronic | ICD-10-CM

## 2024-01-22 DIAGNOSIS — R32 UNSPECIFIED URINARY INCONTINENCE: ICD-10-CM

## 2024-01-22 DIAGNOSIS — I10 ESSENTIAL (PRIMARY) HYPERTENSION: ICD-10-CM

## 2024-01-22 DIAGNOSIS — Z29.9 ENCOUNTER FOR PROPHYLACTIC MEASURES, UNSPECIFIED: ICD-10-CM

## 2024-01-22 LAB
ALBUMIN SERPL ELPH-MCNC: 3.1 G/DL — LOW (ref 3.5–5)
ALP SERPL-CCNC: 71 U/L — SIGNIFICANT CHANGE UP (ref 40–120)
ALT FLD-CCNC: 19 U/L DA — SIGNIFICANT CHANGE UP (ref 10–60)
ANION GAP SERPL CALC-SCNC: 3 MMOL/L — LOW (ref 5–17)
APPEARANCE UR: ABNORMAL
AST SERPL-CCNC: 21 U/L — SIGNIFICANT CHANGE UP (ref 10–40)
BACTERIA # UR AUTO: ABNORMAL /HPF
BASOPHILS # BLD AUTO: 0.01 K/UL — SIGNIFICANT CHANGE UP (ref 0–0.2)
BASOPHILS NFR BLD AUTO: 0.2 % — SIGNIFICANT CHANGE UP (ref 0–2)
BILIRUB SERPL-MCNC: 0.3 MG/DL — SIGNIFICANT CHANGE UP (ref 0.2–1.2)
BILIRUB UR-MCNC: NEGATIVE — SIGNIFICANT CHANGE UP
BUN SERPL-MCNC: 18 MG/DL — SIGNIFICANT CHANGE UP (ref 7–18)
CALCIUM SERPL-MCNC: 8.2 MG/DL — LOW (ref 8.4–10.5)
CHLORIDE SERPL-SCNC: 105 MMOL/L — SIGNIFICANT CHANGE UP (ref 96–108)
CO2 SERPL-SCNC: 30 MMOL/L — SIGNIFICANT CHANGE UP (ref 22–31)
COLOR SPEC: YELLOW — SIGNIFICANT CHANGE UP
CREAT SERPL-MCNC: 0.95 MG/DL — SIGNIFICANT CHANGE UP (ref 0.5–1.3)
DIFF PNL FLD: ABNORMAL
EGFR: 56 ML/MIN/1.73M2 — LOW
EOSINOPHIL # BLD AUTO: 0 K/UL — SIGNIFICANT CHANGE UP (ref 0–0.5)
EOSINOPHIL NFR BLD AUTO: 0 % — SIGNIFICANT CHANGE UP (ref 0–6)
EPI CELLS # UR: PRESENT
GLUCOSE SERPL-MCNC: 105 MG/DL — HIGH (ref 70–99)
GLUCOSE UR QL: NEGATIVE MG/DL — SIGNIFICANT CHANGE UP
HCT VFR BLD CALC: 41.3 % — SIGNIFICANT CHANGE UP (ref 34.5–45)
HGB BLD-MCNC: 13.2 G/DL — SIGNIFICANT CHANGE UP (ref 11.5–15.5)
IMM GRANULOCYTES NFR BLD AUTO: 0.4 % — SIGNIFICANT CHANGE UP (ref 0–0.9)
KETONES UR-MCNC: ABNORMAL MG/DL
LEUKOCYTE ESTERASE UR-ACNC: NEGATIVE — SIGNIFICANT CHANGE UP
LYMPHOCYTES # BLD AUTO: 0.81 K/UL — LOW (ref 1–3.3)
LYMPHOCYTES # BLD AUTO: 15.1 % — SIGNIFICANT CHANGE UP (ref 13–44)
MAGNESIUM SERPL-MCNC: 2.1 MG/DL — SIGNIFICANT CHANGE UP (ref 1.6–2.6)
MCHC RBC-ENTMCNC: 30.1 PG — SIGNIFICANT CHANGE UP (ref 27–34)
MCHC RBC-ENTMCNC: 32 GM/DL — SIGNIFICANT CHANGE UP (ref 32–36)
MCV RBC AUTO: 94.3 FL — SIGNIFICANT CHANGE UP (ref 80–100)
MONOCYTES # BLD AUTO: 0.77 K/UL — SIGNIFICANT CHANGE UP (ref 0–0.9)
MONOCYTES NFR BLD AUTO: 14.4 % — HIGH (ref 2–14)
NEUTROPHILS # BLD AUTO: 3.75 K/UL — SIGNIFICANT CHANGE UP (ref 1.8–7.4)
NEUTROPHILS NFR BLD AUTO: 69.9 % — SIGNIFICANT CHANGE UP (ref 43–77)
NITRITE UR-MCNC: NEGATIVE — SIGNIFICANT CHANGE UP
NRBC # BLD: 0 /100 WBCS — SIGNIFICANT CHANGE UP (ref 0–0)
NT-PROBNP SERPL-SCNC: 858 PG/ML — HIGH (ref 0–450)
PH UR: 6.5 — SIGNIFICANT CHANGE UP (ref 5–8)
PLATELET # BLD AUTO: 155 K/UL — SIGNIFICANT CHANGE UP (ref 150–400)
POTASSIUM SERPL-MCNC: 3.7 MMOL/L — SIGNIFICANT CHANGE UP (ref 3.5–5.3)
POTASSIUM SERPL-SCNC: 3.7 MMOL/L — SIGNIFICANT CHANGE UP (ref 3.5–5.3)
PROT SERPL-MCNC: 6.9 G/DL — SIGNIFICANT CHANGE UP (ref 6–8.3)
PROT UR-MCNC: NEGATIVE MG/DL — SIGNIFICANT CHANGE UP
RAPID RVP RESULT: DETECTED
RBC # BLD: 4.38 M/UL — SIGNIFICANT CHANGE UP (ref 3.8–5.2)
RBC # FLD: 13.3 % — SIGNIFICANT CHANGE UP (ref 10.3–14.5)
RBC CASTS # UR COMP ASSIST: 6 /HPF — HIGH (ref 0–4)
SARS-COV-2 RNA SPEC QL NAA+PROBE: DETECTED
SODIUM SERPL-SCNC: 138 MMOL/L — SIGNIFICANT CHANGE UP (ref 135–145)
SP GR SPEC: 1.01 — SIGNIFICANT CHANGE UP (ref 1–1.03)
TROPONIN I, HIGH SENSITIVITY RESULT: 105 NG/L — HIGH
TROPONIN I, HIGH SENSITIVITY RESULT: 98.5 NG/L — HIGH
UROBILINOGEN FLD QL: 0.2 MG/DL — SIGNIFICANT CHANGE UP (ref 0.2–1)
WBC # BLD: 5.36 K/UL — SIGNIFICANT CHANGE UP (ref 3.8–10.5)
WBC # FLD AUTO: 5.36 K/UL — SIGNIFICANT CHANGE UP (ref 3.8–10.5)
WBC UR QL: 2 /HPF — SIGNIFICANT CHANGE UP (ref 0–5)

## 2024-01-22 PROCEDURE — 99285 EMERGENCY DEPT VISIT HI MDM: CPT

## 2024-01-22 PROCEDURE — 72170 X-RAY EXAM OF PELVIS: CPT | Mod: 26

## 2024-01-22 PROCEDURE — 71045 X-RAY EXAM CHEST 1 VIEW: CPT | Mod: 26

## 2024-01-22 PROCEDURE — 70450 CT HEAD/BRAIN W/O DYE: CPT | Mod: 26,MA

## 2024-01-22 PROCEDURE — 99223 1ST HOSP IP/OBS HIGH 75: CPT | Mod: GC

## 2024-01-22 RX ORDER — LEVOTHYROXINE SODIUM 125 MCG
1 TABLET ORAL
Qty: 0 | Refills: 0 | DISCHARGE

## 2024-01-22 RX ORDER — SODIUM CHLORIDE 9 MG/ML
500 INJECTION INTRAMUSCULAR; INTRAVENOUS; SUBCUTANEOUS ONCE
Refills: 0 | Status: COMPLETED | OUTPATIENT
Start: 2024-01-22 | End: 2024-01-22

## 2024-01-22 RX ORDER — ACETAMINOPHEN 500 MG
650 TABLET ORAL EVERY 6 HOURS
Refills: 0 | Status: DISCONTINUED | OUTPATIENT
Start: 2024-01-22 | End: 2024-01-26

## 2024-01-22 RX ORDER — ONDANSETRON 8 MG/1
4 TABLET, FILM COATED ORAL EVERY 8 HOURS
Refills: 0 | Status: DISCONTINUED | OUTPATIENT
Start: 2024-01-22 | End: 2024-01-26

## 2024-01-22 RX ORDER — TETRAHYDROZOLINE/POLYETHYL GLY 0.05 %-1 %
0 DROPS OPHTHALMIC (EYE)
Qty: 0 | Refills: 0 | DISCHARGE

## 2024-01-22 RX ORDER — ENOXAPARIN SODIUM 100 MG/ML
40 INJECTION SUBCUTANEOUS EVERY 24 HOURS
Refills: 0 | Status: DISCONTINUED | OUTPATIENT
Start: 2024-01-22 | End: 2024-01-26

## 2024-01-22 RX ORDER — CEFTRIAXONE 500 MG/1
1000 INJECTION, POWDER, FOR SOLUTION INTRAMUSCULAR; INTRAVENOUS ONCE
Refills: 0 | Status: COMPLETED | OUTPATIENT
Start: 2024-01-22 | End: 2024-01-22

## 2024-01-22 RX ORDER — LANOLIN ALCOHOL/MO/W.PET/CERES
3 CREAM (GRAM) TOPICAL AT BEDTIME
Refills: 0 | Status: DISCONTINUED | OUTPATIENT
Start: 2024-01-22 | End: 2024-01-26

## 2024-01-22 RX ADMIN — SODIUM CHLORIDE 500 MILLILITER(S): 9 INJECTION INTRAMUSCULAR; INTRAVENOUS; SUBCUTANEOUS at 12:55

## 2024-01-22 RX ADMIN — SODIUM CHLORIDE 500 MILLILITER(S): 9 INJECTION INTRAMUSCULAR; INTRAVENOUS; SUBCUTANEOUS at 11:55

## 2024-01-22 RX ADMIN — CEFTRIAXONE 100 MILLIGRAM(S): 500 INJECTION, POWDER, FOR SOLUTION INTRAMUSCULAR; INTRAVENOUS at 16:42

## 2024-01-22 NOTE — ED ADULT NURSE NOTE - ED STAT RN HANDOFF DETAILS
Report given to Bisiine RN, pt alert and oriented able to make all needs known, rr even and unlabored GCS 15 NAD last contact with pt

## 2024-01-22 NOTE — ED PROVIDER NOTE - CARE PLAN
Principal Discharge DX:	Syncope  Secondary Diagnosis:	COVID-19  Secondary Diagnosis:	Urinary retention   1

## 2024-01-22 NOTE — ED PROVIDER NOTE - PROGRESS NOTE DETAILS
daughter at bedside updated on results. discussed admission vs discharge and would prefer patient be admitted. given elevated trop, syncope and covid positive, will admit. daughter also concerned patient's abdomen is distended. >300cc of urine. concern for retention. will place bates. Tree Sylvester

## 2024-01-22 NOTE — H&P ADULT - PROBLEM SELECTOR PLAN 1
- Patient reports that prior to the fall, she felt dizzy, and lost consciousness. Pt reporting urinary incontinence for the past week as well.   - Vitals wnl. Labs sig for pro , trop 105 > 98.5, u/a neg, COVID positive.   - Hip Xray wnl (pending read).  - CTH- Age-appropriate involutional and ischemic gliotic changes. No hemorrhage. No change since 11/15/2020.  - EKG- sinus rhythm w/ PACs , no AFib   - s/p CTX and 500cc bolus in ED.   - Admitted for syncope w/u.   - f/u TTE, orthostatic vitals   - tele monitoring, consider pt's past cardiac hx, no longer on any medications   - Cardio consult Dr. Real

## 2024-01-22 NOTE — H&P ADULT - HISTORY OF PRESENT ILLNESS
Pt is 89-year-old female w/ PMHx of HTN, CHF, Hypothyroidism Afib (previously on xarelto), Right hip replacement 11/2020 presenting s/p fall.  Patient reports that prior to the fall, she felt dizzy, and lost consciousness.  Denies tripping and falling.  According to ED provider note, "when family heard the fall they went her to see the patient and she was conscious".    No nausea or vomiting.  Patient denies any current chest pain or dizziness.  Son reports over the past week the patient has been intermittently incontinent of urine which is not normal for her.    CTH- Age-appropriate involutional and ischemic gliotic changes. No hemorrhage. No change since 11/15/2020.    Last [facility] Admission 11/2020 - Right hip replacement 11/2020 due to femur fracture s/p mechanical fall   [facility] Admission 10/2019- Admitted to CCU for ADHF and afib w/ RVR, pericardial effusion. Patient diuresed upon admission. TTE performed on 10/31, pt found to have pericardial effusion and elevated pericardial pressures, was transferred to CCU for monitoring. Pericardial effusion improving, too small for pericardiocentesis. Found additionally to have b/l pleural effusions. Repeat TTE performed on 11/4; from: Transthoracic Echocardiogram (11.04.19 @ 10:12) Compared with echocardiogram of 10/31/2019, the pericardial effusion is smaller especially posterior and  lateral to the LV. Early diastolic collapse of the right atrium is again seen on today's study   Pt is 89-year-old female w/ PMHx of HTN, CHF, Hypothyroidism, Afib (previously on xarelto), dementia, right hip replacement 11/2020 presenting s/p fall.  Patient reports that prior to the fall, she felt dizzy, and lost consciousness.  Denies mechanical fall. Collateral history obtained from her son Guru over the phone. He stated that he did not witness the fall, and when the family heard the fall, they went her and noticed that she was completely conscious. Patient denies any current chest pain or dizziness. Son reports over the past week the patient has been intermittently incontinent of urine which is not normal for her, and she has been feeling dizzy intermittently as well. Pt's son also reports that she has had an intermittent cough, but denies fevers, chill, sob, chest pain, palpitations, n/v/diarrhea. Pt's last fall was about 1.5 months ago, minor in nature.     Of note, pt's son mentions that she is NO LONGER TAKING ANY OF MEDICATIONS. Pt's son told me that her doctors stopped all of her medications. Pt has not seen her cardiologist in a while, and saw her PCP under a year ago.     Latest Admissions:   [facility] Admission 11/2020 - Right hip replacement 11/2020 due to femur fracture s/p mechanical fall   [facility] Admission 10/2019- Admitted to CCU for ADHF and afib w/ RVR, pericardial effusion. Patient diuresed upon admission. TTE performed on 10/31, pt found to have pericardial effusion and elevated pericardial pressures, was transferred to CCU for monitoring. Pericardial effusion improving, too small for pericardiocentesis. Found additionally to have b/l pleural effusions. Repeat TTE performed on 11/4; from: Transthoracic Echocardiogram (11.04.19 @ 10:12) Compared with echocardiogram of 10/31/2019, the pericardial effusion is smaller especially posterior andlateral to the LV. 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

## 2024-01-22 NOTE — H&P ADULT - PROBLEM SELECTOR PLAN 5
- hx of afib, previously on xarelto and lopressor, no longer taking either  - EKG shows sinus rhythm with PACs   - monitor on telemetry, hold AC and rate control for now

## 2024-01-22 NOTE — H&P ADULT - PROBLEM SELECTOR PLAN 3
- COVID positive, has occasional cough but otherwise asymptomatic, not hypoxic  - symptomatic treatment w/ guaifenesin PRN

## 2024-01-22 NOTE — H&P ADULT - NSHPPHYSICALEXAM_GEN_ALL_CORE
T(C): 36.9 (01-22-24 @ 15:40), Max: 36.9 (01-22-24 @ 15:40)  HR: 100 (01-22-24 @ 15:40) (90 - 100)  BP: 147/82 (01-22-24 @ 15:40) (123/82 - 147/82)  RR: 22 (01-22-24 @ 15:40) (20 - 22)  SpO2: 94% (01-22-24 @ 15:40) (94% - 97%)    CONSTITUTIONAL: Well groomed, no apparent distress  EYES: PERRLA and symmetric, EOMI, No conjunctival or scleral injection, non-icteric  ENMT: Oral mucosa with moist membranes.   RESP: No respiratory distress, no use of accessory muscles; CTA b/l, no WRR  CV: RRR, +S1S2, no MRG; no JVD; no peripheral edema  GI: Soft, NT, ND, no rebound, no guarding; no palpable masses; no hepatosplenomegaly; no hernia palpated  MSK: Normal ROM without pain, no spinal tenderness, normal muscle strength/tone  SKIN: No rashes or ulcers noted; no subcutaneous nodules or induration palpable  NEURO: CN II-XII intact, outside of hard of hearing; sensation intact in upper and lower extremities b/l to light touch , no focal neuro deficits   PSYCH: Appropriate insight/judgment; A+O x 2, mood and affect appropriate, recent/remote memory relatively intact

## 2024-01-22 NOTE — ED ADULT NURSE NOTE - CHIEF COMPLAINT QUOTE
BIBA from home S/P unwitnessed fall, c/o dizziness, found by son, BP 84/46 on scene, received 300cc-400cc NS en route, baseline AXOX3 with periods of confusion, OOB with walker,  in triage

## 2024-01-22 NOTE — H&P ADULT - PROBLEM SELECTOR PLAN 4
- Hx of CHF, last [facility] CCU admission in 2019 detailed above, however not on any medications including lasix and lopressor  - does not seem to be in HF exacerbation  - f/u TTE, continue to monitor on tele

## 2024-01-22 NOTE — H&P ADULT - ASSESSMENT
Pt is 89-year-old female w/ PMHx of HTN, CHF, Hypothyroidism, Afib (previously on xarelto), dementia, right hip replacement 11/2020 presenting s/p fall.  Patient reports that prior to the fall, she felt dizzy, and lost consciousness. Pt reporting urinary incontinence for the past week as well. Vitals wnl. Labs sig for pro , trop 105 > 98.5, u/a neg, COVID positive. EKG- sinus rhythm w/ PACs. Hip Xray wnl (pending read). CTH neg. s/p CTX and 500cc bolus in ED. Admitted for syncope w/u.

## 2024-01-22 NOTE — ED PROVIDER NOTE - OBJECTIVE STATEMENT
89-year-old female presenting after a fall.  Patient reports she lost consciousness.  Denies tripping and falling.  When family heard the fall they wanted her to see the patient and she was conscious.  Patient not on any blood thinning medication.  No nausea or vomiting.  Patient denies any current chest pain or dizziness.  Son reports over the past week the patient has been intermittently incontinent of urine which is not normal for her. 89-year-old female, pmh of htn, CHF, presenting after a fall.  Patient reports she lost consciousness.  Denies tripping and falling.  When family heard the fall they wanted her to see the patient and she was conscious.  Patient not on any blood thinning medication.  No nausea or vomiting.  Patient denies any current chest pain or dizziness.  Son reports over the past week the patient has been intermittently incontinent of urine which is not normal for her.

## 2024-01-22 NOTE — H&P ADULT - PROBLEM SELECTOR PLAN 6
- hx of HTN previously on lopressor  - slightly hypertensive in ED systolic 147   - will monitor off BP medications since pt undergoing syncope w/u

## 2024-01-22 NOTE — ED ADULT NURSE NOTE - ED STAT RN HAND OFF
Transition of Care    Reason for Admission:  Abdominal Pain                   RUR Score:       6%              Plan for utilizing home health:   Mrs. Fabian Vásquez is willing to use home health, if needed. PCP: First and Last name:  Ana Craig MD     Name of Practice: [address] [place] [address]   Are you a current patient: Yes/No: Yes   Approximate date of last visit: 11/17/2022   Can you participate in a virtual visit with your PCP: Yes                    Current Advanced Directive/Advance Care Plan: Full Code  Mrs. Fabian Vásquez does not have an advance care plan. She declined to receive information about advance care planning. Healthcare Decision Maker:   Click here to complete Helios including selection of the Healthcare Decision Maker Relationship (ie \"Primary\")  Mrs. Sebastian Caldera healthcare decision maker is her , Maryan Mclaughlin. [phone]. Transition of Care Plan:  Mrs. Fabian Vásquez, her , and 15 yo son where seen in Mrs. Amrinda Apodaca's room. Her address and phone number were verified. She lives in a 2 story, single family residence with 4 steps to enter with her  and 3 children, 25 yo, 24 yo, and 15 yo. She does not have durable medical equipment. She is employed and does drive. She was independent with her ADLs and IADLs prior to her admission. Her pharmacy is the [facility] at [place] [id number].  She is about to afford and acquire her medications. Her  will be providing transportation at discharge. Care Management Interventions  PCP Verified by CM:  Yes  Last Visit to PCP: 11/17/22  Palliative Care Criteria Met (RRAT>21 & CHF Dx)?: No  Mode of Transport at Discharge: Self  Transition of Care Consult (CM Consult): Discharge Planning  MyChart Signup: No  Discharge Durable Medical Equipment: No  Physical Therapy Consult: No  Occupational Therapy Consult: No  Speech Therapy Consult: No  Support Systems: Spouse/Significant Other, Child(adele)  Confirm Follow Up Transport: Self  The Patient and/or Patient Representative was Provided with a Choice of Provider and Agrees with the Discharge Plan?: No  Freedom of Choice List was Provided with Basic Dialogue that Supports the Patient's Individualized Plan of Care/Goals, Treatment Preferences and Shares the Quality Data Associated with the Providers?: No  [place] Resource Information Provided?: No  Discharge Location  Patient Expects to be Discharged to[de-identified] Home    Will continue to follow for discharge planning.   Signed By: Divina Manjarrez LCSW     November 20, 2022 Handoff

## 2024-01-22 NOTE — ED ADULT TRIAGE NOTE - CHIEF COMPLAINT QUOTE
BIBA from home S/P unwitnessed fall, c/o dizziness, found by son, BP 84/46 on scene, received 300cc-400cc NS en route, baseline AXOX3 with periods of confusion, OOB with walker Vital Signs Last 24 Hrs  T(C): 37 (08 Jun 2023 23:31), Max: 37.0 (08 Jun 2023 23:16)  T(F): 98.6 (08 Jun 2023 23:31), Max: 98.6 (08 Jun 2023 23:16)  HR: 82 (09 Jun 2023 00:30) (82 - 90)  BP: 121/72 (09 Jun 2023 00:30) (121/72 - 133/84)  RR: 16 (08 Jun 2023 23:31) (16 - 16)    Assessment reveals VSS  General: Female sitting comfortably in no apparent distress  Neuro: No facial asymmetry, no slurred speech, moves all 4 extremities  Mood: Alert and lucid, appropriate mood and affect  A&Ox3  Lungs- clear bilateral  Heart- normal rate and regular rhythm  Extremities- Warm, Dry, no edema present, good pulses   Abdomen soft, NT, gravid  Cat 1 tracing, occasional ctx  Transabdominal Ultrasound-   Vaginal Exam-         PLAN: HELLP labs, Serial BPs BIBA from home S/P unwitnessed fall, c/o dizziness, found by son, BP 84/46 on scene, received 300cc-400cc NS en route, baseline AXOX3 with periods of confusion, OOB with walker,  in triage Vital Signs Last 24 Hrs  T(C): 37 (08 Jun 2023 23:31), Max: 37.0 (08 Jun 2023 23:16)  T(F): 98.6 (08 Jun 2023 23:31), Max: 98.6 (08 Jun 2023 23:16)  HR: 82 (09 Jun 2023 00:30) (82 - 90)  BP: 121/72 (09 Jun 2023 00:30) (121/72 - 133/84)  RR: 16 (08 Jun 2023 23:31) (16 - 16)    Assessment reveals VSS  General: Female sitting comfortably in no apparent distress  Neuro: No facial asymmetry, no slurred speech, moves all 4 extremities  Mood: Alert and lucid, appropriate mood and affect  A&Ox3  Lungs- clear bilateral  Heart- normal rate and regular rhythm  Extremities- Warm, Dry, no edema present, good pulses   Abdomen soft, NT, gravid  Cat 1 tracing, occasional ctx  Transabdominal Ultrasound- vtx, post placenta, nabil: 19.02, bpp8/8      PLAN: HELLP labs, Serial BPs

## 2024-01-22 NOTE — H&P ADULT - NSICDXPASTMEDICALHX_GEN_ALL_CORE_FT
PAST MEDICAL HISTORY:  Afib     CAD (coronary artery disease)     Chronic CHF     HTN (hypertension)     Hypothyroid

## 2024-01-22 NOTE — H&P ADULT - ATTENDING COMMENTS
I have seen and evaluated the patient in the ED.      She was pleasantly confused and not able to provide much history, but I was able to have a conversation with her.      All she can she report is that “I was dizzy, and then I fell.” She cannot recall when this happened. She is not dizzy now. She cannot further describe the dizziness. She also is not sure about whether she lost consciousness or not, but guesses that she “may have.”     She denies fever, chills, weight changes, chest pain, dyspnea, palpitations, abdominal pain, vomiting, dysuria, or diarrhea. She has a Garcia catheter and does “not know when that got there, ask my son Guru.” She says she has been coughing up some phlegm, cannot say for how long.      I appreciate the resident physician Dr. Sylvester obtaining collateral history from the patient’s son. She has reportedly been taken off all medication. Has a history of CHF (unknown EF), hypothyroidism, HTN, atrial fibrillation. This was not a witnessed fall per her son Guru but he heard her fall. By the time he got there (up the stairs), she was conscious. Did not seem confused or post-ictal.      He reports that she has been having urinary incontinence over the past week with “good days and bad days.”     On exam, she is resting in bed in no acute distress. She is afebrile and hemodynamically stable with normal HR and BP. She is saturating well on room air. Cardiopulmonary exam is unremarkable. Heart sounds are a bit distant but I do not appreciate any murmurs. No carotid bruits. She was in a regular rate and rhythm. Abdomen was soft and nontender. No LE edema. Garcia catheter in place draining yellow urine. No focal neurological deficits.     I have reviewed CBC, BMP, LFTs, UA. CBC within normal limits. Troponin is positive 105 -> 98. Renal function is normal. LFTs within normal limits. UA demonstrates trace ketones. BNP is not strikingly elevated at 858.      I have personally reviewed CT Head, do not appreciate any hemorrhage. Read is negative as well.      I have personally reviewed ECG, sinus rhythm.    Patient given ceftriaxone in ED along with 500 cc NS bolus.      Assessment and Plan:     Patient is a 89 year old female with PMH of CHF (unknown EF), HTN, hypothyroidism, atrial fibrillation who is admitted to the hospital for an unwitnessed fall with concern for syncope. She has additionally been found to have COVID-19, but she does not have hypoxia or any increased work of breathing. Based on historical features – which are very limited – unable to rule out more sinister causes of syncope so will admit for 24 hours of telemetry monitoring and to obtain TTE given her cardiac history and no clear prodrome. Given that she is off all of her cardiac medications and her history of atrial fibrillation, concern for arrhythmia is reasonably high.  Regarding her positive troponin, she has no active signs or symptoms of angina, suspect this is myocardial injury/demand ischemia, possibly from being hypovolemic.    Additionally, Garcia catheter was placed in ED, reportedly was retaining ~300 cc urine. Can perform trial of void on the floor. No indication to continue antibiotics for cystitis, UA unremarkable.      #Syncope  #Positive Troponin/Demand Ischemia  #CHF, unclear if diastolic or systolic dysfunction, without acute decompensation  #Urinary Retention/Incontinence s/p Garcia catheter Insertion in ED  #Hypertension, off medication  #Hypothyroidism, off medication  #Chronic Atrial Fibrillation, off medication    -admit to telemetry  -s/p 500 cc IVF; will hold any additional IVF and check orthostatic vital signs; can order more if positive orthostatic VS  -TTE  -Cardiology consult in the AM  -troponin peaked and trended down  -trial of void in AM  -defer antibiotics  -check TFTs in AM given patient is off her levothyroxine  -no indication for dexamethasone or remdesivir  -isolation precautions for COVID-19      -rest of plan per resident note

## 2024-01-22 NOTE — ED PROVIDER NOTE - CLINICAL SUMMARY MEDICAL DECISION MAKING FREE TEXT BOX
89-year-old female brought in after an unwitnessed fall.  Possible syncopal episode.  Patient currently without any focal neurological deficits and without vomiting.  Concern for vasovagal syncope as patient was found to be hypotensive when EMS arrived, UTI.  Will obtain labs, CT head and x-ray to assess for any intracranial hemorrhage or fracture.

## 2024-01-22 NOTE — H&P ADULT - PROBLEM SELECTOR PLAN 2
- urinary incontinence for 1 week  - s/p bates in the ED as she was retaining urine  - u/a negative, no concern for UTI - s/p ctx in ED   - CTH negative , no concern for NPH  - no concern for spinal cord compression   - c/w bates, uro consult outpatient, assess need for pessary or oxybutynin

## 2024-01-22 NOTE — ED ADULT NURSE NOTE - NSFALLHARMRISKINTERV_ED_ALL_ED
Assistance OOB with selected safe patient handling equipment if applicable/Communicate risk of Fall with Harm to all staff, patient, and family/Provide visual cue: red socks, yellow wristband, yellow gown, etc/Reinforce activity limits and safety measures with patient and family/Bed in lowest position, wheels locked, appropriate side rails in place/Call bell, personal items and telephone in reach/Instruct patient to call for assistance before getting out of bed/chair/stretcher/Non-slip footwear applied when patient is off stretcher/[place] to call system/Physically safe environment - no spills, clutter or unnecessary equipment/Purposeful Proactive Rounding/Room/bathroom lighting operational, light cord in reach

## 2024-01-23 DIAGNOSIS — N39.0 URINARY TRACT INFECTION, SITE NOT SPECIFIED: ICD-10-CM

## 2024-01-23 DIAGNOSIS — E87.6 HYPOKALEMIA: ICD-10-CM

## 2024-01-23 LAB
ALBUMIN SERPL ELPH-MCNC: 2.9 G/DL — LOW (ref 3.5–5)
ALP SERPL-CCNC: 65 U/L — SIGNIFICANT CHANGE UP (ref 40–120)
ALT FLD-CCNC: 22 U/L DA — SIGNIFICANT CHANGE UP (ref 10–60)
ANION GAP SERPL CALC-SCNC: 5 MMOL/L — SIGNIFICANT CHANGE UP (ref 5–17)
AST SERPL-CCNC: 26 U/L — SIGNIFICANT CHANGE UP (ref 10–40)
BASOPHILS # BLD AUTO: 0.02 K/UL — SIGNIFICANT CHANGE UP (ref 0–0.2)
BASOPHILS NFR BLD AUTO: 0.4 % — SIGNIFICANT CHANGE UP (ref 0–2)
BILIRUB SERPL-MCNC: 0.2 MG/DL — SIGNIFICANT CHANGE UP (ref 0.2–1.2)
BUN SERPL-MCNC: 19 MG/DL — HIGH (ref 7–18)
CALCIUM SERPL-MCNC: 8.8 MG/DL — SIGNIFICANT CHANGE UP (ref 8.4–10.5)
CHLORIDE SERPL-SCNC: 102 MMOL/L — SIGNIFICANT CHANGE UP (ref 96–108)
CO2 SERPL-SCNC: 29 MMOL/L — SIGNIFICANT CHANGE UP (ref 22–31)
CREAT SERPL-MCNC: 0.99 MG/DL — SIGNIFICANT CHANGE UP (ref 0.5–1.3)
EGFR: 54 ML/MIN/1.73M2 — LOW
EOSINOPHIL # BLD AUTO: 0.01 K/UL — SIGNIFICANT CHANGE UP (ref 0–0.5)
EOSINOPHIL NFR BLD AUTO: 0.2 % — SIGNIFICANT CHANGE UP (ref 0–6)
GLUCOSE SERPL-MCNC: 100 MG/DL — HIGH (ref 70–99)
HCT VFR BLD CALC: 40.2 % — SIGNIFICANT CHANGE UP (ref 34.5–45)
HGB BLD-MCNC: 12.7 G/DL — SIGNIFICANT CHANGE UP (ref 11.5–15.5)
IMM GRANULOCYTES NFR BLD AUTO: 0.2 % — SIGNIFICANT CHANGE UP (ref 0–0.9)
LYMPHOCYTES # BLD AUTO: 1.7 K/UL — SIGNIFICANT CHANGE UP (ref 1–3.3)
LYMPHOCYTES # BLD AUTO: 32.2 % — SIGNIFICANT CHANGE UP (ref 13–44)
MAGNESIUM SERPL-MCNC: 2 MG/DL — SIGNIFICANT CHANGE UP (ref 1.6–2.6)
MCHC RBC-ENTMCNC: 30.2 PG — SIGNIFICANT CHANGE UP (ref 27–34)
MCHC RBC-ENTMCNC: 31.6 GM/DL — LOW (ref 32–36)
MCV RBC AUTO: 95.5 FL — SIGNIFICANT CHANGE UP (ref 80–100)
MONOCYTES # BLD AUTO: 0.83 K/UL — SIGNIFICANT CHANGE UP (ref 0–0.9)
MONOCYTES NFR BLD AUTO: 15.7 % — HIGH (ref 2–14)
NEUTROPHILS # BLD AUTO: 2.71 K/UL — SIGNIFICANT CHANGE UP (ref 1.8–7.4)
NEUTROPHILS NFR BLD AUTO: 51.3 % — SIGNIFICANT CHANGE UP (ref 43–77)
NRBC # BLD: 0 /100 WBCS — SIGNIFICANT CHANGE UP (ref 0–0)
PHOSPHATE SERPL-MCNC: 3.2 MG/DL — SIGNIFICANT CHANGE UP (ref 2.5–4.5)
PLATELET # BLD AUTO: 161 K/UL — SIGNIFICANT CHANGE UP (ref 150–400)
POTASSIUM SERPL-MCNC: 3.3 MMOL/L — LOW (ref 3.5–5.3)
POTASSIUM SERPL-SCNC: 3.3 MMOL/L — LOW (ref 3.5–5.3)
PROT SERPL-MCNC: 6.5 G/DL — SIGNIFICANT CHANGE UP (ref 6–8.3)
RBC # BLD: 4.21 M/UL — SIGNIFICANT CHANGE UP (ref 3.8–5.2)
RBC # FLD: 13.1 % — SIGNIFICANT CHANGE UP (ref 10.3–14.5)
SODIUM SERPL-SCNC: 136 MMOL/L — SIGNIFICANT CHANGE UP (ref 135–145)
WBC # BLD: 5.28 K/UL — SIGNIFICANT CHANGE UP (ref 3.8–10.5)
WBC # FLD AUTO: 5.28 K/UL — SIGNIFICANT CHANGE UP (ref 3.8–10.5)

## 2024-01-23 RX ORDER — POTASSIUM CHLORIDE 20 MEQ
40 PACKET (EA) ORAL ONCE
Refills: 0 | Status: COMPLETED | OUTPATIENT
Start: 2024-01-23 | End: 2024-01-23

## 2024-01-23 RX ORDER — CEFTRIAXONE 500 MG/1
1000 INJECTION, POWDER, FOR SOLUTION INTRAMUSCULAR; INTRAVENOUS EVERY 24 HOURS
Refills: 0 | Status: DISCONTINUED | OUTPATIENT
Start: 2024-01-23 | End: 2024-01-24

## 2024-01-23 RX ADMIN — ENOXAPARIN SODIUM 40 MILLIGRAM(S): 100 INJECTION SUBCUTANEOUS at 05:03

## 2024-01-23 RX ADMIN — Medication 40 MILLIEQUIVALENT(S): at 09:54

## 2024-01-23 RX ADMIN — CEFTRIAXONE 100 MILLIGRAM(S): 500 INJECTION, POWDER, FOR SOLUTION INTRAMUSCULAR; INTRAVENOUS at 18:35

## 2024-01-23 NOTE — PROGRESS NOTE ADULT - ASSESSMENT
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  Patient reports that prior to the fall, she felt dizzy, and lost consciousness. Pt reporting urinary incontinence for the past week as well.  COVID positive. EKG- sinus rhythm w/ PACs. Hip Xray and CXR with no acute findings. CTH neg. Admitted for syncope w/u, now with UTI, started on abx. Also found to have elevated troponin likely demand ischemia, Cardiology Dr. Real following. During hospitalization pt had episode of urinary retention and bates was placed. will attempt TOV.

## 2024-01-23 NOTE — PROGRESS NOTE ADULT - PROBLEM SELECTOR PLAN 5
- Hx of CHF, last [facility] CCU admission in 2019 detailed above, however not on any medications including lasix and lopressor  - does not seem to be in HF exacerbation  - f/u TTE, continue to monitor on tele

## 2024-01-23 NOTE — PROGRESS NOTE ADULT - SUBJECTIVE AND OBJECTIVE BOX
Patient is a 89y old  Female who presents with a chief complaint of syncope (23 Jan 2024 18:04)    OVERNIGHT EVENTS: no acute changes.     Pt is aox2, comfortable appearing, tolerating PO, laying in bed due to weakness.     REVIEW OF SYSTEMS:  CONSTITUTIONAL: No fever, chills  ENMT:  No difficulty hearing, no change in vision  NECK: No pain or stiffness  RESPIRATORY: No cough, SOB  CARDIOVASCULAR: No chest pain, palpitations  GASTROINTESTINAL: No abdominal pain. No nausea, vomiting, or diarrhea  GENITOURINARY: No dysuria  NEUROLOGICAL: No HA  SKIN: No itching, burning, rashes, or lesions   LYMPH NODES: No enlarged glands  ENDOCRINE: No heat or cold intolerance; No hair loss  MUSCULOSKELETAL: No joint pain or swelling; No muscle, back, or extremity pain  PSYCHIATRIC: No depression, anxiety  HEME/LYMPH: No easy bruising, or bleeding gums    T(C): 36.4 (01-23-24 @ 08:25), Max: 36.9 (01-23-24 @ 03:05)  HR: 80 (01-23-24 @ 08:25) (80 - 111)  BP: 150/79 (01-23-24 @ 08:25) (106/83 - 150/79)  RR: 22 (01-23-24 @ 08:25) (22 - 24)  SpO2: 95% (01-23-24 @ 08:25) (95% - 96%)  Wt(kg): --Vital Signs Last 24 Hrs  T(C): 36.4 (23 Jan 2024 08:25), Max: 36.9 (23 Jan 2024 03:05)  T(F): 97.5 (23 Jan 2024 08:25), Max: 98.5 (23 Jan 2024 03:05)  HR: 80 (23 Jan 2024 08:25) (80 - 111)  BP: 150/79 (23 Jan 2024 08:25) (106/83 - 150/79)  BP(mean): 103 (23 Jan 2024 08:25) (103 - 103)  RR: 22 (23 Jan 2024 08:25) (22 - 24)  SpO2: 95% (23 Jan 2024 08:25) (95% - 96%)    Parameters below as of 23 Jan 2024 08:25  Patient On (Oxygen Delivery Method): room air        MEDICATIONS  (STANDING):  enoxaparin Injectable 40 milliGRAM(s) SubCutaneous every 24 hours    MEDICATIONS  (PRN):  acetaminophen     Tablet .. 650 milliGRAM(s) Oral every 6 hours PRN Temp greater or equal to 38C (100.4F), Mild Pain (1 - 3)  melatonin 3 milliGRAM(s) Oral at bedtime PRN Insomnia  ondansetron Injectable 4 milliGRAM(s) IV Push every 8 hours PRN Nausea and/or Vomiting      PHYSICAL EXAM:  GENERAL: NAD  EYES: clear conjunctiva  ENMT: Moist mucous membranes  NECK: Supple, No JVD, Normal thyroid  CHEST/LUNG: Clear to auscultation bilaterally; No rales, rhonchi, wheezing, or rubs  HEART: S1, S2, Regular rate and rhythm  ABDOMEN: Soft, Nontender, Nondistended; Bowel sounds present  : +bates with clear yellow urine  NEURO: Alert & Oriented X2 (not time)  EXTREMITIES: No LE edema, no calf tenderness  LYMPH: No lymphadenopathy noted  SKIN: No rashes or lesions    Consultant(s) Notes Reviewed:  [x ] YES  [ ] NO  Care Discussed with Consultants/Other Providers [ x] YES  [ ] NO    LABS:                        12.7   5.28  )-----------( 161      ( 23 Jan 2024 05:30 )             40.2     01-23    136  |  102  |  19<H>  ----------------------------<  100<H>  3.3<L>   |  29  |  0.99    Ca    8.8      23 Jan 2024 05:30  Phos  3.2     01-23  Mg     2.0     01-23    TPro  6.5  /  Alb  2.9<L>  /  TBili  0.2  /  DBili  x   /  AST  26  /  ALT  22  /  AlkPhos  65  01-23      CAPILLARY BLOOD GLUCOSE            Urinalysis Basic - ( 23 Jan 2024 05:30 )    Color: x / Appearance: x / SG: x / pH: x  Gluc: 100 mg/dL / Ketone: x  / Bili: x / Urobili: x   Blood: x / Protein: x / Nitrite: x   Leuk Esterase: x / RBC: x / WBC x   Sq Epi: x / Non Sq Epi: x / Bacteria: x        RADIOLOGY & ADDITIONAL TESTS:    Imaging Personally Reviewed:  [ ] YES  [ ] NO

## 2024-01-23 NOTE — PROGRESS NOTE ADULT - NS ATTEND AMEND GEN_ALL_CORE FT
#Syncope   #Acute urinary incontinence  #COVID-19  #Elevated troponin, likely demand ischemia   #CHF  #A-fib (not on AC)  #Hypokalemia  #Dementia   #Hypothyroidism   #R-hip replacement   #DVT ppx     89yF with PMH of hypothyroidism, a-fib (not on AC), CHF, dementia, and right hip replacement, who presented with acute urinary incontinence x1 week and syncopal episode. Patient has a history of not taking medication for her chronic conditions. Patient seen at bedside on 01/23, appears comfortable.     PE: as above; elderly, NAD  Labs: CBC, BMP, Mg, Phos reviewed; monitor daily       -Started on ceftriaxone for UTI, UCx positive for E. coli   -Replace potassium   -Supportive care for COVID-19, patient saturating appropriately on room air, no indication for dexamethasone and remdesivir   -TOV today   -Tele monitoring  -F/U TFTs, as patient is off her medication  -F/U urine culture final results  -F/U orthostatics, TTE

## 2024-01-23 NOTE — PROGRESS NOTE ADULT - PROBLEM SELECTOR PLAN 1
- Patient reports that prior to the fall, she felt dizzy, and lost consciousness. Pt reporting urinary incontinence for the past week as well.   - Vitals wnl. Labs sig for pro , trop 105 > 98.5, u/a neg, COVID positive.   - Hip Xray and CXR wnl   - CTH- Age-appropriate involutional and ischemic gliotic changes. No hemorrhage. No change since 11/15/2020.  - EKG- sinus rhythm w/ PACs , no AFib   - s/p CTX and 500cc bolus in ED.   - Admitted for syncope w/u.   - f/u TTE, orthostatic vitals   - f/u PT consult for deconditioning  - tele monitoring, consider pt's past cardiac hx, no longer on any medications   - Cardio Dr. Real following

## 2024-01-23 NOTE — PROGRESS NOTE ADULT - PROBLEM SELECTOR PLAN 7
- hx of HTN previously on lopressor  - slightly hypertensive /79   - will monitor off BP medications since pt undergoing syncope w/u  - continue home meds with BP parameters  - (age > 60) goal <150/90

## 2024-01-23 NOTE — CONSULT NOTE ADULT - SUBJECTIVE AND OBJECTIVE BOX
C A R D I O L O G Y  *********************    DATE OF SERVICE: 01-23-24    HISTORY OF PRESENT ILLNESS:  Pt is 89-year-old female w/ PMHx of HTN, CHF, Hypothyroidism, Afib (previously on xarelto), dementia, 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  Patient reports that prior to the fall, she felt dizzy, and lost consciousness.  Denies mechanical fall. Collateral history obtained from her son Guru over the phone. He stated that he did not witness the fall, and when the family heard the fall, they went her and noticed that she was completely conscious. Patient denies any current chest pain or dizziness. Son reports over the past week the patient has been intermittently incontinent of urine which is not normal for her, and she has been feeling dizzy intermittently as well. Pt's son also reports that she has had an intermittent cough, but denies fevers, chill, sob, chest pain, palpitations, n/v/diarrhea. Pt's last fall was about 1.5 months ago, minor in nature.     Of note, pt's son mentions that she is NO LONGER TAKING ANY OF MEDICATIONS. Pt's son told me that her doctors stopped all of her medications. Pt has not seen her cardiologist in a while, and saw her PCP under a year ago.     Latest Admissions:   [facility] Admission 11/2020 - Right hip replacement 11/2020 due to femur fracture s/p mechanical fall   [facility] Admission 10/2019- Admitted to CCU for ADHF and afib w/ RVR, pericardial effusion. Patient diuresed upon admission. TTE performed on 10/31, pt found to have pericardial effusion and elevated pericardial pressures, was transferred to CCU for monitoring. Pericardial effusion improving, too small for pericardiocentesis. Found additionally to have b/l pleural effusions. Repeat TTE performed on 11/4; from: Transthoracic Echocardiogram (11.04.19 @ 10:12) Compared with echocardiogram of 10/31/2019, the pericardial effusion is smaller especially posterior andlateral to the LV. Early diastolic collapse of the right atrium is again seen on today's study   (22 Jan 2024 18:23)      PAST MEDICAL & SURGICAL HISTORY:  Hypothyroid  HTN (hypertension)  CAD (coronary artery disease)  Chronic CHF  Afib  S/P hip replacement      MEDICATIONS:  MEDICATIONS  (STANDING):  enoxaparin Injectable 40 milliGRAM(s) SubCutaneous every 24 hours      Allergies    No Known Allergies    Intolerances        FAMILY HISTORY:    Non-contributary for premature coronary disease or sudden cardiac death    SOCIAL HISTORY:    [ X] Non-smoker  [ ] Smoker  [ ] Alcohol      REVIEW OF SYSTEMS:  [ ]chest pain  [  ]shortness of breath  [  ]palpitations  [  ]syncope  [ ]near syncope [ ]upper extremity weakness   [ ] lower extremity weakness  [  ]diplopia  [  ]altered mental status   [  ]fevers  [ ]chills [ ]nausea  [ ]vomitting  [  ]dysphagia    [ ]abdominal pain  [ ]melena  [ ]BRBPR    [  ]epistaxis  [  ]rash    [ ]lower extremity edema        [X] All others negative	  [ ] Unable to obtain      LABS:	 	    CARDIAC MARKERS:        Troponin I, High Sensitivity Result: 98.5 ng/L (01-22-24 @ 15:05)  Troponin I, High Sensitivity Result: 105.0 ng/L (01-22-24 @ 12:09)                            12.7   5.28  )-----------( 161      ( 23 Jan 2024 05:30 )             40.2     Hb Trend: 12.7<--    01-23    136  |  102  |  19<H>  ----------------------------<  100<H>  3.3<L>   |  29  |  0.99    Ca    8.8      23 Jan 2024 05:30  Phos  3.2     01-23  Mg     2.0     01-23    TPro  6.5  /  Alb  2.9<L>  /  TBili  0.2  /  DBili  x   /  AST  26  /  ALT  22  /  AlkPhos  65  01-23    Creatinine Trend: 0.99<--, 0.95<--    PHYSICAL EXAM:  T(C): 36.4 (01-23-24 @ 08:25), Max: 36.9 (01-22-24 @ 15:40)  HR: 80 (01-23-24 @ 08:25) (80 - 111)  BP: 150/79 (01-23-24 @ 08:25) (106/83 - 150/79)  RR: 22 (01-23-24 @ 08:25) (22 - 24)  SpO2: 95% (01-23-24 @ 08:25) (94% - 96%)  Wt(kg): --     I&O's Summary      Gen: Appears well in NAD  HEENT:  (-)icterus (-)pallor  CV: N S1 S2 1/6 DAWN (+)2 Pulses B/l  Resp:  Clear to ausculatation B/L, normal effort  GI: (+) BS Soft, NT, ND  Lymph:  (-)Edema, (-)obvious lymphadenopathy  Skin: Warm to touch, Normal turgor  Psych: Appropriate mood and affect        ECG:  	Sinus 97 BPM, APC, delayed r wav progression    RADIOLOGY:         CXR:       No acute finding.            ASSESSMENT/PLAN: 	89y Female PMHx of HTN, CHF, Hypothyroidism, Afib (previously on xarelto), dementia, 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 ? LOC COVID(+)    # Syncope  - narrow QRS on EKG is reassuring   - suspect this was a hemodynamically mediated event in the setting of COVID infection  - check orthostatic BP  - echo    # Covid   - supportive care per medica team    I once again thank you for allowing me to participate in the care of your patient.  If you have any questions or concerns please do not hesitate to contact me.    Edil Real MD, [facility]  BEEPER [phone]

## 2024-01-23 NOTE — PROGRESS NOTE ADULT - PROBLEM SELECTOR PLAN 2
- pt p/w urinary incontinence for 1 week  - likely due to UTI  - urine culture grew > 100k E. coli  - s/p bates in the ED as she was retaining urine  - c/w ceftriaxone 1g daily 1/22-1/24  - c/w tylenol 650 mg every 6 hours PRN for pain  - f/u TOV on 1/23  - f/u final urine culture

## 2024-01-24 LAB
-  AMOXICILLIN/CLAVULANIC ACID: SIGNIFICANT CHANGE UP
-  AMPICILLIN/SULBACTAM: SIGNIFICANT CHANGE UP
-  AMPICILLIN: SIGNIFICANT CHANGE UP
-  AZTREONAM: SIGNIFICANT CHANGE UP
-  CEFAZOLIN: SIGNIFICANT CHANGE UP
-  CEFEPIME: SIGNIFICANT CHANGE UP
-  CEFTRIAXONE: SIGNIFICANT CHANGE UP
-  CEFUROXIME: SIGNIFICANT CHANGE UP
-  CIPROFLOXACIN: SIGNIFICANT CHANGE UP
-  ERTAPENEM: SIGNIFICANT CHANGE UP
-  GENTAMICIN: SIGNIFICANT CHANGE UP
-  IMIPENEM: SIGNIFICANT CHANGE UP
-  LEVOFLOXACIN: SIGNIFICANT CHANGE UP
-  MEROPENEM: SIGNIFICANT CHANGE UP
-  NITROFURANTOIN: SIGNIFICANT CHANGE UP
-  PIPERACILLIN/TAZOBACTAM: SIGNIFICANT CHANGE UP
-  TOBRAMYCIN: SIGNIFICANT CHANGE UP
-  TRIMETHOPRIM/SULFAMETHOXAZOLE: SIGNIFICANT CHANGE UP
ANION GAP SERPL CALC-SCNC: 3 MMOL/L — LOW (ref 5–17)
BUN SERPL-MCNC: 19 MG/DL — HIGH (ref 7–18)
CALCIUM SERPL-MCNC: 8.5 MG/DL — SIGNIFICANT CHANGE UP (ref 8.4–10.5)
CHLORIDE SERPL-SCNC: 104 MMOL/L — SIGNIFICANT CHANGE UP (ref 96–108)
CO2 SERPL-SCNC: 31 MMOL/L — SIGNIFICANT CHANGE UP (ref 22–31)
CREAT SERPL-MCNC: 0.89 MG/DL — SIGNIFICANT CHANGE UP (ref 0.5–1.3)
EGFR: 61 ML/MIN/1.73M2 — SIGNIFICANT CHANGE UP
GLUCOSE SERPL-MCNC: 104 MG/DL — HIGH (ref 70–99)
METHOD TYPE: SIGNIFICANT CHANGE UP
POTASSIUM SERPL-MCNC: 4.1 MMOL/L — SIGNIFICANT CHANGE UP (ref 3.5–5.3)
POTASSIUM SERPL-SCNC: 4.1 MMOL/L — SIGNIFICANT CHANGE UP (ref 3.5–5.3)
SODIUM SERPL-SCNC: 138 MMOL/L — SIGNIFICANT CHANGE UP (ref 135–145)
T3FREE SERPL-MCNC: 1.97 PG/ML — LOW (ref 2–4.4)
T4 FREE SERPL-MCNC: 1.2 NG/DL — SIGNIFICANT CHANGE UP (ref 0.9–1.8)
TSH SERPL-MCNC: 3.77 UU/ML — SIGNIFICANT CHANGE UP (ref 0.34–4.82)

## 2024-01-24 RX ORDER — ERTAPENEM SODIUM 1 G/1
1000 INJECTION, POWDER, LYOPHILIZED, FOR SOLUTION INTRAMUSCULAR; INTRAVENOUS EVERY 24 HOURS
Refills: 0 | Status: DISCONTINUED | OUTPATIENT
Start: 2024-01-25 | End: 2024-01-26

## 2024-01-24 RX ORDER — SODIUM CHLORIDE 9 MG/ML
1000 INJECTION INTRAMUSCULAR; INTRAVENOUS; SUBCUTANEOUS
Refills: 0 | Status: DISCONTINUED | OUTPATIENT
Start: 2024-01-24 | End: 2024-01-25

## 2024-01-24 RX ORDER — ERTAPENEM SODIUM 1 G/1
1000 INJECTION, POWDER, LYOPHILIZED, FOR SOLUTION INTRAMUSCULAR; INTRAVENOUS ONCE
Refills: 0 | Status: COMPLETED | OUTPATIENT
Start: 2024-01-24 | End: 2024-01-24

## 2024-01-24 RX ORDER — ERTAPENEM SODIUM 1 G/1
INJECTION, POWDER, LYOPHILIZED, FOR SOLUTION INTRAMUSCULAR; INTRAVENOUS
Refills: 0 | Status: DISCONTINUED | OUTPATIENT
Start: 2024-01-24 | End: 2024-01-26

## 2024-01-24 RX ADMIN — ERTAPENEM SODIUM 120 MILLIGRAM(S): 1 INJECTION, POWDER, LYOPHILIZED, FOR SOLUTION INTRAMUSCULAR; INTRAVENOUS at 18:41

## 2024-01-24 RX ADMIN — ENOXAPARIN SODIUM 40 MILLIGRAM(S): 100 INJECTION SUBCUTANEOUS at 05:26

## 2024-01-24 RX ADMIN — SODIUM CHLORIDE 100 MILLILITER(S): 9 INJECTION INTRAMUSCULAR; INTRAVENOUS; SUBCUTANEOUS at 18:41

## 2024-01-24 NOTE — PROGRESS NOTE ADULT - PROBLEM SELECTOR PLAN 1
- Patient reports that prior to the fall, she felt dizzy, and lost consciousness. Pt reporting urinary incontinence for the past week as well.   - Vitals wnl. Labs sig for pro , trop 105 > 98.5, u/a neg, COVID positive.   - Hip Xray and CXR wnl   - CTH- Age-appropriate involutional and ischemic gliotic changes. No hemorrhage. No change since 11/15/2020.  - EKG- sinus rhythm w/ PACs , no AFib   - s/p CTX and 500cc bolus in ED.   - Admitted for syncope w/u.   - f/u TTE, orthostatic vitals   - f/u PT consult for deconditioning  - tele monitoring, consider pt's past cardiac hx, no longer on any medications   - Cardio Dr. Real following - Patient reports that prior to the fall, she felt dizzy, and lost consciousness.   - ucx w/ esbl ecoli  - Hip Xray and CXR wnl   - CTH- Age-appropriate involutional and ischemic gliotic changes. No hemorrhage. No change since 11/15/2020.  - EKG- sinus rhythm w/ PACs , no AFib   - s/p CTX and 500cc bolus in ED.   - f/u TTE, orthostatic vitals   - f/u PT consult   - c/w tele monitoring  - Cardio Dr. Real following

## 2024-01-24 NOTE — PROGRESS NOTE ADULT - PROBLEM SELECTOR PLAN 3
K 3.3  will add potassium 40 meq x1 dose  monitor BMP, mag serum K 3.3 (1/23/24)  s/p potassium 40 meq x1 dose  monitor BMP

## 2024-01-24 NOTE — PROGRESS NOTE ADULT - PROBLEM SELECTOR PLAN 5
- Hx of CHF, last [facility] CCU admission in 2019 detailed above, however not on any medications including lasix and lopressor  - does not seem to be in HF exacerbation  - f/u TTE, continue to monitor on tele

## 2024-01-24 NOTE — CHART NOTE - NSCHARTNOTEFT_GEN_A_CORE
Spoke to Guru ziegler, updated on clinical status, treatment plan/options and discharge plan/options, all questions answered

## 2024-01-24 NOTE — ED ADULT NURSE REASSESSMENT NOTE - NS ED NURSE REASSESS COMMENT FT1
615 am  report given by NYDIA RIVERA . pt resting now
Received handoff report from RN Elizabeth Pt A &O x3, Pt on tele monitor denies pain awaiting bed assignment
pt alert and oriented able to make all needs known, no new needs at this time GCS 15, rr even and unlabored, bed locked and in lowest positioned safety maintained comfort monitored plan of care discussed pt will be moving to holding area all questions answered, NAD
pt rcvd in bed, alert and oriented able to make all needs known, no new needs at this time GCS 15, rr even and unlabored, bates removed per order, pt educated on voiding and  bladder scans, bed locked and in lowest positioned safety maintained comfort monitored, NAD
pt resting comfortably in bed, meal tray provided. able to make all needs known, no new needs at this time GCS 15, rr even and unlabored, bed locked and in lowest positioned safety maintained comfort monitored
pt alert and oriented able to make all needs known, no new needs at this time GCS 15, rr even and unlabored, bed locked and in lowest positioned safety maintained comfort monitored
rcvd pt in bed 26, on overhead cardiac monitor, pt alert and oriented able to make all needs known, no new needs at this time GCS 15, rr even and unlabored, bed locked and in lowest positioned safety maintained comfort monitored
Received pt awake. No complaints voiced. Garcia catheter in place. On cardiac monitor. Isolation precautions in place. Awaiting bed assignment.

## 2024-01-24 NOTE — PATIENT PROFILE ADULT - FALL HARM RISK - HARM RISK INTERVENTIONS
Assistance with ambulation/Assistance OOB with selected safe patient handling equipment/Communicate Risk of Fall with Harm to all staff/Monitor for mental status changes/Monitor gait and stability/Orthostatic vital signs/Reinforce activity limits and safety measures with patient and family/Reorient to person, place and time as needed/Review medications for side effects contributing to fall risk/Sit up slowly, dangle for a short time, stand at bedside before walking/Tailored Fall Risk Interventions/Toileting schedule using arm’s reach rule for commode and bathroom/Use of alarms - bed, chair and/or voice tab/Visual Cue: Yellow wristband and red socks/Bed in lowest position, wheels locked, appropriate side rails in place/Call bell, personal items and telephone in reach/Instruct patient to call for assistance before getting out of bed or chair/Non-slip footwear when patient is out of bed/[place] to call system/Physically safe environment - no spills, clutter or unnecessary equipment/Purposeful Proactive Rounding/Room/bathroom lighting operational, light cord in reach

## 2024-01-24 NOTE — PROGRESS NOTE ADULT - ASSESSMENT
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  Patient reports that prior to the fall, she felt dizzy, and lost consciousness. Pt reporting urinary incontinence for the past week as well.  COVID positive. EKG- sinus rhythm w/ PACs. Hip Xray and CXR with no acute findings. CTH neg. Admitted for syncope w/u, now with UTI, started on abx. Also found to have elevated troponin likely demand ischemia, Cardiology Dr. Real following. During hospitalization pt had episode of urinary retention and bates was placed. will attempt TOV.  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  Patient reports that prior to the fall, she felt dizzy, and lost consciousness. Pt reporting urinary incontinence for the past week as well.  COVID positive. EKG- sinus rhythm w/ PACs. Hip Xray and CXR with no acute findings. CTH neg. Admitted for syncope w/u,and UTI, started on abx. Also found to have elevated troponin likely demand ischemia, Cardiology Dr. Real following.

## 2024-01-24 NOTE — PATIENT PROFILE ADULT - PUBLIC BENEFITS
3/5/2021      RE: Azra Tom  [address]  [place] [address]       Dear Colleague,    Thank you for the opportunity to participate in the care of your patient, Azra Tom, at the [facility] [place] [facility]. Please see a copy of my visit note below.    March 5, 2021     I had the pleasure of seeing Azra Tom  in the [facility]. 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 She was hospitalized from 1/28-2/11. She was treated with oseltamivir, and underwent endomyocardial biopsy. Her ECMO was complicated by left femoral pseudoaneurysm with infection requiring wound vac placement. She is here for follow up.  I have not seen her for a year because she moved to [place] temporarily for about 8 months however she did not like it that she would return back to [place].  She did see cardiology down there and no further medication adjustments were made at that time.  She notes that she is doing relatively well with no new issues.  Takes her medications as prescribed.  She denies any lower extremity edema dizziness lightheadedness and no further episodes of chest pain.  She does have some shortness of breath however which does not seem has worsened significantly but is noticeable.  No lower extremity edema PND or orthopnea associated.  No other issues.    PAST MEDICAL HISTORY:  Past Medical History:   Diagnosis Date     Atypical chest pain     history of atypical chest pain in 2017     Bipolar disorder (H)     diagnosed in [place], [place] in 2017     Dyslipidemia      Tobacco use      Uncomplicated asthma      FAMILY HISTORY:  -Sister did have recent Covid and with the Covid associated myocarditis.  No need for ECMO per patient.    SOCIAL HISTORY:  Social History     Socioeconomic  History     Marital status: Single     Spouse name: Not on file     Number of children: Not on file     Years of education: Not on file     Highest education level: Not on file   Occupational History     Not on file   Social Needs     Financial resource strain: Not on file     Food insecurity     Worry: Not on file     Inability: Not on file     Transportation needs     Medical: Not on file     Non-medical: Not on file   Tobacco Use     Smoking status: Former Smoker     Smokeless tobacco: Never Used   Substance and Sexual Activity     Alcohol use: Not Currently     Drug use: Not Currently     Sexual activity: Not on file   Lifestyle     Physical activity     Days per week: Not on file     Minutes per session: Not on file     Stress: Not on file   Relationships     Social connections     Talks on phone: Not on file     Gets together: Not on file     Attends Jewish service: Not on file     Active member of club or organization: Not on file     Attends meetings of clubs or organizations: Not on file     Relationship status: Not on file     Intimate partner violence     Fear of current or ex partner: Not on file     Emotionally abused: Not on file     Physically abused: Not on file     Forced sexual activity: Not on file   Other Topics Concern     Not on file   Social History Narrative     Not on file     CURRENT MEDICATIONS:  Current Outpatient Medications   Medication     acetaminophen (TYLENOL) 325 MG tablet     albuterol (PROAIR HFA/PROVENTIL HFA/VENTOLIN HFA) 108 (90 Base) MCG/ACT inhaler     carvedilol (COREG) 6.25 MG tablet     ferrous sulfate (FEROSUL) 325 (65 Fe) MG tablet     lisinopril (ZESTRIL) 5 MG tablet     norethindrone (MICRONOR) 0.35 MG tablet     No current facility-administered medications for this visit.      ROS:   Constitutional: No fever, chills, or sweats. Weight is 0 lbs 0 oz  ENT: No visual disturbance, ear ache, epistaxis, sore throat.   Allergies/Immunologic: Negative.   Respiratory: No  cough, hemoptysis.   Cardiovascular: As per HPI.   GI: No nausea, vomiting, hematemesis, melena, or hematochezia.   : No urinary frequency, dysuria, or hematuria.   Integument: Negative.   Psychiatric: Pleasant, no major depression noted  Neuro: No focal neurological deficits noted  Endocrinology: Negative.   Musculoskeletal: As per HPI.      EXAM:  General: appears comfortable, alert and oriented  Head: normocephalic, atraumatic  Eyes: anicteric sclera, EOMI , PERRL  Neck: no adenopathy  Orophyarynx: moist mucosa, no lesions noted  Heart: regular, S1/S2, no murmurs, rubs or gallop. Estimated JVP at 5 cmH2O  Lungs: CTAB, No wheezing.   Abdomen: soft, non-tender, bowel sounds present, no hepatosplenomegaly  Extremities: No LE edema today  Skin: no open lesions noted  Neuro: grossly non-focal     Labs:  Lab Results   Component Value Date    WBC 5.3 02/26/2020    HGB 12.4 02/26/2020    HCT 39.5 02/26/2020     02/26/2020     02/26/2020    POTASSIUM 4.5 02/26/2020    CHLORIDE 104 02/26/2020    CO2 27 02/26/2020    BUN 9 02/26/2020    CR 0.60 02/26/2020    GLC 75 02/26/2020    SED 78 (H) 02/03/2020    DD 10.1 (H) 02/03/2020    NTBNPI 47 02/25/2020    NTBNP 39 02/26/2020    TROPI <0.015 02/25/2020    AST 10 02/26/2020    ALT 15 02/26/2020    ALKPHOS 93 02/26/2020    BILITOTAL 0.4 02/26/2020    INR 1.20 (H) 02/08/2020     TTE 2/3/20:  Global and regional left ventricular function is normal with an EF of 55-60%.  Maximal thickness 1.3 cm in the basal anteroseptum. Normal RV size and function.  Trivial pericardial effusion.   This study was compared with the study from 1/31/2020 (TTE), 2/2/2020 ([facility] ECMO turndown): Compared with the 1/31/20 TTE, LVEF has improved markedly.  Compared with the [facility] turndown study, patient is no longer on ECMO.    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   Most recent ejection fraction is normal and she is on good medications at this time.  Again I have not seen her for a year because she temporarily moved to [place] but now she is back and would like to continue her care here.  At this time we discussed that we will repeat an echocardiogram to further evaluate the possible reasons for her shortness of breath and we will also obtain lab work that has been out for about a year.  She will come and see me in person to review the results of these.  I will see in about 3 months or sooner.  She will let us know should there be any worsening issues.     Follow up: 3 months    I appreciate the opportunity to participate in the care of Azra Tom . Please do not hesitate to contact me with any further questions.     Sincerely,   Audie Raymundo MD  [facility] Division of Cardiology         Azra is a 33 year old who is being evaluated via a billable video visit.      How would you like to obtain your AVS? Mail a copy  If the video visit is dropped, the invitation should be resent by: Text to cell phone: [phone]   Will anyone else be joining your video visit? No    Vitals - Patient Reported  Weight (Patient Reported): 96.2 kg (212 lb)  Height (Patient Reported): 163.8 cm (5' 4.5\")  BMI (Based on Pt Reported Ht/Wt): 35.83  Pain Score: No Pain (0)(no SOB)    Vitals were taken and medication reconciled.    DEEPA Weems  2:02 PM    Visit: doximity  Start: 2:05pm  End: 2:31pm      Please do not hesitate to contact me if you have any questions/concerns.     Sincerely,     Audie Raymundo MD    " no

## 2024-01-24 NOTE — PROGRESS NOTE ADULT - SUBJECTIVE AND OBJECTIVE BOX
C A R D I O L O G Y  **********************************     DATE OF SERVICE: 01-24-24    Patient denies chest pain breathing comfortable .   Review of systems otherwise (-)  	  MEDICATIONS:  MEDICATIONS  (STANDING):  cefTRIAXone   IVPB 1000 milliGRAM(s) IV Intermittent every 24 hours  enoxaparin Injectable 40 milliGRAM(s) SubCutaneous every 24 hours  sodium chloride 0.9%. 1000 milliLiter(s) (100 mL/Hr) IV Continuous <Continuous>      LABS:	 	    CARDIAC MARKERS:        Troponin I, High Sensitivity Result: 98.5 ng/L (01-22-24 @ 15:05)                              12.7   5.28  )-----------( 161      ( 23 Jan 2024 05:30 )             40.2     Hemoglobin: 12.7 g/dL (01-23 @ 05:30)  Hemoglobin: 13.2 g/dL (01-22 @ 12:09)      01-24    138  |  104  |  19<H>  ----------------------------<  104<H>  4.1   |  31  |  0.89    Ca    8.5      24 Jan 2024 05:30  Phos  3.2     01-23  Mg     2.0     01-23    TPro  6.5  /  Alb  2.9<L>  /  TBili  0.2  /  DBili  x   /  AST  26  /  ALT  22  /  AlkPhos  65  01-23    Creatinine Trend: 0.89<--, 0.99<--, 0.95<--    TSH: Thyroid Stimulating Hormone, Serum: 3.77 uU/mL (01-24 @ 05:30)        PHYSICAL EXAM:  T(C): 36.8 (01-24-24 @ 08:05), Max: 36.8 (01-24-24 @ 08:05)  HR: 111 (01-24-24 @ 08:05) (90 - 113)  BP: 146/89 (01-24-24 @ 08:05) (128/78 - 146/89)  RR: 22 (01-24-24 @ 08:05) (20 - 24)  SpO2: 98% (01-24-24 @ 08:05) (94% - 98%)  Wt(kg): --  I&O's Summary    HEENT:  (-)icterus (-)pallor  CV: N S1 S2 1/6 DAWN (+)2 Pulses B/l  Resp:  Clear to ausculatation B/L, normal effort  GI: (+) BS Soft, NT, ND  Lymph:  (-)Edema, (-)obvious lymphadenopathy  Skin: Warm to touch, Normal turgor  Psych: Appropriate mood and affect      TELEMETRY: 	  sinus with APC        ASSESSMENT/PLAN: 	89y  Female  PMHx of HTN, CHF, Hypothyroidism, Afib (previously on xarelto), dementia, 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 ? LOC COVID(+)    # Syncope  - narrow QRS on EKG is reassuring   - suspect this was a hemodynamically mediated event in the setting of COVID infection  - check orthostatic BP  - echo    # Covid   - supportive care per medica team    Edil Real MD, [facility]  BEEPER [phone]

## 2024-01-24 NOTE — PROGRESS NOTE ADULT - NS ATTEND AMEND GEN_ALL_CORE FT
#Syncope   #Acute urinary incontinence  #COVID-19  #Elevated troponin, likely demand ischemia   #CHF  #A-fib (not on AC)  #Hypokalemia  #Dementia   #Hypothyroidism   #R-hip replacement   #DVT ppx     89yF with PMH of hypothyroidism, a-fib (not on AC), CHF, dementia, and right hip replacement, who presented with acute urinary incontinence x1 week and syncopal episode. Patient has a history of not taking medication for her chronic conditions. Patient seen at bedside on 01/24, appears comfortable.     PE: as above; elderly, NAD  Labs: CBC, BMP, Mg, Phos reviewed; monitor daily     -Syncope likely in setting of acute UTI and COVID  -UCx positive for ESBL E. coli  -DC ceftriaxone, started on ertapenem; ID consulted   -Replace potassium   -Supportive care for COVID-19, patient saturating appropriately on room air, no indication for dexamethasone and remdesivir   -TOV today   -Tele monitoring  -TSH wnl  -PT eval  -F/U urine culture final results  -F/U orthostatics, TTE  -DVT ppx

## 2024-01-24 NOTE — PROGRESS NOTE ADULT - PROBLEM SELECTOR PLAN 6
- hx of afib, previously on xarelto and lopressor, no longer taking either  - EKG shows sinus rhythm with PACs   - monitor on telemetry, hold AC and rate control for now - hx of afib, previously on xarelto and lopressor, no longer taking either  - EKG shows sinus rhythm with PACs   - monitor on telemetry, hold AC and rate control   - rate controlled

## 2024-01-24 NOTE — PROGRESS NOTE ADULT - PROBLEM SELECTOR PLAN 2
- pt p/w urinary incontinence for 1 week  - likely due to UTI  - urine culture grew > 100k E. coli  - s/p bates in the ED as she was retaining urine  - c/w ceftriaxone 1g daily 1/22-1/24  - c/w tylenol 650 mg every 6 hours PRN for pain  - f/u TOV on 1/23  - f/u final urine culture - pt p/w urinary incontinence for 1 week  - urine culture grew > 100k E. coli ESBL  - s/p bates in the ED for retention  - s/p ceftriaxone --> changed to ertapenem ( ID reccs)  - c/w tylenol 650 mg every 6 hours PRN for pain  - f/u TOV on 1/23  - ID Wendy consulted

## 2024-01-24 NOTE — PROGRESS NOTE ADULT - SUBJECTIVE AND OBJECTIVE BOX
Patient is a 89y old  Female who presents with a chief complaint of syncope (24 Jan 2024 14:49)      INTERVAL HPI/OVERNIGHT EVENTS: no overnight events    I&O's Summary    Vital Signs Last 24 Hrs  T(C): 37 (24 Jan 2024 16:30), Max: 37 (24 Jan 2024 16:30)  T(F): 98.6 (24 Jan 2024 16:30), Max: 98.6 (24 Jan 2024 16:30)  HR: 81 (24 Jan 2024 16:30) (81 - 113)  BP: 130/75 (24 Jan 2024 16:30) (130/75 - 160/76)  BP(mean): --  RR: 20 (24 Jan 2024 16:30) (19 - 24)  SpO2: 97% (24 Jan 2024 16:30) (94% - 98%)    Parameters below as of 24 Jan 2024 16:30  Patient On (Oxygen Delivery Method): room air      PAST MEDICAL & SURGICAL HISTORY:  Hypothyroid      HTN (hypertension)      CAD (coronary artery disease)      Chronic CHF      Afib      S/P hip replacement          SOCIAL HISTORY  Alcohol:  Tobacco:  Illicit substance use:      FAMILY HISTORY:      LABS:                        12.7   5.28  )-----------( 161      ( 23 Jan 2024 05:30 )             40.2     01-24    138  |  104  |  19<H>  ----------------------------<  104<H>  4.1   |  31  |  0.89    Ca    8.5      24 Jan 2024 05:30  Phos  3.2     01-23  Mg     2.0     01-23    TPro  6.5  /  Alb  2.9<L>  /  TBili  0.2  /  DBili  x   /  AST  26  /  ALT  22  /  AlkPhos  65  01-23      Urinalysis Basic - ( 24 Jan 2024 05:30 )    Color: x / Appearance: x / SG: x / pH: x  Gluc: 104 mg/dL / Ketone: x  / Bili: x / Urobili: x   Blood: x / Protein: x / Nitrite: x   Leuk Esterase: x / RBC: x / WBC x   Sq Epi: x / Non Sq Epi: x / Bacteria: x      CAPILLARY BLOOD GLUCOSE            Urinalysis Basic - ( 24 Jan 2024 05:30 )    Color: x / Appearance: x / SG: x / pH: x  Gluc: 104 mg/dL / Ketone: x  / Bili: x / Urobili: x   Blood: x / Protein: x / Nitrite: x   Leuk Esterase: x / RBC: x / WBC x   Sq Epi: x / Non Sq Epi: x / Bacteria: x        MEDICATIONS  (STANDING):  cefTRIAXone   IVPB 1000 milliGRAM(s) IV Intermittent every 24 hours  enoxaparin Injectable 40 milliGRAM(s) SubCutaneous every 24 hours  sodium chloride 0.9%. 1000 milliLiter(s) (100 mL/Hr) IV Continuous <Continuous>    MEDICATIONS  (PRN):  acetaminophen     Tablet .. 650 milliGRAM(s) Oral every 6 hours PRN Temp greater or equal to 38C (100.4F), Mild Pain (1 - 3)  melatonin 3 milliGRAM(s) Oral at bedtime PRN Insomnia  ondansetron Injectable 4 milliGRAM(s) IV Push every 8 hours PRN Nausea and/or Vomiting      REVIEW OF SYSTEMS:  CONSTITUTIONAL: No fever,  EYES: No eye pain, visual disturbances  ENMT:   No sinus or throat pain  NECK: No pain   RESPIRATORY: No cough; No shortness of breath  CARDIOVASCULAR: No chest pain, palpitations  GASTROINTESTINAL: No abdominal  pain. No nausea, vomiting, No diarrhea or constipation. No melena or hematochezia.  GENITOURINARY: No dysuria, frequency, hematuri  NEUROLOGICAL: No headaches  SKIN: No rashes  MUSCULOSKELETAL: No joint pain     RADIOLOGY & ADDITIONAL TESTS:  < from: CT Head No Cont (01.22.24 @ 14:36) >    ACC: [id number] EXAM:  CT BRAIN   ORDERED BY: IVONE TORREZ     PROCEDURE DATE:  01/22/2024          INTERPRETATION:  Clinical Indication: Fall    5mm axial sections of the brain were obtained from base to vertex,   without the intravenous administration of contrast material. Coronal and   sagittal computer generated are available.    Comparison is made with the prior CT of 11/15/2020 and demonstrates no   significant interval change.    The ventricles and sulci are prominent consistent age appropriate   involutional changes. Small vessel white matter ischemic changes are   noted. There is no hemorrhage, mass, or shift of midline structures. Bone   window examination is unremarkable. There has been previous left-sided   lens surgery. Paranasal sinuses are clear.      IMPRESSION: Age-appropriate involutional and ischemic gliotic changes. No   hemorrhage. No change since 11/15/2020.    --- End of Report ---      < end of copied text >    ACC: [id number] EXAM:  XR PELVIS AP ONLY 1-2 VIEWS   ORDERED BY: IVONE TORREZ     ACC: [id number] EXAM:  XR CHEST PORTABLE URGENT 1V   ORDERED BY: WARNER CLARK     PROCEDURE DATE:  01/22/2024          INTERPRETATION:  Chest and pelvis. Patient had a fall.    AP semierect chest on January 22, 2024 at 6:19 PM.    Heart magnified by technique.    Lungs are clear. No obvious fracture or change from November 15, 2020.    Pelvis.    Right hip replacement with good alignment again noted.    There is mild hip degeneration greatest superolaterally. No fracture.    Lower lumbar facet degeneration.    No change from November 15, 2020.    IMPRESSION: No acute finding.    --- End of Report ---      Imaging Personally Reviewed:  [x ] YES  [ ] NO    Consultant(s) Notes Reviewed:  [x ] YES  [ ] NO    PHYSICAL EXAM:  GENERAL: NAD  HEAD:  Atraumatic, Normocephalic  EYES: conjunctiva and sclera clear  ENMT:Moist mucous membranes  NECK: Supple  NERVOUS SYSTEM:  Alert & Oriented X2  CHEST/LUNG: CTA bilaterally; No rales, rhonchi, wheezing, or rubs  HEART: Regular rate and rhythm  ABDOMEN: Soft, Nontender, Nondistended; Bowel sounds present  EXTREMITIES:  No clubbing, cyanosis, or edema  SKIN: No rashes     Care Collaborated Discussed with Consultants/Other Providers [x ] YES  [ ] NO

## 2024-01-24 NOTE — PROGRESS NOTE ADULT - PROBLEM SELECTOR PLAN 7
- hx of HTN previously on lopressor  - slightly hypertensive /79   - will monitor off BP medications since pt undergoing syncope w/u  - continue home meds with BP parameters  - (age > 60) goal <150/90 - hx of HTN previously on lopressor  - slightly hypertensive /79 on admission  - monitor off BP medications since pt undergoing syncope w/u  - (age > 60) goal <150/90

## 2024-01-25 DIAGNOSIS — T14.8XXA OTHER INJURY OF UNSPECIFIED BODY REGION, INITIAL ENCOUNTER: ICD-10-CM

## 2024-01-25 LAB
ANION GAP SERPL CALC-SCNC: 7 MMOL/L — SIGNIFICANT CHANGE UP (ref 5–17)
BUN SERPL-MCNC: 14 MG/DL — SIGNIFICANT CHANGE UP (ref 7–18)
CALCIUM SERPL-MCNC: 9.1 MG/DL — SIGNIFICANT CHANGE UP (ref 8.4–10.5)
CHLORIDE SERPL-SCNC: 105 MMOL/L — SIGNIFICANT CHANGE UP (ref 96–108)
CO2 SERPL-SCNC: 27 MMOL/L — SIGNIFICANT CHANGE UP (ref 22–31)
CREAT SERPL-MCNC: 0.75 MG/DL — SIGNIFICANT CHANGE UP (ref 0.5–1.3)
EGFR: 75 ML/MIN/1.73M2 — SIGNIFICANT CHANGE UP
GLUCOSE BLDC GLUCOMTR-MCNC: 95 MG/DL — SIGNIFICANT CHANGE UP (ref 70–99)
GLUCOSE SERPL-MCNC: 96 MG/DL — SIGNIFICANT CHANGE UP (ref 70–99)
HCT VFR BLD CALC: 40.8 % — SIGNIFICANT CHANGE UP (ref 34.5–45)
HGB BLD-MCNC: 12.9 G/DL — SIGNIFICANT CHANGE UP (ref 11.5–15.5)
MAGNESIUM SERPL-MCNC: 2.2 MG/DL — SIGNIFICANT CHANGE UP (ref 1.6–2.6)
MCHC RBC-ENTMCNC: 30.2 PG — SIGNIFICANT CHANGE UP (ref 27–34)
MCHC RBC-ENTMCNC: 31.6 GM/DL — LOW (ref 32–36)
MCV RBC AUTO: 95.6 FL — SIGNIFICANT CHANGE UP (ref 80–100)
NRBC # BLD: 0 /100 WBCS — SIGNIFICANT CHANGE UP (ref 0–0)
PHOSPHATE SERPL-MCNC: 2.8 MG/DL — SIGNIFICANT CHANGE UP (ref 2.5–4.5)
PLATELET # BLD AUTO: 146 K/UL — LOW (ref 150–400)
POTASSIUM SERPL-MCNC: 3.8 MMOL/L — SIGNIFICANT CHANGE UP (ref 3.5–5.3)
POTASSIUM SERPL-SCNC: 3.8 MMOL/L — SIGNIFICANT CHANGE UP (ref 3.5–5.3)
RBC # BLD: 4.27 M/UL — SIGNIFICANT CHANGE UP (ref 3.8–5.2)
RBC # FLD: 12.9 % — SIGNIFICANT CHANGE UP (ref 10.3–14.5)
SODIUM SERPL-SCNC: 139 MMOL/L — SIGNIFICANT CHANGE UP (ref 135–145)
WBC # BLD: 4.46 K/UL — SIGNIFICANT CHANGE UP (ref 3.8–10.5)
WBC # FLD AUTO: 4.46 K/UL — SIGNIFICANT CHANGE UP (ref 3.8–10.5)

## 2024-01-25 PROCEDURE — 93306 TTE W/DOPPLER COMPLETE: CPT | Mod: 26

## 2024-01-25 RX ORDER — SODIUM CHLORIDE 9 MG/ML
1000 INJECTION INTRAMUSCULAR; INTRAVENOUS; SUBCUTANEOUS
Refills: 0 | Status: DISCONTINUED | OUTPATIENT
Start: 2024-01-25 | End: 2024-01-26

## 2024-01-25 RX ADMIN — ERTAPENEM SODIUM 120 MILLIGRAM(S): 1 INJECTION, POWDER, LYOPHILIZED, FOR SOLUTION INTRAMUSCULAR; INTRAVENOUS at 18:17

## 2024-01-25 RX ADMIN — ENOXAPARIN SODIUM 40 MILLIGRAM(S): 100 INJECTION SUBCUTANEOUS at 06:41

## 2024-01-25 RX ADMIN — SODIUM CHLORIDE 50 MILLILITER(S): 9 INJECTION INTRAMUSCULAR; INTRAVENOUS; SUBCUTANEOUS at 18:16

## 2024-01-25 NOTE — ADVANCED PRACTICE NURSE CONSULT - ASSESSMENT
This is a 89yr old female patient admitted for Syncope and Collapse, presenting with the following:  -There is a Stage 1 Pressure Injury to the Bilateral Heels, as evident by non-blanchable erythema  -There is an intact DTI to the Bilateral Gluteus without drainage

## 2024-01-25 NOTE — PHYSICAL THERAPY INITIAL EVALUATION ADULT - LEVEL OF INDEPENDENCE: SIT/STAND, REHAB EVAL
due to complaints of pain on both lower extremities and feet while attempting to sit at the edge of the bed; patient was unable to move further than dangling both feet, and attempting sitting at the edge of bed/unable to perform

## 2024-01-25 NOTE — PROGRESS NOTE ADULT - SUBJECTIVE AND OBJECTIVE BOX
HPI:  Pt is 89-year-old female w/ PMHx of HTN, CHF, Hypothyroidism, Afib (previously on xarelto), dementia, right hip replacement 11/2020 presenting s/p fall.  Patient reports that prior to the fall, she felt dizzy, and lost consciousness.  Denies mechanical fall. Collateral history obtained from her son Guru over the phone. He stated that he did not witness the fall, and when the family heard the fall, they went her and noticed that she was completely conscious. Patient denies any current chest pain or dizziness. Son reports over the past week the patient has been intermittently incontinent of urine which is not normal for her, and she has been feeling dizzy intermittently as well. Pt's son also reports that she has had an intermittent cough, but denies fevers, chill, sob, chest pain, palpitations, n/v/diarrhea. Pt's last fall was about 1.5 months ago, minor in nature.     Of note, pt's son mentions that she is NO LONGER TAKING ANY OF MEDICATIONS. Pt's son told me that her doctors stopped all of her medications. Pt has not seen her cardiologist in a while, and saw her PCP under a year ago.     Latest Admissions:   [facility] Admission 11/2020 - Right hip replacement 11/2020 due to femur fracture s/p mechanical fall   [facility] Admission 10/2019- Admitted to CCU for ADHF and afib w/ RVR, pericardial effusion. Patient diuresed upon admission. TTE performed on 10/31, pt found to have pericardial effusion and elevated pericardial pressures, was transferred to CCU for monitoring. Pericardial effusion improving, too small for pericardiocentesis. Found additionally to have b/l pleural effusions. Repeat TTE performed on 11/4; from: Transthoracic Echocardiogram (11.04.19 @ 10:12) Compared with echocardiogram of 10/31/2019, the pericardial effusion is smaller especially posterior andlateral to the LV. Early diastolic collapse of the right atrium is again seen on today's study   (22 Jan 2024 18:23)      OVERNIGHT EVENTS:    No new overnight events.  Seen and examined at bedside.     REVIEW OF SYSTEMS:      CONSTITUTIONAL: No fever  EYES: no acute visual disturbances  NECK: No pain or stiffness  RESPIRATORY: No cough; No shortness of breath  CARDIOVASCULAR: No chest pain, no palpitations  GASTROINTESTINAL: No pain. No nausea, vomiting or diarrhea   NEUROLOGICAL: No headache or numbness, no tremors  MUSCULOSKELETAL: No joint pain, no muscle pain  GENITOURINARY: no dysuria, no frequency, no hesitancy  PSYCHIATRY: no depression, no anxiety  ALL OTHER  ROS negative        Vital Signs Last 24 Hrs  T(C): 36.7 (25 Jan 2024 11:19), Max: 37 (24 Jan 2024 16:30)  T(F): 98.1 (25 Jan 2024 11:19), Max: 98.6 (24 Jan 2024 16:30)  HR: 80 (25 Jan 2024 11:19) (61 - 92)  BP: 110/82 (25 Jan 2024 11:19) (110/82 - 153/85)  BP(mean): 97 (25 Jan 2024 09:21) (97 - 97)  RR: 18 (25 Jan 2024 11:19) (18 - 22)  SpO2: 94% (25 Jan 2024 11:19) (94% - 97%)    Parameters below as of 25 Jan 2024 11:19  Patient On (Oxygen Delivery Method): room air        ________________________________________________  PHYSICAL EXAM:    GENERAL: NAD  HEENT: Normocephalic; conjunctivae and sclerae clear;  NECK : supple, no JVD  CHEST/LUNG: Clear to auscultation; Nonlabored  HEART: S1 S2  regular  ABDOMEN: Soft, Nontender, Nondistended; Bowel sounds present  EXTREMITIES: no cyanosis; no LE edema; no calf tenderness  NERVOUS SYSTEM:  Alert; no new deficits  SKIN: warm and dry; No new rashes or lesions    _________________________________________________  CURRENT MEDICATIONS:    MEDICATIONS  (STANDING):  enoxaparin Injectable 40 milliGRAM(s) SubCutaneous every 24 hours  ertapenem  IVPB      ertapenem  IVPB 1000 milliGRAM(s) IV Intermittent every 24 hours  sodium chloride 0.9%. 1000 milliLiter(s) (100 mL/Hr) IV Continuous <Continuous>    MEDICATIONS  (PRN):  acetaminophen     Tablet .. 650 milliGRAM(s) Oral every 6 hours PRN Temp greater or equal to 38C (100.4F), Mild Pain (1 - 3)  melatonin 3 milliGRAM(s) Oral at bedtime PRN Insomnia  ondansetron Injectable 4 milliGRAM(s) IV Push every 8 hours PRN Nausea and/or Vomiting      __________________________________________________  LABS:                          12.9   4.46  )-----------( 146      ( 25 Jan 2024 06:10 )             40.8     01-25    139  |  105  |  14  ----------------------------<  96  3.8   |  27  |  0.75    Ca    9.1      25 Jan 2024 06:10  Phos  2.8     01-25  Mg     2.2     01-25        Urinalysis Basic - ( 25 Jan 2024 06:10 )    Color: x / Appearance: x / SG: x / pH: x  Gluc: 96 mg/dL / Ketone: x  / Bili: x / Urobili: x   Blood: x / Protein: x / Nitrite: x   Leuk Esterase: x / RBC: x / WBC x   Sq Epi: x / Non Sq Epi: x / Bacteria: x      CAPILLARY BLOOD GLUCOSE          __________________________________________________  RADIOLOGY & ADDITIONAL TESTS:    Imaging Personally Reviewed:  YES    < from: CT Head No Cont (01.22.24 @ 14:36) >  IMPRESSION: Age-appropriate involutional and ischemic gliotic changes. No   hemorrhage. No change since 11/15/2020.    < end of copied text >    Consultant(s) Notes Reviewed:   YES     Plan of care was discussed with patient and /or primary care giver; all questions and concerns were addressed and care was aligned with patient's wishes.    Plan discussed with attending and consulting physicians.   HPI:  Pt is 89-year-old female w/ PMHx of HTN, CHF, Hypothyroidism, Afib (previously on xarelto), dementia, right hip replacement 11/2020 presenting s/p fall.  Patient reports that prior to the fall, she felt dizzy, and lost consciousness.  Denies mechanical fall. Collateral history obtained from her son Guru over the phone. He stated that he did not witness the fall, and when the family heard the fall, they went her and noticed that she was completely conscious. Patient denies any current chest pain or dizziness. Son reports over the past week the patient has been intermittently incontinent of urine which is not normal for her, and she has been feeling dizzy intermittently as well. Pt's son also reports that she has had an intermittent cough, but denies fevers, chill, sob, chest pain, palpitations, n/v/diarrhea. Pt's last fall was about 1.5 months ago, minor in nature.     Of note, pt's son mentions that she is NO LONGER TAKING ANY OF MEDICATIONS. Pt's son told me that her doctors stopped all of her medications. Pt has not seen her cardiologist in a while, and saw her PCP under a year ago.     Latest Admissions:   [facility] Admission 11/2020 - Right hip replacement 11/2020 due to femur fracture s/p mechanical fall   [facility] Admission 10/2019- Admitted to CCU for ADHF and afib w/ RVR, pericardial effusion. Patient diuresed upon admission. TTE performed on 10/31, pt found to have pericardial effusion and elevated pericardial pressures, was transferred to CCU for monitoring. Pericardial effusion improving, too small for pericardiocentesis. Found additionally to have b/l pleural effusions. Repeat TTE performed on 11/4; from: Transthoracic Echocardiogram (11.04.19 @ 10:12) Compared with echocardiogram of 10/31/2019, the pericardial effusion is smaller especially posterior andlateral to the LV. Early diastolic collapse of the right atrium is again seen on today's study   (22 Jan 2024 18:23)      OVERNIGHT EVENTS:    No new overnight events.  Seen and examined at bedside.     REVIEW OF SYSTEMS:      CONSTITUTIONAL: No fever  EYES: no acute visual disturbances  NECK: No pain or stiffness  RESPIRATORY: No cough; No shortness of breath  CARDIOVASCULAR: No chest pain, no palpitations  GASTROINTESTINAL: No pain. No nausea, vomiting or diarrhea   NEUROLOGICAL: No headache or numbness, no tremors  MUSCULOSKELETAL: No joint pain, no muscle pain  GENITOURINARY: no dysuria, no frequency, no hesitancy  PSYCHIATRY: no depression, no anxiety  ALL OTHER  ROS negative        Vital Signs Last 24 Hrs  T(C): 36.7 (25 Jan 2024 11:19), Max: 37 (24 Jan 2024 16:30)  T(F): 98.1 (25 Jan 2024 11:19), Max: 98.6 (24 Jan 2024 16:30)  HR: 80 (25 Jan 2024 11:19) (61 - 92)  BP: 110/82 (25 Jan 2024 11:19) (110/82 - 153/85)  BP(mean): 97 (25 Jan 2024 09:21) (97 - 97)  RR: 18 (25 Jan 2024 11:19) (18 - 22)  SpO2: 94% (25 Jan 2024 11:19) (94% - 97%)    Parameters below as of 25 Jan 2024 11:19  Patient On (Oxygen Delivery Method): room air        ________________________________________________  PHYSICAL EXAM:    GENERAL: NAD  HEENT: Normocephalic; conjunctivae and sclerae clear;  NECK : supple, no JVD  CHEST/LUNG: Clear to auscultation; Nonlabored  HEART: S1 S2  regular  ABDOMEN: Soft, Nontender, Nondistended; Bowel sounds present  EXTREMITIES: no cyanosis; no LE edema; no calf tenderness  NERVOUS SYSTEM:  Alert; no new deficits  SKIN: warm and dry; No new rashes or lesions  There is a Stage 1 Pressure Injury to the Bilateral Heels  There is an intact DTI to the Bilateral Gluteus    _________________________________________________  CURRENT MEDICATIONS:    MEDICATIONS  (STANDING):  enoxaparin Injectable 40 milliGRAM(s) SubCutaneous every 24 hours  ertapenem  IVPB      ertapenem  IVPB 1000 milliGRAM(s) IV Intermittent every 24 hours  sodium chloride 0.9%. 1000 milliLiter(s) (100 mL/Hr) IV Continuous <Continuous>    MEDICATIONS  (PRN):  acetaminophen     Tablet .. 650 milliGRAM(s) Oral every 6 hours PRN Temp greater or equal to 38C (100.4F), Mild Pain (1 - 3)  melatonin 3 milliGRAM(s) Oral at bedtime PRN Insomnia  ondansetron Injectable 4 milliGRAM(s) IV Push every 8 hours PRN Nausea and/or Vomiting      __________________________________________________  LABS:                          12.9   4.46  )-----------( 146      ( 25 Jan 2024 06:10 )             40.8     01-25    139  |  105  |  14  ----------------------------<  96  3.8   |  27  |  0.75    Ca    9.1      25 Jan 2024 06:10  Phos  2.8     01-25  Mg     2.2     01-25        Urinalysis Basic - ( 25 Jan 2024 06:10 )    Color: x / Appearance: x / SG: x / pH: x  Gluc: 96 mg/dL / Ketone: x  / Bili: x / Urobili: x   Blood: x / Protein: x / Nitrite: x   Leuk Esterase: x / RBC: x / WBC x   Sq Epi: x / Non Sq Epi: x / Bacteria: x      CAPILLARY BLOOD GLUCOSE          __________________________________________________  RADIOLOGY & ADDITIONAL TESTS:    Imaging Personally Reviewed:  YES    < from: CT Head No Cont (01.22.24 @ 14:36) >  IMPRESSION: Age-appropriate involutional and ischemic gliotic changes. No   hemorrhage. No change since 11/15/2020.    < end of copied text >    Consultant(s) Notes Reviewed:   YES     Plan of care was discussed with patient and /or primary care giver; all questions and concerns were addressed and care was aligned with patient's wishes.    Plan discussed with attending and consulting physicians.

## 2024-01-25 NOTE — PHYSICAL THERAPY INITIAL EVALUATION ADULT - SITTING BALANCE: STATIC
unable to completely assume upright sitting due to pain and discomfort on both legs and feet while dangling at the edge of bed and attempting to assume sitting at the edge of bed; patient was not receptive to attempt further due to discomfort

## 2024-01-25 NOTE — PHYSICAL THERAPY INITIAL EVALUATION ADULT - PERTINENT HX OF CURRENT PROBLEM, REHAB EVAL
admitted due to syncope; s/p fall at home;   Head CT on 1/22/24: "Age-appropriate involutional and ischemic gliotic changes. No   hemorrhage. No change since 11/15/2020"  Pelvic X-ray on 1/22/24: "No obvious fracture or change from November 15, 2020"  PMHx of HTN, CHF, Hypothyroidism, Afib (previously on xarelto), dementia, right hip replacement 11/2020

## 2024-01-25 NOTE — CONSULT NOTE ADULT - ASSESSMENT
UTI - with ESBL E coli  COVID 19 infection    Plan: Continue Invanz 1g iv daily for a total of 3-4 days   UTI - with ESBL E coli  COVID 19 infection - asymptomatic except for a slight cough.    Plan: Continue Invanz 1g iv daily for a total of 3-4 days in total  reconsult prn.

## 2024-01-25 NOTE — PROGRESS NOTE ADULT - PROBLEM SELECTOR PLAN 7
Normotensive  Not on any medications at home  Monitor BP  DASH diet Rate controlled  EKG shows sinus rhythm with PACs   Continue Lovenox

## 2024-01-25 NOTE — PROGRESS NOTE ADULT - PROBLEM SELECTOR PLAN 5
Not in Exacerbation  Not on any medications at home  Monitor fluid status  Continue supportive care There is a Stage 1 Pressure Injury to the Bilateral Heels  There is an intact DTI to the Bilateral Gluteus  WOC ARPN recs:  	-Clean all wounds with normal saline and apply skin prep to the surrounding skin  	-Apply a Foam dressing to the Coccyx area Q 72hrs PRN, for protection  	-Apply TRIAD Moisture Barrier Cream to the Bilateral Gluteal wounds b.i.d. PRN  	-Elevate/float the patients heels using heel protectors and reposition the patient Q 2hrs using wedges or pillows

## 2024-01-25 NOTE — PROGRESS NOTE ADULT - PROBLEM SELECTOR PLAN 1
CTH noted above  EKG- sinus rhythm w/ PACs , no AFib   Orthostatic +  Give fluids & repeat orthostats  F/u TTE   PT consult   Continue tele monitoring  Dr. Real, Cardiology, following  Recommendations appreciated

## 2024-01-25 NOTE — PROGRESS NOTE ADULT - ASSESSMENT
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  Patient reports that prior to the fall, she felt dizzy, and lost consciousness. Pt reporting urinary incontinence for the past week as well.  COVID positive. EKG- sinus rhythm w/ PACs. Hip Xray and CXR with no acute findings. CTH neg. Admitted for syncope w/u,and UTI, started on abx. Also found to have elevated troponin likely demand ischemia, Cardiology Dr. Real following.

## 2024-01-25 NOTE — PHYSICAL THERAPY INITIAL EVALUATION ADULT - ORIENTATION, REHAB EVAL
not accurate with exact date and time but stated "we are near the end of January"/oriented to person, place, time and situation

## 2024-01-25 NOTE — PHYSICAL THERAPY INITIAL EVALUATION ADULT - NSPTDISCHREC_GEN_A_CORE
patient is currently demonstrating limitations in mobility due to hypersensitivity to tactile stimuli on both lower extremities despite demonstrating good strength to attempt and perform mobility and ADL tasks; if pain and discomfort on both legs are addressed and it resolves, patient will have a higher likelihood of going home with home PT services. Acute Care PT will continue to monitor and follow-up for optimization while admitted. Will review and address discharge recommendations when necessary. Functional improvement is highly dependent on medical assessment and interventions at this time./Home PT

## 2024-01-25 NOTE — PROGRESS NOTE ADULT - PROBLEM SELECTOR PLAN 6
Rate controlled  EKG shows sinus rhythm with PACs   Continue Lovenox Not in Exacerbation  Not on any medications at home  Monitor fluid status  Continue supportive care

## 2024-01-25 NOTE — PROGRESS NOTE ADULT - NS ATTEND AMEND GEN_ALL_CORE FT
#Syncope   #Acute urinary incontinence  #COVID-19  #Elevated troponin, likely demand ischemia   #CHF  #A-fib (not on AC)  #Hypokalemia  #Dementia   #Hypothyroidism   #R-hip replacement   #DVT ppx     89yF with PMH of hypothyroidism, a-fib (not on AC), CHF, dementia, and right hip replacement, who presented with acute urinary incontinence x1 week and syncopal episode. Patient has a history of not taking medication for her chronic conditions. Patient seen at bedside on 01/24, appears comfortable.     PE: as above; elderly, NAD  Labs: CBC, BMP, Mg, Phos reviewed; monitor daily     -Syncope likely in setting of acute UTI and COVID  -UCx positive for ESBL E. coli; ID following  -DC ceftriaxone, started on ertapenem; ID consulted, continue abx for 3d   -Replace potassium   -Supportive care for COVID-19, patient saturating appropriately on room air, no indication for dexamethasone and remdesivir   -Orthostatic positive, will give fluid bolus  -Tele monitoring  -TSH wnl  -PT eval  -F/U urine culture final results  -F/U TTE  -DVT ppx #Syncope   #Acute urinary incontinence  #COVID-19  #Elevated troponin, likely demand ischemia   #CHF  #A-fib (not on AC)  #Hypokalemia  #Dementia   #Hypothyroidism   #R-hip replacement   #DVT ppx     89yF with PMH of hypothyroidism, a-fib (not on AC), CHF, dementia, and right hip replacement, who presented with acute urinary incontinence x1 week and syncopal episode. Patient has a history of not taking medication for her chronic conditions. Patient seen at bedside on 01/24, appears comfortable.     PE: as above; elderly, NAD  Labs: CBC, BMP, Mg, Phos reviewed; monitor daily     -Syncope likely in setting of acute UTI and COVID  -UCx positive for ESBL E. coli; ID following  -DC ceftriaxone, started on ertapenem; ID consulted, continue abx for 3d   -Replace potassium   -Supportive care for COVID-19, patient saturating appropriately on room air, no indication for dexamethasone and remdesivir   -Orthostatic positive, will give fluid bolus  -Tele monitoring  -TSH wnl  -PT eval  -F/U urine culture final results  -F/U TTE  -PT rec: home therapy  -DVT ppx

## 2024-01-25 NOTE — PHYSICAL THERAPY INITIAL EVALUATION ADULT - DIAGNOSIS, PT EVAL
syncope, s/p fall at home; Head CT revealed no hemorrhage and pelvic x-ray revealed no acute findings; difficulty performing bed mobility and unable to perform sitting, standing, transfers, or ambulation due to bilateral leg pain; hypersensitivity to tactile stimuli on both feet and legs

## 2024-01-25 NOTE — CONSULT NOTE ADULT - SUBJECTIVE AND OBJECTIVE BOX
HPI:  Pt is 89-year-old female w/ PMHx of HTN, CHF, Hypothyroidism, Afib (previously on xarelto), dementia, right hip replacement 11/2020 presenting s/p fall.  Patient reports that prior to the fall, she felt dizzy, and lost consciousness.  Denies mechanical fall. Collateral history obtained from her son Guru over the phone. He stated that he did not witness the fall, and when the family heard the fall, they went her and noticed that she was completely conscious. Patient denies any current chest pain or dizziness. Son reports over the past week the patient has been intermittently incontinent of urine which is not normal for her, and she has been feeling dizzy intermittently as well. Pt's son also reports that she has had an intermittent cough, but denies fevers, chill, sob, chest pain, palpitations, n/v/diarrhea. Pt's last fall was about 1.5 months ago, minor in nature.     Of note, pt's son mentions that she is NO LONGER TAKING ANY OF MEDICATIONS. Pt's son told me that her doctors stopped all of her medications. Pt has not seen her cardiologist in a while, and saw her PCP under a year ago.     Latest Admissions:   [facility] Admission 11/2020 - Right hip replacement 11/2020 due to femur fracture s/p mechanical fall   [facility] Admission 10/2019- Admitted to CCU for ADHF and afib w/ RVR, pericardial effusion. Patient diuresed upon admission. TTE performed on 10/31, pt found to have pericardial effusion and elevated pericardial pressures, was transferred to CCU for monitoring. Pericardial effusion improving, too small for pericardiocentesis. Found additionally to have b/l pleural effusions. Repeat TTE performed on 11/4; from: Transthoracic Echocardiogram (11.04.19 @ 10:12) Compared with echocardiogram of 10/31/2019, the pericardial effusion is smaller especially posterior andlateral to the LV. Early diastolic collapse of the right atrium is again seen on today's study   (22 Jan 2024 18:23)    REVIEW OF SYSTEMS:  [  ] Not able to elicit  General:	  Chest:	  GI:	  :  Skin:	  Musculoskeletal:	  Neuro:    PAST MEDICAL & SURGICAL HISTORY:  Hypothyroid      HTN (hypertension)      CAD (coronary artery disease)      Chronic CHF      Afib      S/P hip replacement        ALLERGIES: No Known Allergies    MEDS:  acetaminophen     Tablet .. 650 milliGRAM(s) Oral every 6 hours PRN  enoxaparin Injectable 40 milliGRAM(s) SubCutaneous every 24 hours  ertapenem  IVPB      ertapenem  IVPB 1000 milliGRAM(s) IV Intermittent every 24 hours  melatonin 3 milliGRAM(s) Oral at bedtime PRN  ondansetron Injectable 4 milliGRAM(s) IV Push every 8 hours PRN  sodium chloride 0.9%. 1000 milliLiter(s) IV Continuous <Continuous>    SOCIAL HISTORY:  Smoker:      FAMILY HISTORY:    VITALS:  Vital Signs Last 24 Hrs  T(C): 36.7 (25 Jan 2024 11:19), Max: 37 (24 Jan 2024 16:30)  T(F): 98.1 (25 Jan 2024 11:19), Max: 98.6 (24 Jan 2024 16:30)  HR: 80 (25 Jan 2024 11:19) (61 - 92)  BP: 110/82 (25 Jan 2024 11:19) (110/82 - 160/76)  BP(mean): 97 (25 Jan 2024 09:21) (97 - 97)  RR: 18 (25 Jan 2024 11:19) (18 - 22)  SpO2: 94% (25 Jan 2024 11:19) (94% - 98%)    Parameters below as of 25 Jan 2024 11:19  Patient On (Oxygen Delivery Method): room air          PHYSICAL EXAM:  Constitutional:  HEENT:  Neck:  Respiratory:  Cardiovascular:  Gastrointestinal:  Extremities:  Skin:  Ortho:  Neuro:      LABS/DIAGNOSTIC TESTS:                        12.9   4.46  )-----------( 146      ( 25 Jan 2024 06:10 )             40.8     WBC Count: 4.46 K/uL (01-25 @ 06:10)  WBC Count: 5.28 K/uL (01-23 @ 05:30)    01-25    139  |  105  |  14  ----------------------------<  96  3.8   |  27  |  0.75    Ca    9.1      25 Jan 2024 06:10  Phos  2.8     01-25  Mg     2.2     01-25      Urinalysis Basic - ( 25 Jan 2024 06:10 )    Color: x / Appearance: x / SG: x / pH: x  Gluc: 96 mg/dL / Ketone: x  / Bili: x / Urobili: x   Blood: x / Protein: x / Nitrite: x   Leuk Esterase: x / RBC: x / WBC x   Sq Epi: x / Non Sq Epi: x / Bacteria: x            ABG -     CULTURES:   Clean Catch Clean Catch (Midstream)  01-22 @ 15:20   >100,000 CFU/ml Escherichia coli ESBL  --  Escherichia coli ESBL          RADIOLOGY:  < from: Xray Chest 1 View- PORTABLE-Urgent (Xray Chest 1 View- PORTABLE-Urgent .) (01.22.24 @ 18:53) >    ACC: [id number] EXAM:  XR PELVIS AP ONLY 1-2 VIEWS   ORDERED BY: IVONE TORREZ     ACC: [id number] EXAM:  XR CHEST PORTABLE URGENT 1V   ORDERED BY: WARNER CLARK     PROCEDURE DATE:  01/22/2024          INTERPRETATION:  Chest and pelvis. Patient had a fall.    AP semierect chest on January 22, 2024 at 6:19 PM.    Heart magnified by technique.    Lungs are clear. No obvious fracture or change from November 15, 2020.    Pelvis.    Right hip replacement with good alignment again noted.    There is mild hip degeneration greatest superolaterally. No fracture.    Lower lumbar facet degeneration.    No change from November 15, 2020.    IMPRESSION: No acute finding.    --- End of Report ---            DOT DE LEON MD; Attending Radiologist  This document has been electronically signed. Jan 23 2024 11:51AM    < end of copied text >   HPI:  Pt is 89-year-old female w/ PMHx of HTN, CHF, Hypothyroidism, Afib (previously on xarelto), dementia, right hip replacement 11/2020 presenting s/p fall.  Patient reports that prior to the fall, she felt dizzy, and lost consciousness.  Denies mechanical fall. Collateral history obtained from her son Guru over the phone. He stated that he did not witness the fall, and when the family heard the fall, they went her and noticed that she was completely conscious. Patient denies any current chest pain or dizziness. Son reports over the past week the patient has been intermittently incontinent of urine which is not normal for her, and she has been feeling dizzy intermittently as well. Pt's son also reports that she has had an intermittent cough, but denies fevers, chill, sob, chest pain, palpitations, n/v/diarrhea. Pt's last fall was about 1.5 months ago, minor in nature.     Of note, pt's son mentions that she is NO LONGER TAKING ANY OF MEDICATIONS. Pt's son told me that her doctors stopped all of her medications. Pt has not seen her cardiologist in a while, and saw her PCP under a year ago.     Latest Admissions:   [facility] Admission 11/2020 - Right hip replacement 11/2020 due to femur fracture s/p mechanical fall   [facility] Admission 10/2019- Admitted to CCU for ADHF and afib w/ RVR, pericardial effusion. Patient diuresed upon admission. TTE performed on 10/31, pt found to have pericardial effusion and elevated pericardial pressures, was transferred to CCU for monitoring. Pericardial effusion improving, too small for pericardiocentesis. Found additionally to have b/l pleural effusions. Repeat TTE performed on 11/4; from: Transthoracic Echocardiogram (11.04.19 @ 10:12) Compared with echocardiogram of 10/31/2019, the pericardial effusion is smaller especially posterior andlateral to the LV. Early diastolic collapse of the right atrium is again seen on today's study   (22 Jan 2024 18:23)    History as above, patient admitted from home s/p mechanical fall.  Patient was seen laying comfortably in bed with no acute distress with son at the bedside.  As per patient, she has been incontinent for the past week, but denies any other urinary symptom at home.  She denies any fevers at home as well and remains afebrile here.  Urine culture is positive for ESBL E coli and patient was started on ertapenem.  She was also found to be covid positive, however she only complains of mild intermittent cough at this time.      REVIEW OF SYSTEMS:  [  ] Not able to elicit  General: no fevers, no chills, no malaise   Chest: mild cough +, no shortness of breath, no chest pain  GI: no nvd no abdominal pain  : incontinence  Skin: no rashes no cyanosis  Musculoskeletal: no trauma no LBP  Neuro: no ha's no dizziness     PAST MEDICAL & SURGICAL HISTORY:  Hypothyroid      HTN (hypertension)      CAD (coronary artery disease)      Chronic CHF      Afib      S/P hip replacement        ALLERGIES: No Known Allergies    MEDS:  acetaminophen     Tablet .. 650 milliGRAM(s) Oral every 6 hours PRN  enoxaparin Injectable 40 milliGRAM(s) SubCutaneous every 24 hours  ertapenem  IVPB      ertapenem  IVPB 1000 milliGRAM(s) IV Intermittent every 24 hours  melatonin 3 milliGRAM(s) Oral at bedtime PRN  ondansetron Injectable 4 milliGRAM(s) IV Push every 8 hours PRN  sodium chloride 0.9%. 1000 milliLiter(s) IV Continuous <Continuous>    SOCIAL HISTORY:  Smoker:      FAMILY HISTORY:    VITALS:  Vital Signs Last 24 Hrs  T(C): 36.7 (25 Jan 2024 11:19), Max: 37 (24 Jan 2024 16:30)  T(F): 98.1 (25 Jan 2024 11:19), Max: 98.6 (24 Jan 2024 16:30)  HR: 80 (25 Jan 2024 11:19) (61 - 92)  BP: 110/82 (25 Jan 2024 11:19) (110/82 - 160/76)  BP(mean): 97 (25 Jan 2024 09:21) (97 - 97)  RR: 18 (25 Jan 2024 11:19) (18 - 22)  SpO2: 94% (25 Jan 2024 11:19) (94% - 98%)    Parameters below as of 25 Jan 2024 11:19  Patient On (Oxygen Delivery Method): room air          PHYSICAL EXAM:  Constitutional: Well developed, well groomed, no distress  HEENT: normocephalic with moist oral mucosa  Neck: supple no LN's no JVD  Respiratory: normal, airway patent, breath sounds equal, clear to auscultate bilateraly, no rales, no rhonchi, no wheeze  Cardiovascular: Regular rate and rhythm, no murmurs  Gastrointestinal: +BS, normal, soft, nontender, nondistended, no rebound tenderness, no guarding, no rigidity, no organomegaly  Extremities: no edema, no cyanosis, no clubbing  Skin: no rashes  Ortho: no jt swelling  Neuro: AAO x 3        LABS/DIAGNOSTIC TESTS:                        12.9   4.46  )-----------( 146      ( 25 Jan 2024 06:10 )             40.8     WBC Count: 4.46 K/uL (01-25 @ 06:10)  WBC Count: 5.28 K/uL (01-23 @ 05:30)    01-25    139  |  105  |  14  ----------------------------<  96  3.8   |  27  |  0.75    Ca    9.1      25 Jan 2024 06:10  Phos  2.8     01-25  Mg     2.2     01-25      Urinalysis Basic - ( 25 Jan 2024 06:10 )    Color: x / Appearance: x / SG: x / pH: x  Gluc: 96 mg/dL / Ketone: x  / Bili: x / Urobili: x   Blood: x / Protein: x / Nitrite: x   Leuk Esterase: x / RBC: x / WBC x   Sq Epi: x / Non Sq Epi: x / Bacteria: x            ABG -     CULTURES:   Clean Catch Clean Catch (Midstream)  01-22 @ 15:20   >100,000 CFU/ml Escherichia coli ESBL  --  Escherichia coli ESBL          RADIOLOGY:  < from: Xray Chest 1 View- PORTABLE-Urgent (Xray Chest 1 View- PORTABLE-Urgent .) (01.22.24 @ 18:53) >    ACC: [id number] EXAM:  XR PELVIS AP ONLY 1-2 VIEWS   ORDERED BY: IVONE TORREZ     ACC: [id number] EXAM:  XR CHEST PORTABLE URGENT 1V   ORDERED BY: WARNER CLARK     PROCEDURE DATE:  01/22/2024          INTERPRETATION:  Chest and pelvis. Patient had a fall.    AP semierect chest on January 22, 2024 at 6:19 PM.    Heart magnified by technique.    Lungs are clear. No obvious fracture or change from November 15, 2020.    Pelvis.    Right hip replacement with good alignment again noted.    There is mild hip degeneration greatest superolaterally. No fracture.    Lower lumbar facet degeneration.    No change from November 15, 2020.    IMPRESSION: No acute finding.    --- End of Report ---            DOT DE LEON MD; Attending Radiologist  This document has been electronically signed. Jan 23 2024 11:51AM    < end of copied text >

## 2024-01-25 NOTE — ADVANCED PRACTICE NURSE CONSULT - RECOMMEDATIONS
-Clean all wounds with normal saline and apply skin prep to the surrounding skin  -Apply a Foam dressing to the Coccyx area Q 72hrs PRN, for protection  -Apply TRIAD Moisture Barrier Cream to the Bilateral Gluteal wounds b.i.d. PRN  -Elevate/float the patients heels using heel protectors and reposition the patient Q 2hrs using wedges or pillows

## 2024-01-25 NOTE — PHYSICAL THERAPY INITIAL EVALUATION ADULT - GROSSLY INTACT, SENSORY
hypersensitivity to tactile stimulation on both legs and feet; complains of 5/10 pain on both legs and feet with light touch only/Grossly Intact

## 2024-01-25 NOTE — PROGRESS NOTE ADULT - SUBJECTIVE AND OBJECTIVE BOX
C A R D I O L O G Y  **********************************     DATE OF SERVICE: 01-25-24    Patient denies chest pain or shortness of breath.   Review of symptoms otherwise negative.    acetaminophen     Tablet .. 650 milliGRAM(s) Oral every 6 hours PRN  enoxaparin Injectable 40 milliGRAM(s) SubCutaneous every 24 hours  ertapenem  IVPB      ertapenem  IVPB 1000 milliGRAM(s) IV Intermittent every 24 hours  melatonin 3 milliGRAM(s) Oral at bedtime PRN  ondansetron Injectable 4 milliGRAM(s) IV Push every 8 hours PRN  sodium chloride 0.9%. 1000 milliLiter(s) IV Continuous <Continuous>                            12.9   4.46  )-----------( 146      ( 25 Jan 2024 06:10 )             40.8       Hemoglobin: 12.9 g/dL (01-25 @ 06:10)  Hemoglobin: 12.7 g/dL (01-23 @ 05:30)  Hemoglobin: 13.2 g/dL (01-22 @ 12:09)      01-25    139  |  105  |  14  ----------------------------<  96  3.8   |  27  |  0.75    Ca    9.1      25 Jan 2024 06:10  Phos  2.8     01-25  Mg     2.2     01-25      Creatinine Trend: 0.75<--, 0.89<--, 0.99<--, 0.95<--    COAGS:       T(C): 36.7 (01-25-24 @ 11:19), Max: 37 (01-24-24 @ 16:30)  HR: 80 (01-25-24 @ 11:19) (61 - 92)  BP: 110/82 (01-25-24 @ 11:19) (110/82 - 160/76)  RR: 18 (01-25-24 @ 11:19) (18 - 22)  SpO2: 94% (01-25-24 @ 11:19) (94% - 98%)  Wt(kg): --    I&O's Summary    24 Jan 2024 07:01  -  25 Jan 2024 07:00  --------------------------------------------------------  IN: 0 mL / OUT: 100 mL / NET: -100 mL      HEENT:  (-)icterus (-)pallor  CV: N S1 S2 1/6 DAWN (+)2 Pulses B/l  Resp:  Clear to ausculatation B/L, normal effort  GI: (+) BS Soft, NT, ND  Lymph:  (-)Edema, (-)obvious lymphadenopathy  Skin: Warm to touch, Normal turgor  Psych: Appropriate mood and affect      TELEMETRY: 	  sinus with APC        ASSESSMENT/PLAN: 	89y  Female  PMHx of HTN, CHF, Hypothyroidism, Afib (previously on xarelto), dementia, 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 ? LOC COVID(+)    # Syncope  - narrow QRS on EKG is reassuring   - suspect this was a hemodynamically mediated event in the setting of COVID infection  - check orthostatic BP  - echo    # Covid   - supportive care per medical team    Edil Real MD, [facility]  BEEPER [phone]

## 2024-01-25 NOTE — PHYSICAL THERAPY INITIAL EVALUATION ADULT - GENERAL OBSERVATIONS, REHAB EVAL
awake, alert, NAD; +peripheral IV access on left forearm; minimal swelling on both legs and feet; hypersensitive to tactile stimuli on both legs and feet

## 2024-01-25 NOTE — PROGRESS NOTE ADULT - PROBLEM SELECTOR PLAN 2
Urine culture grew ESBL sensitive to Ertapenem  Continue Ertapenem  Dr. Nguyen, ID, following  Recommendations appreciated

## 2024-01-25 NOTE — PROGRESS NOTE ADULT - PROBLEM SELECTOR PLAN 4
Continue with isolation precaution  Continue with PRN O2 support  Continue with supportive care  Follow up lab results
- COVID positive, has occasional cough but otherwise asymptomatic, not hypoxic  - CXR no signs of PNA  - symptomatic treatment w/ guaifenesin PRN
- COVID positive, has occasional cough but otherwise asymptomatic, not hypoxic  - CXR no signs of PNA  - symptomatic treatment w/ guaifenesin PRN

## 2024-01-26 ENCOUNTER — TRANSCRIPTION ENCOUNTER (OUTPATIENT)
Age: 89
End: 2024-01-26

## 2024-01-26 LAB
-  AMPICILLIN: SIGNIFICANT CHANGE UP
-  CIPROFLOXACIN: SIGNIFICANT CHANGE UP
-  LEVOFLOXACIN: SIGNIFICANT CHANGE UP
-  NITROFURANTOIN: SIGNIFICANT CHANGE UP
-  TETRACYCLINE: SIGNIFICANT CHANGE UP
-  VANCOMYCIN: SIGNIFICANT CHANGE UP
CULTURE RESULTS: ABNORMAL
METHOD TYPE: SIGNIFICANT CHANGE UP
ORGANISM # SPEC MICROSCOPIC CNT: ABNORMAL
SPECIMEN SOURCE: SIGNIFICANT CHANGE UP

## 2024-01-26 PROCEDURE — 36415 COLL VENOUS BLD VENIPUNCTURE: CPT

## 2024-01-26 PROCEDURE — 83880 ASSAY OF NATRIURETIC PEPTIDE: CPT

## 2024-01-26 PROCEDURE — 82962 GLUCOSE BLOOD TEST: CPT

## 2024-01-26 PROCEDURE — 84481 FREE ASSAY (FT-3): CPT

## 2024-01-26 PROCEDURE — 97162 PT EVAL MOD COMPLEX 30 MIN: CPT

## 2024-01-26 PROCEDURE — 99238 HOSP IP/OBS DSCHRG MGMT 30/<: CPT

## 2024-01-26 PROCEDURE — 81001 URINALYSIS AUTO W/SCOPE: CPT

## 2024-01-26 PROCEDURE — 83735 ASSAY OF MAGNESIUM: CPT

## 2024-01-26 PROCEDURE — 93005 ELECTROCARDIOGRAM TRACING: CPT

## 2024-01-26 PROCEDURE — 70450 CT HEAD/BRAIN W/O DYE: CPT | Mod: MA

## 2024-01-26 PROCEDURE — 85027 COMPLETE CBC AUTOMATED: CPT

## 2024-01-26 PROCEDURE — 84484 ASSAY OF TROPONIN QUANT: CPT

## 2024-01-26 PROCEDURE — 93306 TTE W/DOPPLER COMPLETE: CPT

## 2024-01-26 PROCEDURE — 84443 ASSAY THYROID STIM HORMONE: CPT

## 2024-01-26 PROCEDURE — 80053 COMPREHEN METABOLIC PANEL: CPT

## 2024-01-26 PROCEDURE — 84100 ASSAY OF PHOSPHORUS: CPT

## 2024-01-26 PROCEDURE — 87077 CULTURE AEROBIC IDENTIFY: CPT

## 2024-01-26 PROCEDURE — 99285 EMERGENCY DEPT VISIT HI MDM: CPT | Mod: 25

## 2024-01-26 PROCEDURE — 87186 SC STD MICRODIL/AGAR DIL: CPT

## 2024-01-26 PROCEDURE — 84439 ASSAY OF FREE THYROXINE: CPT

## 2024-01-26 PROCEDURE — 0225U NFCT DS DNA&RNA 21 SARSCOV2: CPT

## 2024-01-26 PROCEDURE — 80048 BASIC METABOLIC PNL TOTAL CA: CPT

## 2024-01-26 PROCEDURE — 71045 X-RAY EXAM CHEST 1 VIEW: CPT

## 2024-01-26 PROCEDURE — 87086 URINE CULTURE/COLONY COUNT: CPT

## 2024-01-26 PROCEDURE — 72170 X-RAY EXAM OF PELVIS: CPT

## 2024-01-26 PROCEDURE — 85025 COMPLETE CBC W/AUTO DIFF WBC: CPT

## 2024-01-26 PROCEDURE — 96360 HYDRATION IV INFUSION INIT: CPT

## 2024-01-26 RX ORDER — ERTAPENEM SODIUM 1 G/1
1000 INJECTION, POWDER, LYOPHILIZED, FOR SOLUTION INTRAMUSCULAR; INTRAVENOUS EVERY 24 HOURS
Refills: 0 | Status: COMPLETED | OUTPATIENT
Start: 2024-01-26 | End: 2024-01-26

## 2024-01-26 RX ORDER — ACETAMINOPHEN 500 MG
2 TABLET ORAL
Qty: 0 | Refills: 0 | DISCHARGE
Start: 2024-01-26

## 2024-01-26 RX ADMIN — ERTAPENEM SODIUM 120 MILLIGRAM(S): 1 INJECTION, POWDER, LYOPHILIZED, FOR SOLUTION INTRAMUSCULAR; INTRAVENOUS at 17:58

## 2024-01-26 RX ADMIN — ENOXAPARIN SODIUM 40 MILLIGRAM(S): 100 INJECTION SUBCUTANEOUS at 05:16

## 2024-01-26 NOTE — DIETITIAN INITIAL EVALUATION ADULT - NSFNSPHYEXAMSKINFT_GEN_A_CORE
Pressure Injury 1: Left:, buttocks, Suspected deep tissue injury  Pressure Injury 2: Bilateral:, heel, Stage I  Pressure Injury 3: Right:, buttocks, Suspected deep tissue injury  Pressure Injury 4: none, none  Pressure Injury 5: none, none  Pressure Injury 6: none, none  Pressure Injury 7: none, none  Pressure Injury 8: none, none  Pressure Injury 9: none, none  Pressure Injury 10: none, none  Pressure Injury 11: none, none, Pressure Injury 1: Left:, buttocks, Suspected deep tissue injury  Pressure Injury 2: Bilateral:, heel, Stage I  Pressure Injury 3: Right:, buttocks, Suspected deep tissue injury  Pressure Injury 4: none, none  Pressure Injury 5: none, none  Pressure Injury 6: none, none  Pressure Injury 7: none, none  Pressure Injury 8: none, none  Pressure Injury 9: none, none  Pressure Injury 10: none, none  Pressure Injury 11: none, none Pressure Injury 1: Left:, buttocks, Suspected deep tissue injury  Pressure Injury 2: Bilateral:, heel, Stage I  Pressure Injury 3: Right:, buttocks, Suspected deep tissue injury

## 2024-01-26 NOTE — DISCHARGE NOTE PROVIDER - HOSPITAL COURSE
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  Patient reports that prior to the fall, she felt dizzy, and lost consciousness. Pt reporting urinary incontinence for the past week as well.  COVID positive. EKG- sinus rhythm w/ PACs. Hip Xray and CXR with no acute findings. CTH neg. Admitted for syncope w/u, and UTI, started on abx. Also found to have elevated troponin likely demand ischemia, Cardiology Dr. Real following. Echo with normal Lt ventricle function and acceptable EF.  Urine culture with ESBL E. Coli, ID consulted, ertapenem until -----  Pt was stable in terms of COVID infection  PT recommends home PT  Pt is hemodynamically and medically stable for discharge, thus discussed with attending and decision was made for dc home with home PT.    Please refer to daily progress and consult notes since this is a brief summary.    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  Patient reports that prior to the fall, she felt dizzy, and lost consciousness. Pt reporting urinary incontinence for the past week as well.  COVID positive. EKG- sinus rhythm w/ PACs. Hip Xray and CXR with no acute findings. CTH neg. Admitted for syncope w/u, and UTI, started on abx. Also found to have elevated troponin likely demand ischemia, Cardiology Dr. Real following. Echo with normal Lt ventricle function and acceptable EF.  Urine culture with ESBL E. Coli, ID consulted, and were treated with a course of ertapenem antibiotic.  Pt was stable in terms of COVID infection  PT recommends home PT  Pt is hemodynamically and medically stable for discharge, thus discussed with attending and decision was made for dc home with home PT.    Please refer to daily progress and consult notes since this is a brief summary.    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  Patient reports that prior to the fall, she felt dizzy, and lost consciousness. Pt reporting urinary incontinence for the past week as well.  COVID positive. EKG- sinus rhythm w/ PACs. Hip Xray and CXR with no acute findings. CTH neg. Admitted for syncope w/u, and UTI, started on abx. Also found to have elevated troponin likely demand ischemia, Cardiology Dr. Real following. Echo with normal Lt ventricle function and acceptable EF.  Urine culture with ESBL E. Coli, ID consulted, and was treated with a course of ertapenem.  Pt was stable in terms of COVID infection  PT recommends home PT  Pt is hemodynamically and medically stable for discharge, thus discussed with attending and decision was made for dc home with home PT.    Please refer to daily progress and consult notes since this is a brief summary.    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  Patient reports that prior to the fall, she felt dizzy, and lost consciousness. Pt reporting urinary incontinence for the past week as well.  COVID positive. EKG- sinus rhythm w/ PACs. Hip Xray and CXR with no acute findings. CTH neg. Admitted for syncope w/u, and UTI, started on abx. Also found to have elevated troponin likely demand ischemia, Cardiology Dr. Real following. Echo with normal Lt ventricle function and acceptable EF.  Urine culture with ESBL E. Coli, ID consulted, and was treated with a course of ertapenem x3 days.  Pt asymptomatic COVID, saturating appropriately on room air; no indication for dexamethasone or remdesivir.  PT recommends home PT  Pt is hemodynamically and medically stable for discharge, thus discussed with attending and decision was made for dc home with home PT.    Please refer to daily progress and consult notes since this is a brief summary.

## 2024-01-26 NOTE — DISCHARGE NOTE PROVIDER - CARE PROVIDER_API CALL
Plan: Will have Enedelia extract Detail Level: Zone Initiate Treatment: Triamcinolone 0.1% cream apply daily for itching Primary Care Provider,   Phone: (   )    -  Fax: (   )    -  Follow Up Time: 1 month

## 2024-01-26 NOTE — DIETITIAN INITIAL EVALUATION ADULT - PROBLEM SELECTOR PLAN 6
- hx of HTN previously on lopressor  - slightly hypertensive in ED systolic 147   - will monitor off BP medications since pt undergoing syncope w/u
unknown

## 2024-01-26 NOTE — PHARMACOTHERAPY INTERVENTION NOTE - COMMENTS
Weight check
Today is day 3 of ertapenem.    Suggest to discontinue after completing this evening's dose.

## 2024-01-26 NOTE — DISCHARGE NOTE NURSING/CASE MANAGEMENT/SOCIAL WORK - PATIENT PORTAL LINK FT
You can access the FollowMyHealth Patient Portal offered by [facility] by registering at the following website: [email or web address]. By joining [facility]’s FollowMyHealth portal, you will also be able to view your health information using other applications (apps) compatible with our system.

## 2024-01-26 NOTE — DIETITIAN INITIAL EVALUATION ADULT - PROBLEM SELECTOR PROBLEM 6
I called and discussed that we received oncotypedx results. No further action needed at this time. HTN (hypertension)

## 2024-01-26 NOTE — PROGRESS NOTE ADULT - SUBJECTIVE AND OBJECTIVE BOX
C A R D I O L O G Y  **********************************     DATE OF SERVICE: 01-26-24    Patient denies chest pain breathing comfortable   Review of symptoms otherwise negative.    acetaminophen     Tablet .. 650 milliGRAM(s) Oral every 6 hours PRN  enoxaparin Injectable 40 milliGRAM(s) SubCutaneous every 24 hours  ertapenem  IVPB      ertapenem  IVPB 1000 milliGRAM(s) IV Intermittent every 24 hours  melatonin 3 milliGRAM(s) Oral at bedtime PRN  ondansetron Injectable 4 milliGRAM(s) IV Push every 8 hours PRN  sodium chloride 0.9%. 1000 milliLiter(s) IV Continuous <Continuous>                            12.9   4.46  )-----------( 146      ( 25 Jan 2024 06:10 )             40.8       Hemoglobin: 12.9 g/dL (01-25 @ 06:10)  Hemoglobin: 12.7 g/dL (01-23 @ 05:30)  Hemoglobin: 13.2 g/dL (01-22 @ 12:09)      01-25    139  |  105  |  14  ----------------------------<  96  3.8   |  27  |  0.75    Ca    9.1      25 Jan 2024 06:10  Phos  2.8     01-25  Mg     2.2     01-25      Creatinine Trend: 0.75<--, 0.89<--, 0.99<--, 0.95<--    COAGS:           T(C): 36.6 (01-26-24 @ 11:37), Max: 37.1 (01-25-24 @ 15:32)  HR: 85 (01-26-24 @ 11:37) (69 - 99)  BP: 140/66 (01-26-24 @ 11:37) (132/84 - 140/94)  RR: 18 (01-26-24 @ 11:37) (18 - 18)  SpO2: 95% (01-26-24 @ 11:37) (94% - 96%)  Wt(kg): --    I&O's Summary    25 Jan 2024 07:01 - 26 Jan 2024 07:00  --------------------------------------------------------  IN: 0 mL / OUT: 600 mL / NET: -600 mL    26 Jan 2024 07:01  -  26 Jan 2024 15:03  --------------------------------------------------------  IN: 0 mL / OUT: 300 mL / NET: -300 mL      HEENT:  (-)icterus (-)pallor  CV: N S1 S2 1/6 DAWN (+)2 Pulses B/l  Resp:  Clear to ausculatation B/L, normal effort  GI: (+) BS Soft, NT, ND  Lymph:  (-)Edema, (-)obvious lymphadenopathy  Skin: Warm to touch, Normal turgor  Psych: Appropriate mood and affect      TELEMETRY: 	  sinus with APC        ASSESSMENT/PLAN: 	89y  Female  PMHx of HTN, CHF, Hypothyroidism, Afib (previously on xarelto), dementia, 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 ? LOC COVID(+)    # Syncope  - narrow QRS on EKG is reassuring   - suspect this was a hemodynamically mediated event in the setting of COVID infection  - check orthostatic BP  - echo with no pertinent findings  - No need for further inpatient cardiac work up.  - d/c per med    # Covid   - supportive care per medical team    Edil Real MD, [facility]  BEEPER [phone]

## 2024-01-26 NOTE — DIETITIAN INITIAL EVALUATION ADULT - PERTINENT LABORATORY DATA
01-25    139  |  105  |  14  ----------------------------<  96  3.8   |  27  |  0.75    Ca    9.1      25 Jan 2024 06:10  Phos  2.8     01-25  Mg     2.2     01-25    POCT Blood Glucose.: 95 mg/dL (01-25-24 @ 17:01)

## 2024-01-26 NOTE — DIETITIAN INITIAL EVALUATION ADULT - OTHER INFO
Pt lives home with family PTA, visited in [place] [id number], in airborne/contact isolation room, alert, oriented, weak, hard of hearing, able to obtain information, but limited; no specific food choices, unknown food allergies, unclear recent wt changes; on Soft and Bite Sized food for better tolerance, 26-50%, 51-75% intake at times per flowsheet, 25% breakfast intake today, stating "too much food"

## 2024-01-26 NOTE — DISCHARGE NOTE PROVIDER - ATTENDING DISCHARGE PHYSICAL EXAMINATION:
Constitutional/General: Well developed, vitals reviewed  CHEST: No respiratory distress, bilateral symmetrical chest rise  ABDOMEN: Nondistended, no visual masses  SKIN: No rash, warm, dry  NEURO: Alert, Cranial nerves grossly intact, moves all extremities, follows commands

## 2024-01-26 NOTE — DIETITIAN INITIAL EVALUATION ADULT - PROBLEM SELECTOR PLAN 4
- Hx of CHF, last [facility] CCU admission in 2019 detailed above, however not on any medications including lasix and lopressor  - does not seem to be in HF exacerbation  - f/u TTE, continue to monitor on tele

## 2024-01-26 NOTE — DISCHARGE NOTE NURSING/CASE MANAGEMENT/SOCIAL WORK - NSDCPEFALRISK_GEN_ALL_CORE
For information on Fall & Injury Prevention, visit: [email or web address] OR  [email or web address] OR  https://www.cdc.gov/steadi/patient.html

## 2024-01-26 NOTE — DISCHARGE NOTE PROVIDER - NSDCCPCAREPLAN_GEN_ALL_CORE_FT
PRINCIPAL DISCHARGE DIAGNOSIS  Diagnosis: Syncope  Assessment and Plan of Treatment: likely in the setting of Acute UTI with COVID infection  cardilogist followed - Echocardiogram was done and showed Left ventricular systolic function is normal with an ejection fraction of 61 %   As patient's infection started to clear after the treatment, so it is resolved now      SECONDARY DISCHARGE DIAGNOSES  Diagnosis: Acute UTI  Assessment and Plan of Treatment: patient presented to ED with complaints of urianry retention likely due to acute UTI as Urine culture  with ESBL E-Coli bacteria IV antibiotic Ertapenam started as per infectious disease doctor recommended based on senstivities       Diagnosis: COVID-19  Assessment and Plan of Treatment:     Diagnosis: Pressure ulcer of sacral region, stage 1  Assessment and Plan of Treatment: There is a Stage 1 Pressure Injury to the Bilateral Heels  wound care recommendations   	-Clean all wounds with normal saline and apply skin prep to the surrounding skin  	-Apply a Foam dressing to the Coccyx area Q 72hrs PRN, for protection  	-Apply TRIAD Moisture Barrier Cream to the Bilateral Gluteal wounds b.i.d. PRN  	-Elevate/float the patients heels using heel protectors and reposition the patient Q 2hrs using wedges or pillows.       PRINCIPAL DISCHARGE DIAGNOSIS  Diagnosis: Syncope  Assessment and Plan of Treatment: likely in the setting of Acute UTI with COVID infection  cardiologist followed - Echocardiogram was done and showed Left ventricular systolic function is normal with an ejection fraction of 61 %         SECONDARY DISCHARGE DIAGNOSES  Diagnosis: COVID-19  Assessment and Plan of Treatment: You were asymptomatic, and maintained proper oxygen saturation on room air. There was no indication for additional medications. Please take over the counter Mucinex if your cough is persistent.    Diagnosis: Acute UTI  Assessment and Plan of Treatment: patient presented to ED with complaints of urianry retention likely due to acute UTI.  Urine culture  with ESBL E-Coli, you completed 3 days of Ertapenam, per the Infectious Disease specialist.       Diagnosis: Pressure ulcer of sacral region, stage 1  Assessment and Plan of Treatment: There is a Stage 1 Pressure Injury to the Bilateral Heels  wound care recommendations   	-Clean all wounds with normal saline and apply skin prep to the surrounding skin  	-Apply a Foam dressing to the Coccyx area Q 72hrs PRN, for protection  	-Apply TRIAD Moisture Barrier Cream to the Bilateral Gluteal wounds b.i.d. PRN  	-Elevate/float the patients heels using heel protectors and reposition the patient Q 2hrs using wedges or pillows.

## 2024-01-26 NOTE — DIETITIAN INITIAL EVALUATION ADULT - FACTORS AFF FOOD INTAKE
Advanced age with acute on chronic comorbidities/Orthodoxy/ethnic/cultural/personal food preferences

## 2024-01-26 NOTE — DIETITIAN INITIAL EVALUATION ADULT - PERTINENT MEDS FT
MEDICATIONS  (STANDING):  enoxaparin Injectable 40 milliGRAM(s) SubCutaneous every 24 hours  ertapenem  IVPB      ertapenem  IVPB 1000 milliGRAM(s) IV Intermittent every 24 hours  sodium chloride 0.9%. 1000 milliLiter(s) (50 mL/Hr) IV Continuous <Continuous>    MEDICATIONS  (PRN):  acetaminophen     Tablet .. 650 milliGRAM(s) Oral every 6 hours PRN Temp greater or equal to 38C (100.4F), Mild Pain (1 - 3)  melatonin 3 milliGRAM(s) Oral at bedtime PRN Insomnia  ondansetron Injectable 4 milliGRAM(s) IV Push every 8 hours PRN Nausea and/or Vomiting

## 2024-01-27 ENCOUNTER — INPATIENT (INPATIENT)
Facility: HOSPITAL | Age: 89
LOS: 5 days | Discharge: NURSING, REHAB OR LONG-TERM CARE | End: 2024-02-02
Attending: STUDENT IN AN ORGANIZED HEALTH CARE EDUCATION/TRAINING PROGRAM | Admitting: STUDENT IN AN ORGANIZED HEALTH CARE EDUCATION/TRAINING PROGRAM
Payer: MEDICARE

## 2024-01-27 VITALS
HEART RATE: 112 BPM | SYSTOLIC BLOOD PRESSURE: 99 MMHG | RESPIRATION RATE: 20 BRPM | DIASTOLIC BLOOD PRESSURE: 53 MMHG | OXYGEN SATURATION: 95 %

## 2024-01-27 VITALS
TEMPERATURE: 97 F | OXYGEN SATURATION: 96 % | DIASTOLIC BLOOD PRESSURE: 77 MMHG | SYSTOLIC BLOOD PRESSURE: 142 MMHG | HEART RATE: 68 BPM | RESPIRATION RATE: 18 BRPM

## 2024-01-27 DIAGNOSIS — R53.83 OTHER FATIGUE: ICD-10-CM

## 2024-01-27 DIAGNOSIS — I95.9 HYPOTENSION, UNSPECIFIED: ICD-10-CM

## 2024-01-27 DIAGNOSIS — I50.9 HEART FAILURE, UNSPECIFIED: ICD-10-CM

## 2024-01-27 DIAGNOSIS — E03.9 HYPOTHYROIDISM, UNSPECIFIED: ICD-10-CM

## 2024-01-27 DIAGNOSIS — Z96.649 PRESENCE OF UNSPECIFIED ARTIFICIAL HIP JOINT: Chronic | ICD-10-CM

## 2024-01-27 DIAGNOSIS — J96.01 ACUTE RESPIRATORY FAILURE WITH HYPOXIA: ICD-10-CM

## 2024-01-27 DIAGNOSIS — Z29.9 ENCOUNTER FOR PROPHYLACTIC MEASURES, UNSPECIFIED: ICD-10-CM

## 2024-01-27 DIAGNOSIS — I48.0 PAROXYSMAL ATRIAL FIBRILLATION: ICD-10-CM

## 2024-01-27 DIAGNOSIS — U07.1 COVID-19: ICD-10-CM

## 2024-01-27 DIAGNOSIS — R09.02 HYPOXEMIA: ICD-10-CM

## 2024-01-27 DIAGNOSIS — A41.9 SEPSIS, UNSPECIFIED ORGANISM: ICD-10-CM

## 2024-01-27 PROBLEM — I10 ESSENTIAL (PRIMARY) HYPERTENSION: Chronic | Status: ACTIVE | Noted: 2024-01-22

## 2024-01-27 PROBLEM — I25.10 ATHEROSCLEROTIC HEART DISEASE OF NATIVE CORONARY ARTERY WITHOUT ANGINA PECTORIS: Chronic | Status: ACTIVE | Noted: 2024-01-22

## 2024-01-27 PROBLEM — I48.91 UNSPECIFIED ATRIAL FIBRILLATION: Chronic | Status: ACTIVE | Noted: 2024-01-22

## 2024-01-27 LAB
ANION GAP SERPL CALC-SCNC: 13 MMOL/L — SIGNIFICANT CHANGE UP (ref 7–14)
APPEARANCE UR: CLEAR — SIGNIFICANT CHANGE UP
B PERT DNA SPEC QL NAA+PROBE: SIGNIFICANT CHANGE UP
B PERT+PARAPERT DNA PNL SPEC NAA+PROBE: SIGNIFICANT CHANGE UP
BACTERIA # UR AUTO: NEGATIVE /HPF — SIGNIFICANT CHANGE UP
BASE EXCESS BLDV CALC-SCNC: -0.6 MMOL/L — SIGNIFICANT CHANGE UP (ref -2–3)
BASE EXCESS BLDV CALC-SCNC: 1.3 MMOL/L — SIGNIFICANT CHANGE UP (ref -2–3)
BASOPHILS # BLD AUTO: 0.01 K/UL — SIGNIFICANT CHANGE UP (ref 0–0.2)
BASOPHILS NFR BLD AUTO: 0.1 % — SIGNIFICANT CHANGE UP (ref 0–2)
BILIRUB UR-MCNC: NEGATIVE — SIGNIFICANT CHANGE UP
BLOOD GAS VENOUS COMPREHENSIVE RESULT: SIGNIFICANT CHANGE UP
BLOOD GAS VENOUS COMPREHENSIVE RESULT: SIGNIFICANT CHANGE UP
BORDETELLA PARAPERTUSSIS (RAPRVP): SIGNIFICANT CHANGE UP
BUN SERPL-MCNC: 16 MG/DL — SIGNIFICANT CHANGE UP (ref 7–23)
C PNEUM DNA SPEC QL NAA+PROBE: SIGNIFICANT CHANGE UP
CALCIUM SERPL-MCNC: 8.5 MG/DL — SIGNIFICANT CHANGE UP (ref 8.4–10.5)
CAST: 1 /LPF — SIGNIFICANT CHANGE UP (ref 0–4)
CHLORIDE BLDV-SCNC: 102 MMOL/L — SIGNIFICANT CHANGE UP (ref 96–108)
CHLORIDE BLDV-SCNC: 105 MMOL/L — SIGNIFICANT CHANGE UP (ref 96–108)
CHLORIDE SERPL-SCNC: 102 MMOL/L — SIGNIFICANT CHANGE UP (ref 98–107)
CO2 BLDV-SCNC: 28.6 MMOL/L — HIGH (ref 22–26)
CO2 BLDV-SCNC: 30.4 MMOL/L — HIGH (ref 22–26)
CO2 SERPL-SCNC: 24 MMOL/L — SIGNIFICANT CHANGE UP (ref 22–31)
COLOR SPEC: YELLOW — SIGNIFICANT CHANGE UP
CREAT SERPL-MCNC: 1.15 MG/DL — SIGNIFICANT CHANGE UP (ref 0.5–1.3)
DIFF PNL FLD: NEGATIVE — SIGNIFICANT CHANGE UP
EGFR: 45 ML/MIN/1.73M2 — LOW
EOSINOPHIL # BLD AUTO: 0.02 K/UL — SIGNIFICANT CHANGE UP (ref 0–0.5)
EOSINOPHIL NFR BLD AUTO: 0.2 % — SIGNIFICANT CHANGE UP (ref 0–6)
FLUAV AG NPH QL: SIGNIFICANT CHANGE UP
FLUAV SUBTYP SPEC NAA+PROBE: SIGNIFICANT CHANGE UP
FLUBV AG NPH QL: SIGNIFICANT CHANGE UP
FLUBV RNA SPEC QL NAA+PROBE: SIGNIFICANT CHANGE UP
GAS PNL BLDV: 135 MMOL/L — LOW (ref 136–145)
GAS PNL BLDV: 136 MMOL/L — SIGNIFICANT CHANGE UP (ref 136–145)
GLUCOSE BLDV-MCNC: 163 MG/DL — HIGH (ref 70–99)
GLUCOSE BLDV-MCNC: 90 MG/DL — SIGNIFICANT CHANGE UP (ref 70–99)
GLUCOSE SERPL-MCNC: 166 MG/DL — HIGH (ref 70–99)
GLUCOSE UR QL: NEGATIVE MG/DL — SIGNIFICANT CHANGE UP
HADV DNA SPEC QL NAA+PROBE: SIGNIFICANT CHANGE UP
HCO3 BLDV-SCNC: 27 MMOL/L — SIGNIFICANT CHANGE UP (ref 22–29)
HCO3 BLDV-SCNC: 29 MMOL/L — SIGNIFICANT CHANGE UP (ref 22–29)
HCOV 229E RNA SPEC QL NAA+PROBE: SIGNIFICANT CHANGE UP
HCOV HKU1 RNA SPEC QL NAA+PROBE: SIGNIFICANT CHANGE UP
HCOV NL63 RNA SPEC QL NAA+PROBE: SIGNIFICANT CHANGE UP
HCOV OC43 RNA SPEC QL NAA+PROBE: SIGNIFICANT CHANGE UP
HCT VFR BLD CALC: 43.1 % — SIGNIFICANT CHANGE UP (ref 34.5–45)
HCT VFR BLDA CALC: 37 % — SIGNIFICANT CHANGE UP (ref 34.5–46.5)
HCT VFR BLDA CALC: SIGNIFICANT CHANGE UP % (ref 34.5–46.5)
HGB BLD CALC-MCNC: 12.4 G/DL — SIGNIFICANT CHANGE UP (ref 11.7–16.1)
HGB BLD CALC-MCNC: SIGNIFICANT CHANGE UP G/DL (ref 11.7–16.1)
HGB BLD-MCNC: 14 G/DL — SIGNIFICANT CHANGE UP (ref 11.5–15.5)
HMPV RNA SPEC QL NAA+PROBE: SIGNIFICANT CHANGE UP
HPIV1 RNA SPEC QL NAA+PROBE: SIGNIFICANT CHANGE UP
HPIV2 RNA SPEC QL NAA+PROBE: SIGNIFICANT CHANGE UP
HPIV3 RNA SPEC QL NAA+PROBE: SIGNIFICANT CHANGE UP
HPIV4 RNA SPEC QL NAA+PROBE: SIGNIFICANT CHANGE UP
IANC: 8.72 K/UL — HIGH (ref 1.8–7.4)
IMM GRANULOCYTES NFR BLD AUTO: 0.4 % — SIGNIFICANT CHANGE UP (ref 0–0.9)
KETONES UR-MCNC: 15 MG/DL
LACTATE BLDV-MCNC: 1.7 MMOL/L — SIGNIFICANT CHANGE UP (ref 0.5–2)
LACTATE BLDV-MCNC: 4 MMOL/L — CRITICAL HIGH (ref 0.5–2)
LEUKOCYTE ESTERASE UR-ACNC: NEGATIVE — SIGNIFICANT CHANGE UP
LYMPHOCYTES # BLD AUTO: 1.16 K/UL — SIGNIFICANT CHANGE UP (ref 1–3.3)
LYMPHOCYTES # BLD AUTO: 10.9 % — LOW (ref 13–44)
M PNEUMO DNA SPEC QL NAA+PROBE: SIGNIFICANT CHANGE UP
MAGNESIUM SERPL-MCNC: 2.1 MG/DL — SIGNIFICANT CHANGE UP (ref 1.6–2.6)
MCHC RBC-ENTMCNC: 30.5 PG — SIGNIFICANT CHANGE UP (ref 27–34)
MCHC RBC-ENTMCNC: 32.5 GM/DL — SIGNIFICANT CHANGE UP (ref 32–36)
MCV RBC AUTO: 93.9 FL — SIGNIFICANT CHANGE UP (ref 80–100)
MONOCYTES # BLD AUTO: 0.7 K/UL — SIGNIFICANT CHANGE UP (ref 0–0.9)
MONOCYTES NFR BLD AUTO: 6.6 % — SIGNIFICANT CHANGE UP (ref 2–14)
NEUTROPHILS # BLD AUTO: 8.72 K/UL — HIGH (ref 1.8–7.4)
NEUTROPHILS NFR BLD AUTO: 81.8 % — HIGH (ref 43–77)
NITRITE UR-MCNC: NEGATIVE — SIGNIFICANT CHANGE UP
NRBC # BLD: 0 /100 WBCS — SIGNIFICANT CHANGE UP (ref 0–0)
NRBC # FLD: 0 K/UL — SIGNIFICANT CHANGE UP (ref 0–0)
PCO2 BLDV: 56 MMHG — HIGH (ref 39–52)
PCO2 BLDV: 57 MMHG — HIGH (ref 39–52)
PH BLDV: 7.29 — LOW (ref 7.32–7.43)
PH BLDV: 7.31 — LOW (ref 7.32–7.43)
PH UR: 6 — SIGNIFICANT CHANGE UP (ref 5–8)
PHOSPHATE SERPL-MCNC: 2.7 MG/DL — SIGNIFICANT CHANGE UP (ref 2.5–4.5)
PLATELET # BLD AUTO: 157 K/UL — SIGNIFICANT CHANGE UP (ref 150–400)
PO2 BLDV: 30 MMHG — SIGNIFICANT CHANGE UP (ref 25–45)
PO2 BLDV: 36 MMHG — SIGNIFICANT CHANGE UP (ref 25–45)
POTASSIUM BLDV-SCNC: 4.1 MMOL/L — SIGNIFICANT CHANGE UP (ref 3.5–5.1)
POTASSIUM BLDV-SCNC: 4.5 MMOL/L — SIGNIFICANT CHANGE UP (ref 3.5–5.1)
POTASSIUM SERPL-MCNC: 4.3 MMOL/L — SIGNIFICANT CHANGE UP (ref 3.5–5.3)
POTASSIUM SERPL-SCNC: 4.3 MMOL/L — SIGNIFICANT CHANGE UP (ref 3.5–5.3)
PROCALCITONIN SERPL-MCNC: 0.16 NG/ML — HIGH (ref 0.02–0.1)
PROT UR-MCNC: 30 MG/DL
RAPID RVP RESULT: DETECTED
RBC # BLD: 4.59 M/UL — SIGNIFICANT CHANGE UP (ref 3.8–5.2)
RBC # FLD: 12.8 % — SIGNIFICANT CHANGE UP (ref 10.3–14.5)
RBC CASTS # UR COMP ASSIST: 2 /HPF — SIGNIFICANT CHANGE UP (ref 0–4)
RSV RNA NPH QL NAA+NON-PROBE: SIGNIFICANT CHANGE UP
RSV RNA SPEC QL NAA+PROBE: SIGNIFICANT CHANGE UP
RV+EV RNA SPEC QL NAA+PROBE: SIGNIFICANT CHANGE UP
SAO2 % BLDV: 40.3 % — LOW (ref 67–88)
SAO2 % BLDV: 55.1 % — LOW (ref 67–88)
SARS-COV-2 RNA SPEC QL NAA+PROBE: DETECTED
SARS-COV-2 RNA SPEC QL NAA+PROBE: DETECTED
SODIUM SERPL-SCNC: 139 MMOL/L — SIGNIFICANT CHANGE UP (ref 135–145)
SP GR SPEC: 1.02 — SIGNIFICANT CHANGE UP (ref 1–1.03)
SQUAMOUS # UR AUTO: 7 /HPF — HIGH (ref 0–5)
TROPONIN T, HIGH SENSITIVITY RESULT: 31 NG/L — SIGNIFICANT CHANGE UP
TROPONIN T, HIGH SENSITIVITY RESULT: 39 NG/L — SIGNIFICANT CHANGE UP
UROBILINOGEN FLD QL: 0.2 MG/DL — SIGNIFICANT CHANGE UP (ref 0.2–1)
WBC # BLD: 10.65 K/UL — HIGH (ref 3.8–10.5)
WBC # FLD AUTO: 10.65 K/UL — HIGH (ref 3.8–10.5)
WBC UR QL: 1 /HPF — SIGNIFICANT CHANGE UP (ref 0–5)

## 2024-01-27 PROCEDURE — 71045 X-RAY EXAM CHEST 1 VIEW: CPT | Mod: 26

## 2024-01-27 PROCEDURE — 99291 CRITICAL CARE FIRST HOUR: CPT

## 2024-01-27 PROCEDURE — 99223 1ST HOSP IP/OBS HIGH 75: CPT | Mod: GC

## 2024-01-27 RX ORDER — SODIUM CHLORIDE 9 MG/ML
1000 INJECTION INTRAMUSCULAR; INTRAVENOUS; SUBCUTANEOUS
Refills: 0 | Status: DISCONTINUED | OUTPATIENT
Start: 2024-01-27 | End: 2024-01-29

## 2024-01-27 RX ORDER — DEXAMETHASONE 0.5 MG/5ML
10 ELIXIR ORAL ONCE
Refills: 0 | Status: COMPLETED | OUTPATIENT
Start: 2024-01-27 | End: 2024-01-27

## 2024-01-27 RX ORDER — ACETAMINOPHEN 500 MG
650 TABLET ORAL EVERY 6 HOURS
Refills: 0 | Status: DISCONTINUED | OUTPATIENT
Start: 2024-01-27 | End: 2024-01-29

## 2024-01-27 RX ORDER — HALOPERIDOL DECANOATE 100 MG/ML
2.5 INJECTION INTRAMUSCULAR ONCE
Refills: 0 | Status: COMPLETED | OUTPATIENT
Start: 2024-01-27 | End: 2024-01-27

## 2024-01-27 RX ORDER — LANOLIN ALCOHOL/MO/W.PET/CERES
3 CREAM (GRAM) TOPICAL AT BEDTIME
Refills: 0 | Status: DISCONTINUED | OUTPATIENT
Start: 2024-01-27 | End: 2024-01-29

## 2024-01-27 RX ORDER — SODIUM CHLORIDE 9 MG/ML
500 INJECTION, SOLUTION INTRAVENOUS ONCE
Refills: 0 | Status: COMPLETED | OUTPATIENT
Start: 2024-01-27 | End: 2024-01-27

## 2024-01-27 RX ORDER — ENOXAPARIN SODIUM 100 MG/ML
40 INJECTION SUBCUTANEOUS EVERY 24 HOURS
Refills: 0 | Status: DISCONTINUED | OUTPATIENT
Start: 2024-01-27 | End: 2024-02-02

## 2024-01-27 RX ORDER — SODIUM CHLORIDE 9 MG/ML
1000 INJECTION INTRAMUSCULAR; INTRAVENOUS; SUBCUTANEOUS ONCE
Refills: 0 | Status: COMPLETED | OUTPATIENT
Start: 2024-01-27 | End: 2024-01-27

## 2024-01-27 RX ADMIN — HALOPERIDOL DECANOATE 2.5 MILLIGRAM(S): 100 INJECTION INTRAMUSCULAR at 08:44

## 2024-01-27 RX ADMIN — Medication 102 MILLIGRAM(S): at 07:59

## 2024-01-27 RX ADMIN — SODIUM CHLORIDE 500 MILLILITER(S): 9 INJECTION, SOLUTION INTRAVENOUS at 07:00

## 2024-01-27 RX ADMIN — SODIUM CHLORIDE 75 MILLILITER(S): 9 INJECTION INTRAMUSCULAR; INTRAVENOUS; SUBCUTANEOUS at 17:43

## 2024-01-27 RX ADMIN — SODIUM CHLORIDE 1000 MILLILITER(S): 9 INJECTION INTRAMUSCULAR; INTRAVENOUS; SUBCUTANEOUS at 04:12

## 2024-01-27 RX ADMIN — ENOXAPARIN SODIUM 40 MILLIGRAM(S): 100 INJECTION SUBCUTANEOUS at 21:56

## 2024-01-27 NOTE — H&P ADULT - PROBLEM SELECTOR PLAN 1
- On arrival in the ED BP 99/53 with improvement to 120/60-80s after 1L IVF bolus,  - Likely i/s/o poor PO intake   - Per son has a history of CHF but recent echo from 1/25 shows normal EF (61%), prior echo studies also WNL  [ ] continue mIVF for now   [ ] f/u UA, urine cultures i/s/o recent ESBL UTI - On arrival in the ED BP 99/53 with improvement to 120/60-80s after 1L IVF bolus,  - Etiology sepsis vs. hypovolemia i/s/o poor PO intake   - Per son has a history of CHF but recent echo from 1/25 shows normal EF (61%), prior echo studies also WNL  [ ] continue mIVF for now   [ ] f/u UA, urine cultures i/s/o recent ESBL UTI

## 2024-01-27 NOTE — PATIENT PROFILE ADULT - FALL HARM RISK - HARM RISK INTERVENTIONS
Assistance with ambulation/Assistance OOB with selected safe patient handling equipment/Communicate Risk of Fall with Harm to all staff/Monitor gait and stability/Reinforce activity limits and safety measures with patient and family/Review medications for side effects contributing to fall risk/Sit up slowly, dangle for a short time, stand at bedside before walking/Tailored Fall Risk Interventions/Toileting schedule using arm’s reach rule for commode and bathroom/Visual Cue: Yellow wristband and red socks/Bed in lowest position, wheels locked, appropriate side rails in place/Call bell, personal items and telephone in reach/Instruct patient to call for assistance before getting out of bed or chair/Non-slip footwear when patient is out of bed/[place] to call system/Physically safe environment - no spills, clutter or unnecessary equipment/Purposeful Proactive Rounding/Room/bathroom lighting operational, light cord in reach

## 2024-01-27 NOTE — ED ADULT NURSE NOTE - OBJECTIVE STATEMENT
Facilitator RN: Pt brought to RM 12 as a notifaction for hypotension. As per EMS, pt recently discharge from hospital due to UTI and COVID +. As per family member, pt was found lethargic at home, ems called, patient found to be hypotensive. Pt alert and awake, arriving with a 22gIV R hand, NS 300CC given prior to arrival. BP 90/50s MAP (72). Pt placed on CM, USIV in progress, EKG in progress, MD Rendon at bedside for evaluation, FS performed. Respirations are even and unlabored, NAD, O2=310% RA, Safety precautions implemented as per protocol, awaiting further MD orders, plan of care ongoing. Report endorsed to primary RN Darlene.

## 2024-01-27 NOTE — H&P ADULT - PROBLEM SELECTOR PLAN 2
- Hx of ESBL E.coli s/p ertapenem x 3 days   - Consider prolonged duration of abx  [ ] CTAP w/ contrast to rule out abscess/pyelonephritis

## 2024-01-27 NOTE — ED PROVIDER NOTE - DISPOSITION TYPE
Render Post-Care Instructions In Note?: no Detail Level: Simple Post-Care Instructions: I reviewed with the patient in detail post-care instructions. Patient is to wear sunprotection, and avoid picking at any of the treated lesions. Pt may apply Vaseline to crusted or scabbing areas. Medical Necessity Clause: This procedure was medically necessary because the lesions that were treated were: Medical Necessity Information: It is in your best interest to select a reason for this procedure from the list below. All of these items fulfill various CMS LCD requirements except the new and changing color options. Consent: The patient's consent was obtained including but not limited to risks of crusting, scabbing, blistering, scarring, darker or lighter pigmentary change, recurrence, incomplete removal and infection. Duration Of Freeze Thaw-Cycle (Seconds): 0 ADMIT

## 2024-01-27 NOTE — H&P ADULT - NSHPLABSRESULTS_GEN_ALL_CORE
14.0   10.65 )-----------( 157      ( 27 Jan 2024 04:32 )             43.1     01-27    139  |  102  |  16  ----------------------------<  166<H>  4.3   |  24  |  1.15    Ca    8.5      27 Jan 2024 04:00  Phos  2.7     01-27  Mg     2.10     01-27      < from: Xray Chest 1 View- PORTABLE-Urgent (Xray Chest 1 View- PORTABLE-Urgent .) (01.27.24 @ 06:18) >    IMPRESSION:  Clear lungs.    < end of copied text >    < from: TTE W or WO Ultrasound Enhancing Agent (01.25.24 @ 17:09) >    CONCLUSIONS:      1. Technically difficult image quality.   2. Left ventricular systolic function is normal with an ejection fraction of 61 % by Ramirez's method of disks.   3. Trace mitral regurgitation.   4. Trace aortic regurgitation.   5. Trace tricuspid regurgitation.   6. No pericardial effusion seen.   7. No prior echocardiogram is available for comparison.    < end of copied text >

## 2024-01-27 NOTE — ED PROVIDER NOTE - ATTENDING CONTRIBUTION TO CARE
89-year-old female with history of CHF preserved ejection fraction, hypertension, hypothyroidism, atrial fibrillation not on anticoagulation, dementia, recent discharge from [facility] yesterday for UTI and COVID-19, presenting to emergency room today for hypotension.  Per son, patient arrived home after discharge and shortly after arrival began to appear lethargic, he called 911 and says BP was systolic BP 50s.  Per son, patient was not discharged with antibiotics as she completed her course in the hospital.  About 300 cc normal saline given by EMS prior to arrival with improved blood pressure.  Per son, no recent fevers, vomiting, diarrhea, chest pain, abdominal pain.    Son Bill–[phone]  Son Ruben–[phone]    Exam  BP 99/47 (MAP 59), rectally afebrile, mildly tachycardic  Lungs clear bilaterally  Abdomen soft nontender nondistended  Awake, alert, moving all extremities    Assessment/plan  Hypotension in the setting of recent admission for UTI  No clinical signs of fluid overload  Hypovolemic versus septic shock highest on differential  Continue IV fluid resuscitation, labs, VBG, blood cultures, UA, urine culture, chest x-ray  Will need admission, pending results            Critical care billing:  Upon my evaluation, this patient had a high probability of imminent or life-threatening deterioration due to shock state, which required my direct attention, intervention, and personal management.  The patient has a  medical condition that impairs one or more vital organ systems.  Frequent personal assessment and adjustment of medical interventions was performed.      I have personally provided 30 minutes of critical care time exclusive of time spent on separately billable procedures. Time includes review of laboratory data, radiology results, discussion with consultants, patient and family; monitoring for potential decompensation, as well as time spent retrieving data and reviewing the chart and documenting the visit. Interventions were performed as documented above. 89-year-old female with history of CHF preserved ejection fraction, hypertension, hypothyroidism, atrial fibrillation not on anticoagulation, dementia, recent discharge from [facility] yesterday for UTI and COVID-19, presenting to emergency room today for hypotension.  Per son, patient arrived home after discharge and shortly after arrival began to appear lethargic, he called 911 and says BP was systolic BP 50s.  Per son, patient was not discharged with antibiotics as she completed her course in the hospital.  About 300 cc normal saline given by EMS prior to arrival with improved blood pressure.  Per son, no recent fevers, vomiting, diarrhea, chest pain, abdominal pain.    Son Bill–[phone]  Son Ruben–[phone]    Exam  BP 99/47 (MAP 59), rectally afebrile, mildly tachycardic  Lungs clear bilaterally  Abdomen soft nontender nondistended  Awake, alert, moving all extremities    Assessment/plan  Hypotension in the setting of recent admission for UTI  No clinical signs of fluid overload  Hypovolemic versus septic shock highest on differential  Continue IV fluid resuscitation, labs, VBG, blood cultures, UA, urine culture, chest x-ray  Will need admission, pending results    PATIENT IS FULL CODE        Critical care billing:  Upon my evaluation, this patient had a high probability of imminent or life-threatening deterioration due to shock state, which required my direct attention, intervention, and personal management.  The patient has a  medical condition that impairs one or more vital organ systems.  Frequent personal assessment and adjustment of medical interventions was performed.      I have personally provided 30 minutes of critical care time exclusive of time spent on separately billable procedures. Time includes review of laboratory data, radiology results, discussion with consultants, patient and family; monitoring for potential decompensation, as well as time spent retrieving data and reviewing the chart and documenting the visit. Interventions were performed as documented above.

## 2024-01-27 NOTE — ED PROVIDER NOTE - PHYSICAL EXAMINATION
Gen: Patient is NAD, AAOx2, able to answer questions   HEENT: NCAT, EOMI, PERRLA, normal conjunctiva, tongue midline, oral mucosa moist  Lung: CTAB, no respiratory distress, no wheezes/rhonchi/rales B/L, speaking in full sentences  CV: tachycardic, no murmurs, rubs or gallops, distal pulses 2+ b/l  Abd: soft, NT, ND, no guarding, no rigidity, no rebound tenderness  MSK: no visible deformities, ROM normal in UE/LE  Neuro: No focal sensory or motor deficits  Skin: Warm, well perfused, no rash, no leg swelling  Psych: normal affect, calm Gen: Patient is NAD, AAOx2, able to answer questions   HEENT: NCAT, EOMI, PERRLA, normal conjunctiva, tongue midline, oral mucosa dry  Lung: CTAB, no respiratory distress, no wheezes/rhonchi/rales B/L, speaking in full sentences  CV: tachycardic, no murmurs, rubs or gallops, distal pulses 2+ b/l  Abd: soft, NT, ND, no guarding, no rigidity, no rebound tenderness  MSK: no visible deformities, ROM normal in UE/LE  Neuro: No focal sensory or motor deficits  Skin: Warm, well perfused, no rash, no leg swelling  Psych: normal affect, calm

## 2024-01-27 NOTE — H&P ADULT - PROBLEM SELECTOR PLAN 4
Diet: DASH pending bedside dysphagia screen  DVT ppx: lovenox   Dispo: pending - Pt hypoxic on arrival, received decadron. Tested positive for COVID during prior hospitalization (1/22), still positive today.  - CXR with clear lungs   [ ] CT chest noncon to r/o superimposed pna

## 2024-01-27 NOTE — H&P ADULT - ATTENDING COMMENTS
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 Per EMS patient was hypotensive to 60/40 improved to 125/72 - patient is now s/p IVF bolus continue maintenance fluid. Consider in setting of intravascular depletion vs less likely infectious etiology - patient was recently treated for UTI with ertapenem x 3 days UCx sensitive to ertapenem. f/u infectious eval BCx, UCx. f/u wound care for heel pressure and b/l gluteal ulcers. Must clarify why patient is not on AC for afib and why patient is no longer on thyroid/CHF medication - would obtain records from PCP.

## 2024-01-27 NOTE — ED PROVIDER NOTE - PROGRESS NOTE DETAILS
After observation in ER, patient noted to be hypoxic to high 80s on room air in setting of known COVID19. Supplemental O2 applied. IV decadron ordered.

## 2024-01-27 NOTE — H&P ADULT - ASSESSMENT
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

## 2024-01-27 NOTE — H&P ADULT - PROBLEM SELECTOR PLAN 3
- Pt hypoxic on arrival, received decadron. Tested positive for COVID during prior hospitalization (1/22), still positive today.  - CXR with clear lungs   [ ] CT chest noncon to r/o superimposed pna -Likely i/s/o hypotension  []consider CTH if mental status does not improve

## 2024-01-27 NOTE — H&P ADULT - HISTORY OF PRESENT ILLNESS
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 Found to be hypotensive to 60/40 in the field. Of note just discharged yesterday from recent admission 1/22-1/26 for fall, found to be COVID+ at the time with troponinemia and ESBL E. Coli, s/p ertapenem x 3 days.   In the ED on arrival T97.9 P112 BP 99/53 with improvement to 120/60-80s after 1L IVF bolus, RR 20s SpO2 100% on 2L NC.   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 Found to be hypotensive to 60/40 in the field. Of note just discharged yesterday from recent admission 1/22-1/26 for fall, found to be COVID+ at the time with troponinemia and ESBL E. Coli, s/p ertapenem x 3 days.   In the ED on arrival T97.9 P112 BP 99/53 with improvement to 120/60-80s after 1L IVF bolus, RR 20s SpO2 100% on 2L NC.   Labs significant for WBC 10.6, Trop 39 -> 31, VBG 7.29/56/30/27, lactate 4 --> 1.7.  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 Of note just discharged yesterday from recent admission 1/22-1/26 for fall, found to be COVID+ at the time with troponinemia and ESBL E. Coli, s/p ertapenem x 3 days. Per son, patient seemed very tired yesterday after she was discharged from the hospital and wasn't eating/drinking much. Son found her unresponsive and called EMS, found to be hypotensive to 60/40 in the field.    In the ED on arrival T97.9 P112 BP 99/53 with improvement to 120/60-80s after 1L IVF bolus, RR 20s SpO2 100% on 2L NC.   Labs significant for WBC 10.6, Trop 39 -> 31, VBG 7.29/56/30/27, lactate 4 --> 1.7.  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 Of note just discharged yesterday from recent admission 1/22-1/26 for fall, found to be COVID+ at the time with troponinemia and ESBL E. Coli, s/p ertapenem x 3 days. Per son, patient seemed very tired yesterday after she was discharged from the hospital and wasn't eating/drinking much. Son found her unresponsive and called EMS, found to be hypotensive to 60/40 in the field.    In the ED on arrival T97.9 P112 BP 99/53 with improvement to 120/60-80s after 1L IVF bolus, RR 20s SpO2 100% on 2L NC. Received decadron 10 mg, haldol 2.5mg.   Labs significant for WBC 10.6, Trop 39 -> 31, VBG 7.29/56/30/27, lactate 4 --> 1.7.

## 2024-01-27 NOTE — ED PROVIDER NOTE - CLINICAL SUMMARY MEDICAL DECISION MAKING FREE TEXT BOX
89-year-old female history of hypertension, ?CHF, hypothyroidism, A-fib, dementia, right hip replacement, recently discharged from hospitalization (for COVID infection and ESBL- UTI) at [facility] yesterday, now presenting from home with concern for altered mental status, lethargy, hypotension in the field.  Patient's son at bedside.  Reports that since discharge from the hospital, patient appears lethargic and he called the ambulance.  Per EMS, patient was hypotensive to 60/40 in the field.  Patient appears awake and alert.  Patient is denying any symptoms at this time. Patient's MAP 59, mildly tachycardic to 100s, satting 97% on room air on arrival.  Exam patient is awake and alert, AO x 2, answering questions, speaking full sentences, normal respiratory effort, clear lung exam bilaterally, abdomen soft, nontender, no leg swelling noted, no focal sensorimotor deficit noted, pupils are equal and reactive to light bilaterally.  Differentials include but not limited to septic shock versus dehydration.  Patient's MAP improved to 120/55 after one liter of fluid was given.  Will get a rectal temperature, sepsis workup, chest x-ray, UA, reassess.

## 2024-01-27 NOTE — ED ADULT NURSE REASSESSMENT NOTE - NS ED NURSE REASSESS COMMENT FT1
pt A&OX3 RR even unlabored completing full sentences. placed on 2L NC to maintain o2 saturation 95% and above. pt noted to have BM, normal color and consistency. pt cleaned and changed with PCA for assistance, no skin breakdown noted. sacral area noted to be red but no skin tears. clean diaper in place. per MD ok to place primafit for urine collection prior to straight cath attempt. pt repositioned for comfort. safety measures maintained throughout
report received from float RN. pt on assessment is A&OX2-3. per EMS, pt recently discharge from hospital due to UTI and COVID +. As per family member, pt was found lethargic at home, ems called, patient found to be hypotensive. Pt alert and awake on assessment. 20gUSIV placed by MD to the LAC. labs drawn and sent. per MD Rendon, fluids pressure bagged into patient due to hypotension. EKG done by PCA. safety measures maintained throughout.
Received pt at change of shift, PT is resting in stretcher, easily arousable to verbal stimuli, A+Ox4. pt somnolent.  pt arrives for hypotension, found to be COVID +. multiple attempts at straight cath, pt combative, unable to obtain urine.  20G to RAC, no redness or swelling noted.  plan of care ongoing, no further concerns as of present, patient expresses no other needs at this time, call light within reach.

## 2024-01-27 NOTE — H&P ADULT - NSHPPHYSICALEXAM_GEN_ALL_CORE
EKG:     GENERAL: NAD, lying in bed comfortably  EYES: EOMI, PERRLA, conjunctiva and sclera clear  NECK: Supple, trachea midline, no JVD  HEART: Regular rate and rhythm, no murmurs, rubs, or gallops  LUNGS: Unlabored respirations.  Clear to auscultation bilaterally, mild expiratory wheezing. On 2L NC.   ABDOMEN: +Diffuse generalized tenderness, soft, nondistended, +BS  EXTREMITIES: 2+ peripheral pulses bilaterally. No edema  NERVOUS SYSTEM:  AOx2, arousable but lethargic.

## 2024-01-27 NOTE — ED PROVIDER NOTE - OBJECTIVE STATEMENT
89-year-old female history of hypertension, ?CHF, hypothyroidism, A-fib, dementia, right hip replacement, recently discharged from hospitalization (for COVID infection and ESBL- UTI) at [facility] yesterday, now presenting from home with concern for altered mental status, lethargy, hypotension in the field.  Patient's son at bedside.  Reports that since discharge from the hospital, patient appears lethargic and he called the ambulance.  Per EMS, patient was hypotensive to 60/40 in the field.  Patient appears awake and alert.  Patient is denying any symptoms at this time.

## 2024-01-28 ENCOUNTER — TRANSCRIPTION ENCOUNTER (OUTPATIENT)
Age: 89
End: 2024-01-28

## 2024-01-28 DIAGNOSIS — M79.604 PAIN IN RIGHT LEG: ICD-10-CM

## 2024-01-28 LAB
ALBUMIN SERPL ELPH-MCNC: 3.1 G/DL — LOW (ref 3.3–5)
ALP SERPL-CCNC: 63 U/L — SIGNIFICANT CHANGE UP (ref 40–120)
ALT FLD-CCNC: 23 U/L — SIGNIFICANT CHANGE UP (ref 4–33)
ANION GAP SERPL CALC-SCNC: 10 MMOL/L — SIGNIFICANT CHANGE UP (ref 7–14)
AST SERPL-CCNC: 38 U/L — HIGH (ref 4–32)
BASOPHILS # BLD AUTO: 0.01 K/UL — SIGNIFICANT CHANGE UP (ref 0–0.2)
BASOPHILS NFR BLD AUTO: 0.2 % — SIGNIFICANT CHANGE UP (ref 0–2)
BILIRUB SERPL-MCNC: 0.4 MG/DL — SIGNIFICANT CHANGE UP (ref 0.2–1.2)
BUN SERPL-MCNC: 18 MG/DL — SIGNIFICANT CHANGE UP (ref 7–23)
CALCIUM SERPL-MCNC: 8.4 MG/DL — SIGNIFICANT CHANGE UP (ref 8.4–10.5)
CHLORIDE SERPL-SCNC: 105 MMOL/L — SIGNIFICANT CHANGE UP (ref 98–107)
CO2 SERPL-SCNC: 24 MMOL/L — SIGNIFICANT CHANGE UP (ref 22–31)
CREAT SERPL-MCNC: 0.79 MG/DL — SIGNIFICANT CHANGE UP (ref 0.5–1.3)
EGFR: 70 ML/MIN/1.73M2 — SIGNIFICANT CHANGE UP
EOSINOPHIL # BLD AUTO: 0 K/UL — SIGNIFICANT CHANGE UP (ref 0–0.5)
EOSINOPHIL NFR BLD AUTO: 0 % — SIGNIFICANT CHANGE UP (ref 0–6)
GLUCOSE SERPL-MCNC: 91 MG/DL — SIGNIFICANT CHANGE UP (ref 70–99)
HCT VFR BLD CALC: 37.3 % — SIGNIFICANT CHANGE UP (ref 34.5–45)
HGB BLD-MCNC: 12.3 G/DL — SIGNIFICANT CHANGE UP (ref 11.5–15.5)
IANC: 3.76 K/UL — SIGNIFICANT CHANGE UP (ref 1.8–7.4)
IMM GRANULOCYTES NFR BLD AUTO: 0.3 % — SIGNIFICANT CHANGE UP (ref 0–0.9)
LYMPHOCYTES # BLD AUTO: 2.22 K/UL — SIGNIFICANT CHANGE UP (ref 1–3.3)
LYMPHOCYTES # BLD AUTO: 33.8 % — SIGNIFICANT CHANGE UP (ref 13–44)
MAGNESIUM SERPL-MCNC: 2.1 MG/DL — SIGNIFICANT CHANGE UP (ref 1.6–2.6)
MCHC RBC-ENTMCNC: 30.7 PG — SIGNIFICANT CHANGE UP (ref 27–34)
MCHC RBC-ENTMCNC: 33 GM/DL — SIGNIFICANT CHANGE UP (ref 32–36)
MCV RBC AUTO: 93 FL — SIGNIFICANT CHANGE UP (ref 80–100)
MONOCYTES # BLD AUTO: 0.55 K/UL — SIGNIFICANT CHANGE UP (ref 0–0.9)
MONOCYTES NFR BLD AUTO: 8.4 % — SIGNIFICANT CHANGE UP (ref 2–14)
MRSA PCR RESULT.: DETECTED
NEUTROPHILS # BLD AUTO: 3.76 K/UL — SIGNIFICANT CHANGE UP (ref 1.8–7.4)
NEUTROPHILS NFR BLD AUTO: 57.3 % — SIGNIFICANT CHANGE UP (ref 43–77)
NRBC # BLD: 0 /100 WBCS — SIGNIFICANT CHANGE UP (ref 0–0)
NRBC # FLD: 0 K/UL — SIGNIFICANT CHANGE UP (ref 0–0)
NT-PROBNP SERPL-SCNC: 590 PG/ML — HIGH
PHOSPHATE SERPL-MCNC: 1.9 MG/DL — LOW (ref 2.5–4.5)
PLATELET # BLD AUTO: 152 K/UL — SIGNIFICANT CHANGE UP (ref 150–400)
POTASSIUM SERPL-MCNC: 4.4 MMOL/L — SIGNIFICANT CHANGE UP (ref 3.5–5.3)
POTASSIUM SERPL-SCNC: 4.4 MMOL/L — SIGNIFICANT CHANGE UP (ref 3.5–5.3)
PROT SERPL-MCNC: 5.8 G/DL — LOW (ref 6–8.3)
RBC # BLD: 4.01 M/UL — SIGNIFICANT CHANGE UP (ref 3.8–5.2)
RBC # FLD: 12.8 % — SIGNIFICANT CHANGE UP (ref 10.3–14.5)
S AUREUS DNA NOSE QL NAA+PROBE: DETECTED
SODIUM SERPL-SCNC: 139 MMOL/L — SIGNIFICANT CHANGE UP (ref 135–145)
TSH SERPL-MCNC: 2.41 UIU/ML — SIGNIFICANT CHANGE UP (ref 0.27–4.2)
WBC # BLD: 6.56 K/UL — SIGNIFICANT CHANGE UP (ref 3.8–10.5)
WBC # FLD AUTO: 6.56 K/UL — SIGNIFICANT CHANGE UP (ref 3.8–10.5)

## 2024-01-28 PROCEDURE — 73551 X-RAY EXAM OF FEMUR 1: CPT | Mod: 26,RT

## 2024-01-28 PROCEDURE — 99233 SBSQ HOSP IP/OBS HIGH 50: CPT

## 2024-01-28 RX ORDER — SODIUM,POTASSIUM PHOSPHATES 278-250MG
1 POWDER IN PACKET (EA) ORAL ONCE
Refills: 0 | Status: COMPLETED | OUTPATIENT
Start: 2024-01-28 | End: 2024-01-28

## 2024-01-28 RX ADMIN — ENOXAPARIN SODIUM 40 MILLIGRAM(S): 100 INJECTION SUBCUTANEOUS at 22:23

## 2024-01-28 RX ADMIN — Medication 1 PACKET(S): at 18:18

## 2024-01-28 NOTE — DISCHARGE NOTE PROVIDER - CARE PROVIDER_API CALL
Jose M Vuong  Internal Medicine  [facility] [facility], [place] [address]  Phone: [phone]  Fax: [phone]  Follow Up Time: 2 weeks

## 2024-01-28 NOTE — PHYSICAL THERAPY INITIAL EVALUATION ADULT - ADDITIONAL COMMENTS
Pt. reports owning a rolling walker, transport chair, and stair lift.     Pt. was left in bed post PT Evaluation, no apparent distress, call bell within reach.

## 2024-01-28 NOTE — PROGRESS NOTE ADULT - PROBLEM SELECTOR PLAN 9
Diet: DASH pending bedside dysphagia screen  DVT ppx: lovenox   Dispo: pending Diet: DASH   DVT ppx: lovenox   Dispo: pending

## 2024-01-28 NOTE — PROGRESS NOTE ADULT - PROBLEM SELECTOR PLAN 7
[ ] F/u TSH in AM and free T4 - Not on AC, although used to be on ASA per cardiology note from 2021  [ ] clarify regimen - TSH WNL  - not on medication

## 2024-01-28 NOTE — DISCHARGE NOTE PROVIDER - NSDCCPCAREPLAN_GEN_ALL_CORE_FT
PRINCIPAL DISCHARGE DIAGNOSIS  Diagnosis: Hypotension  Assessment and Plan of Treatment: You were admitted to the hospital because of low blood pressure and lethargy. This was most likely due to poor fluid intake over the previous day. You recovered well with IV fluid resuscitation and did not require any pressors or medications to raise your blood pressure. Please make sure you follow up with your PCP within 1 week after discharge.

## 2024-01-28 NOTE — DISCHARGE NOTE PROVIDER - NSDCFUADDAPPT_GEN_ALL_CORE_FT
APPTS ARE READY TO BE MADE: [ ] YES    Best Family or Patient Contact (if needed):    Additional Information about above appointments (if needed):    1: Primary care provider  2:   3:     Other comments or requests:    APPTS ARE READY TO BE MADE: [ X ] YES    Best Family or Patient Contact (if needed):    Additional Information about above appointments (if needed):    1: Primary care provider  2:   3:     Other comments or requests:    APPTS ARE READY TO BE MADE: [ X ] YES    Best Family or Patient Contact (if needed):    Additional Information about above appointments (if needed):    1: Primary care provider      Other comments or requests:    APPTS ARE READY TO BE MADE: [ X ] YES    Best Family or Patient Contact (if needed):    Additional Information about above appointments (if needed):    1: Primary care provider      Other comments or requests:   Patient is being discharged to rehab. Patient/caregiver will arrange follow up appointments.

## 2024-01-28 NOTE — PROGRESS NOTE ADULT - PROBLEM SELECTOR PLAN 8
- Not on AC, although used to be on ASA per cardiology note from 2021  [ ] clarify regimen Diet: DASH   DVT ppx: lovenox   Dispo: pending - Not on AC, although used to be on ASA per cardiology note from 2021

## 2024-01-28 NOTE — DISCHARGE NOTE PROVIDER - HOSPITAL COURSE
Discharge Summary     Hospital Course:   For full details, please see H&P, progress notes, consult notes, and ancillary notes.   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 Of note just discharged the day beforefrom recent admission 1/22-1/26 for fall, found to be COVID+ at the time with troponinemia and ESBL E. Coli, s/p ertapenem x 3 days. Patient's hypotension was responsive to fluids and she initially received decadron in the ED and required 2-3L nasal cannula, weaned off after one day. Inititally also with elevated lactate to 4 that resolved after fluid resuscitation. RLE X-Ray showed ____.  PT recommended ____. UA was negative and blood and urine cultures showed _______.     On day of discharge, patient is clinically stable with no new exam findings or acute symptoms compared to prior. The patient was seen by the attending physician on the date of discharge and deemed stable and acceptable for discharge. The patient's chronic medical conditions were treated accordingly per the patient's home medication regimen. The patient's medication reconciliation, follow up appointments, discharge instructions, and significant lab and diagnostic studies are as noted.     Discharge follow up action items:     1. Follow up with PCP  2. Follow up labs, path, & imaging ***  3. Medication changes:  Please START taking:  Please STOP taking:  The dose has CHANGED:  Please continue to take all other medications as prescribed.    Patient's ordered code status: ***    Patient disposition: ***     Discharge Summary     Hospital Course:   For full details, please see H&P, progress notes, consult notes, and ancillary notes.   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 Of note just discharged the day beforefrom recent admission 1/22-1/26 for fall, found to be COVID+ at the time with troponinemia and ESBL E. Coli, s/p ertapenem x 3 days. Patient's hypotension was responsive to fluids and she initially received decadron in the ED and required 2-3L nasal cannula, weaned off after one day. Inititally also with elevated lactate to 4 that resolved after fluid resuscitation. UA and BCx were negative. PT recommended restorative rehab.     On day of discharge, patient is clinically stable with no new exam findings or acute symptoms compared to prior. The patient was seen by the attending physician on the date of discharge and deemed stable and acceptable for discharge.        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 Of note patient recently admitted 1/22-1/26 for fall, found to be COVID+ at the time with troponinemia and ESBL E. Coli, s/p ertapenem x 3 days. Patient's hypotension was responsive to fluids and she received decadron in the ED. Also required 2-3L nasal cannula which was weaned off after one day. Inititally had elevated lactate to 4 that resolved after fluid resuscitation. UA and BCx were negative. PT recommended restorative rehab. Rest of hospital course was uncomplicated.    On day of discharge, patient is clinically stable with no new exam findings or acute symptoms compared to prior. The patient was seen by the attending physician on the date of discharge and deemed stable and acceptable for discharge.        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 Of note patient recently admitted 1/22-1/26 for fall, found to be COVID+ at the time with troponinemia and ESBL E. Coli, s/p ertapenem x 3 days. Patient's hypotension was responsive to fluids and she received decadron in the ED. Also required 2-3L nasal cannula which was weaned off after one day. Initially had elevated lactate to 4 that resolved after fluid resuscitation. UA and BCx were negative. PT recommended restorative rehab. Rest of hospital course was uncomplicated.    On day of discharge, patient is clinically stable with no new exam findings or acute symptoms compared to prior. The patient was seen by the attending physician on the date of discharge and deemed stable and acceptable for discharge.

## 2024-01-28 NOTE — PHYSICAL THERAPY INITIAL EVALUATION ADULT - GENERAL OBSERVATIONS, REHAB EVAL
Consult received, chart reviewed. Patient received in bed, no apparent distress. Pt. agreeable to participate in PT.

## 2024-01-28 NOTE — PROGRESS NOTE ADULT - SUBJECTIVE AND OBJECTIVE BOX
*******************************  Odalis Nogueira MD (PGY-1)  Internal Medicine  Contact via [employer] TEAMS  *******************************    INTERVAL HPI/OVERNIGHT EVENTS:      OBJECTIVE  T(C): 36.3 (01-28-24 @ 05:26), Max: 36.9 (01-27-24 @ 08:44)  HR: 87 (01-28-24 @ 05:26) (58 - 87)  BP: 151/68 (01-28-24 @ 05:26) (120/56 - 167/84)  RR: 17 (01-28-24 @ 05:26) (16 - 18)  SpO2: 96% (01-28-24 @ 05:26) (96% - 100%)      PHYSICAL EXAM  General:  HEENT:  Cardiovascular:  Pulmonary:  GI:  :  Neuro:  Psych:          IMAGING:     *******************************  Odalis Nogueira MD (PGY-1)  Internal Medicine  Contact via [employer] TEAMS  *******************************    INTERVAL HPI/OVERNIGHT EVENTS:  No acute overnight events. Very pleasant this morning, denies any complaints except some pain in her R. leg. Unable to say whether the pain is new or chronic.    OBJECTIVE  T(C): 36.3 (01-28-24 @ 05:26), Max: 36.9 (01-27-24 @ 08:44)  HR: 87 (01-28-24 @ 05:26) (58 - 87)  BP: 151/68 (01-28-24 @ 05:26) (120/56 - 167/84)  RR: 17 (01-28-24 @ 05:26) (16 - 18)  SpO2: 96% (01-28-24 @ 05:26) (96% - 100%)      PHYSICAL EXAM  GENERAL: NAD, lying in bed comfortably  EYES: EOMI, PERRLA, conjunctiva and sclera clear  NECK: Supple, trachea midline, no JVD  HEART: Regular rate and rhythm, no murmurs, rubs, or gallops  LUNGS: Unlabored respirations.  Clear to auscultation bilaterally, no crackles, wheezing, or rhonchi  ABDOMEN: Soft, nontender, nondistended, +BS  EXTREMITIES: +Tenderness to palpation in RLE, good strength and ROM. 2+ peripheral pulses bilaterally. No clubbing, cyanosis, or edema  NERVOUS SYSTEM:  A&Ox3, moving all extremities, no focal deficits       IMAGING:

## 2024-01-28 NOTE — DISCHARGE NOTE PROVIDER - ATTENDING DISCHARGE PHYSICAL EXAMINATION:
GENERAL: NAD, well-groomed, well-developed; sitting in bed comfortably   HEAD:  Atraumatic, Normocephalic  EYES: EOMI, PERRLA, conjunctiva and sclera clear  ENMT: No tonsillar erythema, exudates, or enlargement; Moist mucous membranes, Good dentition, No lesions  NECK: Supple, No JVD, Normal thyroid  NERVOUS SYSTEM:  Alert & Oriented X3, Good concentration; Motor Strength 5/5 B/L upper and lower extremities  CHEST/LUNG: Clear to auscultation and tympanic to percussion bilaterally; No rales, rhonchi, wheezing, or rubs  HEART: Regular rate and rhythm; No murmurs, rubs, or gallops  ABDOMEN: Soft, Nontender, Nondistended; Bowel sounds present  EXTREMITIES: 2+ Peripheral Pulses, No clubbing, cyanosis, or edema  LYMPH: No lymphadenopathy noted  SKIN: No rashes, no lesions

## 2024-01-28 NOTE — PROGRESS NOTE ADULT - PROBLEM SELECTOR PLAN 2
- Hx of ESBL E.coli s/p ertapenem x 3 days   - Consider prolonged duration of abx  [ ] CTAP w/ contrast to rule out abscess/pyelonephritis - Hx of ESBL E.coli s/p ertapenem x 3 days   - UA negative   [ ]f/u cultures

## 2024-01-28 NOTE — PROGRESS NOTE ADULT - PROBLEM SELECTOR PLAN 6
-hx of HF, however most recent TTE from 1/25 showed EF 61%  -Not on medications   [ ] f.u pro BNP [ ] F/u TSH in AM and free T4 -hx of HF, however most recent TTE from 1/25 showed EF 61%  -Not on medications   pro

## 2024-01-28 NOTE — DISCHARGE NOTE PROVIDER - NSDCCPTREATMENT_GEN_ALL_CORE_FT
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 Intact aligned hardware components and no periprosthetic fractures   or suspicious periprosthetic lucencies.  Intact remaining imaged osteoarticular structures.  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 Pubic   symphysis degenerative change again noted.  Generalized osteopenia otherwise no discrete lytic or blastic lesions.  External urine catch device overlies vulvar region.  --- End of Report ---

## 2024-01-28 NOTE — PHYSICAL THERAPY INITIAL EVALUATION ADULT - LEVEL OF INDEPENDENCE: SIT/STAND, REHAB EVAL
to be assessed, pt. deferred at this time secondary to feeling "uncertain" about being able to get OOB, depsite PT encouragement/reassurance.

## 2024-01-28 NOTE — PROGRESS NOTE ADULT - PROBLEM SELECTOR PLAN 4
- Pt hypoxic on arrival, received decadron. Tested positive for COVID during prior hospitalization (1/22), still positive today.  - CXR with clear lungs   [ ] CT chest noncon to r/o superimposed pna - Pt hypoxic on arrival, received decadron. Tested positive for COVID during prior hospitalization (1/22), still positive today.  - CXR with clear lungs [ ] Xray RLE to r/o fracture given hx of fall

## 2024-01-28 NOTE — PROGRESS NOTE ADULT - PROBLEM SELECTOR PLAN 5
-hx of HF, however most recent TTE from 1/25 showed EF 61%  -Not on medications   [ ] f.u pro BNP - Pt hypoxic on arrival, received decadron. Tested positive for COVID during prior hospitalization (1/22), still positive today.  - CXR with clear lungs

## 2024-01-28 NOTE — DISCHARGE NOTE PROVIDER - NSFOLLOWUPCLINICS_GEN_ALL_ED_FT
[facility]  Internal Medicine  [address] [place] [address]  Phone: [phone]  Fax: [phone]  Follow Up Time: 2 weeks

## 2024-01-28 NOTE — PROGRESS NOTE ADULT - PROBLEM SELECTOR PLAN 1
- On arrival in the ED BP 99/53 with improvement to 120/60-80s after 1L IVF bolus,  - Etiology sepsis vs. hypovolemia i/s/o poor PO intake   - Per son has a history of CHF but recent echo from 1/25 shows normal EF (61%), prior echo studies also WNL  [ ] continue mIVF for now   [ ] f/u UA, urine cultures i/s/o recent ESBL UTI - On arrival in the ED BP 99/53 with improvement to 120/60-80s after 1L IVF bolus,  - Etiology sepsis vs. hypovolemia i/s/o poor PO intake   - Per son has a history of CHF but recent echo from 1/25 shows normal EF (61%), prior echo studies also WNL  [ ] continue mIVF for now   [ ] f/u urine cultures i/s/o recent ESBL UTI - On arrival in the ED BP 99/53 with improvement to 120/60-80s after 1L IVF bolus,  - Etiology sepsis vs. hypovolemia i/s/o poor PO intake   - Per son has a history of CHF but recent echo from 1/25 shows normal EF (61%), prior echo studies also WNL  [ ] orthostatics  [ ] f/u urine cultures i/s/o recent ESBL UTI

## 2024-01-29 LAB
ANION GAP SERPL CALC-SCNC: 9 MMOL/L — SIGNIFICANT CHANGE UP (ref 7–14)
BUN SERPL-MCNC: 16 MG/DL — SIGNIFICANT CHANGE UP (ref 7–23)
CALCIUM SERPL-MCNC: 8.6 MG/DL — SIGNIFICANT CHANGE UP (ref 8.4–10.5)
CHLORIDE SERPL-SCNC: 104 MMOL/L — SIGNIFICANT CHANGE UP (ref 98–107)
CO2 SERPL-SCNC: 27 MMOL/L — SIGNIFICANT CHANGE UP (ref 22–31)
CREAT SERPL-MCNC: 0.76 MG/DL — SIGNIFICANT CHANGE UP (ref 0.5–1.3)
EGFR: 73 ML/MIN/1.73M2 — SIGNIFICANT CHANGE UP
GLUCOSE SERPL-MCNC: 96 MG/DL — SIGNIFICANT CHANGE UP (ref 70–99)
HCT VFR BLD CALC: 40.1 % — SIGNIFICANT CHANGE UP (ref 34.5–45)
HGB BLD-MCNC: 13.1 G/DL — SIGNIFICANT CHANGE UP (ref 11.5–15.5)
MAGNESIUM SERPL-MCNC: 2.1 MG/DL — SIGNIFICANT CHANGE UP (ref 1.6–2.6)
MCHC RBC-ENTMCNC: 30.3 PG — SIGNIFICANT CHANGE UP (ref 27–34)
MCHC RBC-ENTMCNC: 32.7 GM/DL — SIGNIFICANT CHANGE UP (ref 32–36)
MCV RBC AUTO: 92.6 FL — SIGNIFICANT CHANGE UP (ref 80–100)
NRBC # BLD: 0 /100 WBCS — SIGNIFICANT CHANGE UP (ref 0–0)
NRBC # FLD: 0 K/UL — SIGNIFICANT CHANGE UP (ref 0–0)
PHOSPHATE SERPL-MCNC: 2 MG/DL — LOW (ref 2.5–4.5)
PLATELET # BLD AUTO: 206 K/UL — SIGNIFICANT CHANGE UP (ref 150–400)
POTASSIUM SERPL-MCNC: 3.9 MMOL/L — SIGNIFICANT CHANGE UP (ref 3.5–5.3)
POTASSIUM SERPL-SCNC: 3.9 MMOL/L — SIGNIFICANT CHANGE UP (ref 3.5–5.3)
RBC # BLD: 4.33 M/UL — SIGNIFICANT CHANGE UP (ref 3.8–5.2)
RBC # FLD: 12.7 % — SIGNIFICANT CHANGE UP (ref 10.3–14.5)
SODIUM SERPL-SCNC: 140 MMOL/L — SIGNIFICANT CHANGE UP (ref 135–145)
WBC # BLD: 7.53 K/UL — SIGNIFICANT CHANGE UP (ref 3.8–10.5)
WBC # FLD AUTO: 7.53 K/UL — SIGNIFICANT CHANGE UP (ref 3.8–10.5)

## 2024-01-29 PROCEDURE — 99232 SBSQ HOSP IP/OBS MODERATE 35: CPT

## 2024-01-29 RX ORDER — MUPIROCIN 20 MG/G
1 OINTMENT TOPICAL EVERY 12 HOURS
Refills: 0 | Status: DISCONTINUED | OUTPATIENT
Start: 2024-01-29 | End: 2024-01-29

## 2024-01-29 RX ORDER — POTASSIUM PHOSPHATE, MONOBASIC POTASSIUM PHOSPHATE, DIBASIC 236; 224 MG/ML; MG/ML
15 INJECTION, SOLUTION INTRAVENOUS ONCE
Refills: 0 | Status: COMPLETED | OUTPATIENT
Start: 2024-01-29 | End: 2024-01-29

## 2024-01-29 RX ORDER — MUPIROCIN 20 MG/G
1 OINTMENT TOPICAL EVERY 12 HOURS
Refills: 0 | Status: DISCONTINUED | OUTPATIENT
Start: 2024-01-29 | End: 2024-02-02

## 2024-01-29 RX ADMIN — POTASSIUM PHOSPHATE, MONOBASIC POTASSIUM PHOSPHATE, DIBASIC 62.5 MILLIMOLE(S): 236; 224 INJECTION, SOLUTION INTRAVENOUS at 13:00

## 2024-01-29 RX ADMIN — ENOXAPARIN SODIUM 40 MILLIGRAM(S): 100 INJECTION SUBCUTANEOUS at 22:09

## 2024-01-29 RX ADMIN — MUPIROCIN 1 APPLICATION(S): 20 OINTMENT TOPICAL at 18:06

## 2024-01-29 NOTE — PROGRESS NOTE ADULT - PROBLEM SELECTOR PLAN 6
-hx of HF, however most recent TTE from 1/25 showed EF 61%  -Not on medications   pro  - TSH WNL  - not on medication

## 2024-01-29 NOTE — PROGRESS NOTE ADULT - SUBJECTIVE AND OBJECTIVE BOX
Que Hylton MD  PGY 1 Department of Internal Medicine        Patient is a 89y old  Female who presents with a chief complaint of shock (28 Jan 2024 18:02)      SUBJECTIVE / OVERNIGHT EVENTS: Pt seen and examined. No acute overnight events. Denies fevers, chills, CP, SOB, Abdominal pain, N/V, Constipation, Diarrhea        MEDICATIONS  (STANDING):  enoxaparin Injectable 40 milliGRAM(s) SubCutaneous every 24 hours    MEDICATIONS  (PRN):  acetaminophen     Tablet .. 650 milliGRAM(s) Oral every 6 hours PRN Temp greater or equal to 38C (100.4F), Mild Pain (1 - 3)      I&O's Summary      Vital Signs Last 24 Hrs  T(C): 36.5 (29 Jan 2024 06:49), Max: 37.6 (28 Jan 2024 21:10)  T(F): 97.7 (29 Jan 2024 06:49), Max: 99.7 (28 Jan 2024 21:10)  HR: 89 (29 Jan 2024 06:49) (74 - 89)  BP: 158/82 (29 Jan 2024 06:49) (134/66 - 166/76)  BP(mean): --  RR: 16 (29 Jan 2024 06:49) (16 - 17)  SpO2: 96% (29 Jan 2024 06:49) (95% - 97%)    Parameters below as of 29 Jan 2024 06:49  Patient On (Oxygen Delivery Method): room air        CAPILLARY BLOOD GLUCOSE          PHYSICAL EXAM:  GENERAL: NAD,   HEAD:  Atraumatic, Normocephalic  EYES: EOMI, PERRL, conjunctiva and sclera clear  NECK: No JVD  CHEST/LUNG: Clear to auscultation bilaterally; No wheeze  HEART: Regular rate and rhythm; No murmurs, rubs, or gallops  ABDOMEN: Soft, Nontender, Nondistended; Bowel sounds present  EXTREMITIES:  2+ Peripheral Pulses, No clubbing, cyanosis, or edema  PSYCH: AAOx3  NEUROLOGY: non-focal  SKIN: No rashes or lesions       LABS:                        12.3   6.56  )-----------( 152      ( 28 Jan 2024 05:35 )             37.3     Auto Eosinophil # 0.00  / Auto Eosinophil % 0.0   / Auto Neutrophil # 3.76  / Auto Neutrophil % 57.3  / BANDS % x        01-28    139  |  105  |  18  ----------------------------<  91  4.4   |  24  |  0.79    Ca    8.4      28 Jan 2024 05:35  Mg     2.10     01-28  Phos  1.9     01-28  TPro  5.8<L>  /  Alb  3.1<L>  /  TBili  0.4  /  DBili  x   /  AST  38<H>  /  ALT  23  /  AlkPhos  63  01-28          Urinalysis Basic - ( 28 Jan 2024 05:35 )    Color: x / Appearance: x / SG: x / pH: x  Gluc: 91 mg/dL / Ketone: x  / Bili: x / Urobili: x   Blood: x / Protein: x / Nitrite: x   Leuk Esterase: x / RBC: x / WBC x   Sq Epi: x / Non Sq Epi: x / Bacteria: x            RADIOLOGY & ADDITIONAL TESTS:    Imaging Personally Reviewed:    Consultant(s) Notes Reviewed:      Care Discussed with Consultants/Other Providers:   Que Hylton MD  PGY 1 Department of Internal Medicine        Patient is a 89y old  Female who presents with a chief complaint of shock (28 Jan 2024 18:02)      SUBJECTIVE / OVERNIGHT EVENTS: Pt seen and examined. No acute overnight events. Denies fevers, chills, CP, SOB, Abdominal pain, N/V, Constipation, Diarrhea        MEDICATIONS  (STANDING):  enoxaparin Injectable 40 milliGRAM(s) SubCutaneous every 24 hours    MEDICATIONS  (PRN):  acetaminophen     Tablet .. 650 milliGRAM(s) Oral every 6 hours PRN Temp greater or equal to 38C (100.4F), Mild Pain (1 - 3)      I&O's Summary      Vital Signs Last 24 Hrs  T(C): 36.5 (29 Jan 2024 06:49), Max: 37.6 (28 Jan 2024 21:10)  T(F): 97.7 (29 Jan 2024 06:49), Max: 99.7 (28 Jan 2024 21:10)  HR: 89 (29 Jan 2024 06:49) (74 - 89)  BP: 158/82 (29 Jan 2024 06:49) (134/66 - 166/76)  BP(mean): --  RR: 16 (29 Jan 2024 06:49) (16 - 17)  SpO2: 96% (29 Jan 2024 06:49) (95% - 97%)    Parameters below as of 29 Jan 2024 06:49  Patient On (Oxygen Delivery Method): room air        CAPILLARY BLOOD GLUCOSE          PHYSICAL EXAM:  GENERAL: NAD,   HEAD:  Atraumatic, Normocephalic  EYES: EOMI, PERRL, conjunctiva and sclera clear  NECK: No JVD  CHEST/LUNG: Clear to auscultation bilaterally; No wheeze  HEART: Regular rate and rhythm; No murmurs, rubs, or gallops  ABDOMEN: Soft, Nontender, Nondistended; Bowel sounds present  EXTREMITIES:  2+ Peripheral Pulses, No clubbing, cyanosis, or edema  PSYCH: AAOx2  NEUROLOGY: non-focal  SKIN: No rashes or lesions       LABS:                        12.3   6.56  )-----------( 152      ( 28 Jan 2024 05:35 )             37.3     Auto Eosinophil # 0.00  / Auto Eosinophil % 0.0   / Auto Neutrophil # 3.76  / Auto Neutrophil % 57.3  / BANDS % x        01-28    139  |  105  |  18  ----------------------------<  91  4.4   |  24  |  0.79    Ca    8.4      28 Jan 2024 05:35  Mg     2.10     01-28  Phos  1.9     01-28  TPro  5.8<L>  /  Alb  3.1<L>  /  TBili  0.4  /  DBili  x   /  AST  38<H>  /  ALT  23  /  AlkPhos  63  01-28          Urinalysis Basic - ( 28 Jan 2024 05:35 )    Color: x / Appearance: x / SG: x / pH: x  Gluc: 91 mg/dL / Ketone: x  / Bili: x / Urobili: x   Blood: x / Protein: x / Nitrite: x   Leuk Esterase: x / RBC: x / WBC x   Sq Epi: x / Non Sq Epi: x / Bacteria: x            RADIOLOGY & ADDITIONAL TESTS:    Imaging Personally Reviewed:    Consultant(s) Notes Reviewed:      Care Discussed with Consultants/Other Providers:

## 2024-01-29 NOTE — PROGRESS NOTE ADULT - PROBLEM SELECTOR PLAN 1
- On arrival in the ED BP 99/53 with improvement to 120/60-80s after 1L IVF bolus,  - Etiology sepsis vs. hypovolemia i/s/o poor PO intake   - Per son has a history of CHF but recent echo from 1/25 shows normal EF (61%), prior echo studies also WNL  [ ] orthostatics  [ ] f/u urine cultures i/s/o recent ESBL UTI In ED BP 99/53 with improvement to 120/60-80s after 1L IVF. Etiology sepsis vs. hypovolemia i/s/o poor PO intake   Per son has a history of CHF but recent echo from 1/25 shows normal EF (61%), prior echo studies also WNL  Hx of ESBL E.coli s/p ertapenem x 3 days from prior admission  - UA negative, f/u urine cultures i/s/o recent ESBL UTI  - f/u BCx 1/27 (NGTD)  -c/w maintenance IVF prn  -monitor off abx  -f/u orthostatics

## 2024-01-29 NOTE — PROGRESS NOTE ADULT - PROBLEM SELECTOR PLAN 2
- Hx of ESBL E.coli s/p ertapenem x 3 days   - UA negative   [ ]f/u cultures -Likely i/s/o hypotension  -ctm

## 2024-01-29 NOTE — PROGRESS NOTE ADULT - PROBLEM SELECTOR PLAN 8
- Not on AC, although used to be on ASA per cardiology note from 2021 Diet: DASH   DVT ppx: lovenox   Dispo: pending Diet: DASH   DVT ppx: lovenox   Dispo: pending RR per pt

## 2024-01-29 NOTE — PROGRESS NOTE ADULT - PROBLEM SELECTOR PLAN 5
- Pt hypoxic on arrival, received decadron. Tested positive for COVID during prior hospitalization (1/22), still positive today.  - CXR with clear lungs -hx of HF, most recent TTE from 1/25 EF 61%  -Not on medications   pro

## 2024-01-29 NOTE — PROGRESS NOTE ADULT - PROBLEM SELECTOR PLAN 3
-Likely i/s/o hypotension  []consider CTH if mental status does not improve - f/u Xray RLE to r/o fracture given hx of fall RLE XR showing chronic changes, no acute fracture or injury

## 2024-01-29 NOTE — PROGRESS NOTE ADULT - PROBLEM SELECTOR PLAN 4
[ ] Xray RLE to r/o fracture given hx of fall Pt hypoxic on arrival, received decadron x1, now improved.   Tested positive for COVID during prior hospitalization (1/22), repeat positive 1/27. CXR 1/27 negative  - Did not receive course of remdesivir or decadron during either admission  - Goal SpO2 >92%, continue supplemental O2 as needed, wean as tolerated. Currently on RA Pt hypoxic on arrival, received decadron x1, now improved.   Tested positive for COVID during prior hospitalization (1/22), repeat positive 1/27. CXR 1/27 negative  - Did not receive course of remdesivir or decadron during either admission  - Goal SpO2 >92%, continue supplemental O2 as needed, wean as tolerated. Currently on RA  - continue supportive care

## 2024-01-29 NOTE — PROGRESS NOTE ADULT - PROBLEM SELECTOR PLAN 7
- TSH WNL  - not on medication - Not on AC, although used to be on ASA per cardiology note from 2021 - Not on AC, although used to be on ASA and xarelto from prior note

## 2024-01-30 LAB
ANION GAP SERPL CALC-SCNC: 9 MMOL/L — SIGNIFICANT CHANGE UP (ref 7–14)
BUN SERPL-MCNC: 11 MG/DL — SIGNIFICANT CHANGE UP (ref 7–23)
CALCIUM SERPL-MCNC: 8.4 MG/DL — SIGNIFICANT CHANGE UP (ref 8.4–10.5)
CHLORIDE SERPL-SCNC: 105 MMOL/L — SIGNIFICANT CHANGE UP (ref 98–107)
CO2 SERPL-SCNC: 28 MMOL/L — SIGNIFICANT CHANGE UP (ref 22–31)
CREAT SERPL-MCNC: 0.81 MG/DL — SIGNIFICANT CHANGE UP (ref 0.5–1.3)
EGFR: 68 ML/MIN/1.73M2 — SIGNIFICANT CHANGE UP
GLUCOSE SERPL-MCNC: 93 MG/DL — SIGNIFICANT CHANGE UP (ref 70–99)
HCT VFR BLD CALC: 41.1 % — SIGNIFICANT CHANGE UP (ref 34.5–45)
HGB BLD-MCNC: 13.2 G/DL — SIGNIFICANT CHANGE UP (ref 11.5–15.5)
LEGIONELLA AB SER-ACNC: <0.91 — SIGNIFICANT CHANGE UP (ref 0–0.9)
MAGNESIUM SERPL-MCNC: 2.1 MG/DL — SIGNIFICANT CHANGE UP (ref 1.6–2.6)
MCHC RBC-ENTMCNC: 30.2 PG — SIGNIFICANT CHANGE UP (ref 27–34)
MCHC RBC-ENTMCNC: 32.1 GM/DL — SIGNIFICANT CHANGE UP (ref 32–36)
MCV RBC AUTO: 94.1 FL — SIGNIFICANT CHANGE UP (ref 80–100)
NRBC # BLD: 0 /100 WBCS — SIGNIFICANT CHANGE UP (ref 0–0)
NRBC # FLD: 0 K/UL — SIGNIFICANT CHANGE UP (ref 0–0)
PHOSPHATE SERPL-MCNC: 2.5 MG/DL — SIGNIFICANT CHANGE UP (ref 2.5–4.5)
PLATELET # BLD AUTO: 222 K/UL — SIGNIFICANT CHANGE UP (ref 150–400)
POTASSIUM SERPL-MCNC: 4.1 MMOL/L — SIGNIFICANT CHANGE UP (ref 3.5–5.3)
POTASSIUM SERPL-SCNC: 4.1 MMOL/L — SIGNIFICANT CHANGE UP (ref 3.5–5.3)
RBC # BLD: 4.37 M/UL — SIGNIFICANT CHANGE UP (ref 3.8–5.2)
RBC # FLD: 12.7 % — SIGNIFICANT CHANGE UP (ref 10.3–14.5)
SODIUM SERPL-SCNC: 142 MMOL/L — SIGNIFICANT CHANGE UP (ref 135–145)
WBC # BLD: 6 K/UL — SIGNIFICANT CHANGE UP (ref 3.8–10.5)
WBC # FLD AUTO: 6 K/UL — SIGNIFICANT CHANGE UP (ref 3.8–10.5)

## 2024-01-30 PROCEDURE — 99232 SBSQ HOSP IP/OBS MODERATE 35: CPT

## 2024-01-30 RX ORDER — POTASSIUM PHOSPHATE, MONOBASIC POTASSIUM PHOSPHATE, DIBASIC 236; 224 MG/ML; MG/ML
15 INJECTION, SOLUTION INTRAVENOUS ONCE
Refills: 0 | Status: COMPLETED | OUTPATIENT
Start: 2024-01-30 | End: 2024-01-30

## 2024-01-30 RX ADMIN — ENOXAPARIN SODIUM 40 MILLIGRAM(S): 100 INJECTION SUBCUTANEOUS at 22:14

## 2024-01-30 RX ADMIN — POTASSIUM PHOSPHATE, MONOBASIC POTASSIUM PHOSPHATE, DIBASIC 62.5 MILLIMOLE(S): 236; 224 INJECTION, SOLUTION INTRAVENOUS at 11:48

## 2024-01-30 RX ADMIN — MUPIROCIN 1 APPLICATION(S): 20 OINTMENT TOPICAL at 05:25

## 2024-01-30 RX ADMIN — MUPIROCIN 1 APPLICATION(S): 20 OINTMENT TOPICAL at 18:17

## 2024-01-30 NOTE — PROGRESS NOTE ADULT - SUBJECTIVE AND OBJECTIVE BOX
Que Hylton MD  PGY 1 Department of Internal Medicine        Patient is a 89y old  Female who presents with a chief complaint of shock (29 Jan 2024 07:09)      SUBJECTIVE / OVERNIGHT EVENTS:  Pt seen and examined. No acute overnight events. Denies fevers, chills, CP, SOB, Abdominal pain, N/V, Constipation, Diarrhea      MEDICATIONS  (STANDING):  enoxaparin Injectable 40 milliGRAM(s) SubCutaneous every 24 hours  mupirocin 2% Ointment 1 Application(s) Both Nostrils every 12 hours    MEDICATIONS  (PRN):      I&O's Summary    29 Jan 2024 07:01  -  30 Jan 2024 07:00  --------------------------------------------------------  IN: 400 mL / OUT: 800 mL / NET: -400 mL        Vital Signs Last 24 Hrs  T(C): 36.3 (30 Jan 2024 05:54), Max: 36.8 (29 Jan 2024 21:38)  T(F): 97.3 (30 Jan 2024 05:54), Max: 98.3 (29 Jan 2024 21:38)  HR: 78 (30 Jan 2024 05:54) (73 - 983)  BP: 160/96 (30 Jan 2024 05:54) (157/98 - 175/91)  BP(mean): --  RR: 17 (30 Jan 2024 05:54) (17 - 17)  SpO2: 95% (30 Jan 2024 05:54) (95% - 97%)    Parameters below as of 30 Jan 2024 05:54  Patient On (Oxygen Delivery Method): room air        CAPILLARY BLOOD GLUCOSE          PHYSICAL EXAM:  GENERAL: NAD,   HEAD:  Atraumatic, Normocephalic  EYES: EOMI, PERRL, conjunctiva and sclera clear  NECK: No JVD  CHEST/LUNG: Clear to auscultation bilaterally; No wheeze  HEART: Regular rate and rhythm; No murmurs, rubs, or gallops  ABDOMEN: Soft, Nontender, Nondistended; Bowel sounds present  EXTREMITIES:  2+ Peripheral Pulses, No clubbing, cyanosis, or edema  PSYCH: AAOx2  NEUROLOGY: non-focal  SKIN: No rashes or lesions       LABS:                        13.1   7.53  )-----------( 206      ( 29 Jan 2024 06:49 )             40.1     Auto Eosinophil # x     / Auto Eosinophil % x     / Auto Neutrophil # x     / Auto Neutrophil % x     / BANDS % x        01-29    140  |  104  |  16  ----------------------------<  96  3.9   |  27  |  0.76    Ca    8.6      29 Jan 2024 06:49  Mg     2.10     01-29  Phos  2.0     01-29          Urinalysis Basic - ( 29 Jan 2024 06:49 )    Color: x / Appearance: x / SG: x / pH: x  Gluc: 96 mg/dL / Ketone: x  / Bili: x / Urobili: x   Blood: x / Protein: x / Nitrite: x   Leuk Esterase: x / RBC: x / WBC x   Sq Epi: x / Non Sq Epi: x / Bacteria: x            RADIOLOGY & ADDITIONAL TESTS:    Imaging Personally Reviewed:    Consultant(s) Notes Reviewed:      Care Discussed with Consultants/Other Providers:

## 2024-01-30 NOTE — PROGRESS NOTE ADULT - PROBLEM SELECTOR PLAN 4
Pt hypoxic on arrival, received decadron x1, now improved.   Tested positive for COVID during prior hospitalization (1/22), repeat positive 1/27. CXR 1/27 negative  - Did not receive course of remdesivir or decadron during either admission  - Goal SpO2 >92%, continue supplemental O2 as needed, wean as tolerated. Currently on RA  - continue supportive care

## 2024-01-30 NOTE — PROGRESS NOTE ADULT - PROBLEM SELECTOR PLAN 1
In ED BP 99/53 with improvement to 120/60-80s after 1L IVF. Etiology sepsis vs. hypovolemia i/s/o poor PO intake   Per son has a history of CHF but recent echo from 1/25 shows normal EF (61%), prior echo studies also WNL  Hx of ESBL E.coli s/p ertapenem x 3 days from prior admission  - UA negative, f/u urine cultures i/s/o recent ESBL UTI  - f/u BCx 1/27 (NGTD)  -c/w maintenance IVF prn  -monitor off abx  -f/u orthostatics In ED BP 99/53 with improvement to 120/60-80s after 1L IVF. Etiology sepsis vs. hypovolemia i/s/o poor PO intake   Per son has a history of CHF but recent echo from 1/25 shows normal EF (61%), prior echo studies also WNL  Hx of ESBL E.coli s/p ertapenem x 3 days from prior admission  - UA negative  - f/u BCx 1/27 (NGTD)  -c/w maintenance IVF prn  -monitor off abx  -f/u orthostatics In ED BP 99/53 with improvement to 120/60-80s after 1L IVF. Etiology sepsis vs. hypovolemia i/s/o poor PO intake   Per son has a history of CHF but recent echo from 1/25 shows normal EF (61%), prior echo studies also WNL  Hx of ESBL E.coli s/p ertapenem x 3 days from prior admission  - UA negative  - f/u BCx 1/27 (NGTD)  -c/w maintenance IVF prn  -monitor off abx

## 2024-01-31 PROCEDURE — 99232 SBSQ HOSP IP/OBS MODERATE 35: CPT

## 2024-01-31 RX ADMIN — ENOXAPARIN SODIUM 40 MILLIGRAM(S): 100 INJECTION SUBCUTANEOUS at 21:51

## 2024-01-31 RX ADMIN — MUPIROCIN 1 APPLICATION(S): 20 OINTMENT TOPICAL at 06:01

## 2024-01-31 RX ADMIN — MUPIROCIN 1 APPLICATION(S): 20 OINTMENT TOPICAL at 17:57

## 2024-01-31 NOTE — PROGRESS NOTE ADULT - SUBJECTIVE AND OBJECTIVE BOX
Que Hylton MD  PGY 1 Department of Internal Medicine        Patient is a 89y old  Female who presents with a chief complaint of shock (30 Jan 2024 07:00)      SUBJECTIVE / OVERNIGHT EVENTS: Pt seen and examined. No acute overnight events. Denies fevers, chills, CP, SOB, Abdominal pain, N/V, Constipation, Diarrhea        MEDICATIONS  (STANDING):  enoxaparin Injectable 40 milliGRAM(s) SubCutaneous every 24 hours  mupirocin 2% Ointment 1 Application(s) Both Nostrils every 12 hours    MEDICATIONS  (PRN):      I&O's Summary      Vital Signs Last 24 Hrs  T(C): 36.9 (31 Jan 2024 05:15), Max: 36.9 (31 Jan 2024 05:15)  T(F): 98.5 (31 Jan 2024 05:15), Max: 98.5 (31 Jan 2024 05:15)  HR: 94 (31 Jan 2024 05:15) (73 - 94)  BP: 136/77 (31 Jan 2024 05:15) (136/77 - 143/87)  BP(mean): --  RR: 17 (31 Jan 2024 05:15) (17 - 18)  SpO2: 95% (31 Jan 2024 05:15) (95% - 97%)    Parameters below as of 31 Jan 2024 05:15  Patient On (Oxygen Delivery Method): room air        CAPILLARY BLOOD GLUCOSE          PHYSICAL EXAM:  GENERAL: NAD,   HEAD:  Atraumatic, Normocephalic  EYES: EOMI, PERRL, conjunctiva and sclera clear  NECK: No JVD  CHEST/LUNG: Clear to auscultation bilaterally; No wheeze  HEART: Regular rate and rhythm; No murmurs, rubs, or gallops  ABDOMEN: Soft, Nontender, Nondistended; Bowel sounds present  EXTREMITIES:  2+ Peripheral Pulses, No clubbing, cyanosis, or edema  PSYCH: AAOx3  NEUROLOGY: non-focal  SKIN: No rashes or lesions       LABS:                        13.2   6.00  )-----------( 222      ( 30 Jan 2024 06:15 )             41.1     Auto Eosinophil # x     / Auto Eosinophil % x     / Auto Neutrophil # x     / Auto Neutrophil % x     / BANDS % x        01-30    142  |  105  |  11  ----------------------------<  93  4.1   |  28  |  0.81    Ca    8.4      30 Jan 2024 06:15  Mg     2.10     01-30  Phos  2.5     01-30          Urinalysis Basic - ( 30 Jan 2024 06:15 )    Color: x / Appearance: x / SG: x / pH: x  Gluc: 93 mg/dL / Ketone: x  / Bili: x / Urobili: x   Blood: x / Protein: x / Nitrite: x   Leuk Esterase: x / RBC: x / WBC x   Sq Epi: x / Non Sq Epi: x / Bacteria: x            RADIOLOGY & ADDITIONAL TESTS:    Imaging Personally Reviewed:    Consultant(s) Notes Reviewed:      Care Discussed with Consultants/Other Providers:

## 2024-01-31 NOTE — ADVANCED PRACTICE NURSE CONSULT - REASON FOR CONSULT
Patient seen on skin care rounds after wound care referral received for assessment of skin impairment and recommendations of topical management. As per H&P, patient is a 89-year-old female history of hypertension, ?CHF, hypothyroidism, A-fib, dementia, right hip replacement, recently discharged from hospitalization (for COVID infection and ESBL- UTI) at [facility] yesterday, now presenting from home with concern for altered mental status, lethargy, hypotension in the field. Chart reviewed: WBC 6.0, H/H 13.2/41.1, Platelets 222, Serum albumin 3.1, BMI 23.1, Javy 16.

## 2024-01-31 NOTE — ADVANCED PRACTICE NURSE CONSULT - ASSESSMENT
General: A&Ox3, able to turn with 1x assistance, incontinent of urine and stool. Skin warm, dry with increased moisture in intertriginous folds, scattered areas of hyperpigmentation and hypopigmentation, scattered areas of ecchymosis without hematoma on bilateral upper extremities.     Vascular of b/l lower extremities: Bilateral lower extremities with scattered areas of hyper and hypopigmentation. No temperature changes noted. No Edema. Capillary refill less than 3 seconds. +2 DP/PT pulses. Blanchable erythema noted to b/l heels.     Sacralgluteal cleft: Moisture associated dermatitis. No suspected candidiasis. Presenting as blanchable erythema within sacralgluteal cleft towards buttocks. No open ulcerations noted. No increased warmth, no drainage, no fluctuance, no induration, no edema, no overt signs of infection noted at this time. Goals of care: monitor for tissue type changes and continue measures to decrease friction/shear/pressure.

## 2024-01-31 NOTE — PROGRESS NOTE ADULT - PROBLEM SELECTOR PLAN 1
In ED BP 99/53 with improvement to 120/60-80s after 1L IVF. Etiology sepsis vs. hypovolemia i/s/o poor PO intake   Per son has a history of CHF but recent echo from 1/25 shows normal EF (61%), prior echo studies also WNL  Hx of ESBL E.coli s/p ertapenem x 3 days from prior admission  - UA negative  - f/u BCx 1/27 (NGTD)  -c/w maintenance IVF prn  -monitor off abx

## 2024-01-31 NOTE — ADVANCED PRACTICE NURSE CONSULT - RECOMMEDATIONS
Topical recommendations:     Sacrum to b/l buttocks: Cleanse with skin cleanser, pat dry. Apply Sonido moisture barrier cream twice a day and PRN with incontinent episodes.     Continue low air loss bed therapy, continue heel elevation, continue to turn & reposition as per protocol, soft pillow between bony prominences, continue moisture management with barrier creams & single breathable pad, continue measures to decrease friction/shear/pressure. Continue with nutritional support as per dietary/orders.     Plan of care discussed with Primary RN Ernestina and     Please contact Wound/Ostomy Care Service Line if we can be of further assistance (ext [phone]).  Topical recommendations:     Sacrum to b/l buttocks: Cleanse with skin cleanser, pat dry. Apply Sonido moisture barrier cream twice a day and PRN with incontinent episodes.     Continue low air loss bed therapy, continue heel elevation, continue to turn & reposition as per protocol, soft pillow between bony prominences, continue moisture management with barrier creams & single breathable pad, continue measures to decrease friction/shear/pressure. Continue with nutritional support as per dietary/orders.     Plan of care discussed with Primary RN Ernestina and    Please contact Wound/Ostomy Care Service Line if we can be of further assistance (ext [phone]).  Topical recommendations:     Sacrum to b/l buttocks: Cleanse with skin cleanser, pat dry. Apply Sonido moisture barrier cream twice a day and PRN with incontinent episodes.     Continue low air loss bed therapy, continue heel elevation, continue to turn & reposition as per protocol, soft pillow between bony prominences, continue moisture management with barrier creams & single breathable pad, continue measures to decrease friction/shear/pressure. Continue with nutritional support as per dietary/orders.     Plan of care discussed with Primary RN Ernestina and Que Hylton).    Please contact Wound/Ostomy Care Service Line if we can be of further assistance (ext [phone]).

## 2024-02-01 LAB
CULTURE RESULTS: SIGNIFICANT CHANGE UP
CULTURE RESULTS: SIGNIFICANT CHANGE UP
SPECIMEN SOURCE: SIGNIFICANT CHANGE UP
SPECIMEN SOURCE: SIGNIFICANT CHANGE UP

## 2024-02-01 PROCEDURE — 99232 SBSQ HOSP IP/OBS MODERATE 35: CPT

## 2024-02-01 RX ADMIN — MUPIROCIN 1 APPLICATION(S): 20 OINTMENT TOPICAL at 06:46

## 2024-02-01 RX ADMIN — MUPIROCIN 1 APPLICATION(S): 20 OINTMENT TOPICAL at 17:11

## 2024-02-01 RX ADMIN — ENOXAPARIN SODIUM 40 MILLIGRAM(S): 100 INJECTION SUBCUTANEOUS at 21:38

## 2024-02-01 NOTE — PROGRESS NOTE ADULT - SUBJECTIVE AND OBJECTIVE BOX
Que Hylton MD  PGY 1 Department of Internal Medicine        Patient is a 89y old  Female who presents with a chief complaint of shock (31 Jan 2024 07:01)      SUBJECTIVE / OVERNIGHT EVENTS: Pt seen and examined. No acute overnight events. Denies fevers, chills, CP, SOB, Abdominal pain, N/V, Constipation, Diarrhea        MEDICATIONS  (STANDING):  enoxaparin Injectable 40 milliGRAM(s) SubCutaneous every 24 hours  mupirocin 2% Ointment 1 Application(s) Both Nostrils every 12 hours    MEDICATIONS  (PRN):      I&O's Summary      Vital Signs Last 24 Hrs  T(C): 36.3 (01 Feb 2024 05:57), Max: 36.6 (31 Jan 2024 13:00)  T(F): 97.4 (01 Feb 2024 05:57), Max: 97.8 (31 Jan 2024 13:00)  HR: 84 (01 Feb 2024 05:57) (72 - 84)  BP: 156/79 (01 Feb 2024 05:57) (133/67 - 156/79)  BP(mean): --  RR: 17 (01 Feb 2024 05:57) (17 - 18)  SpO2: 95% (01 Feb 2024 05:57) (95% - 97%)    Parameters below as of 01 Feb 2024 05:57  Patient On (Oxygen Delivery Method): room air        CAPILLARY BLOOD GLUCOSE          PHYSICAL EXAM:  GENERAL: NAD,   HEAD:  Atraumatic, Normocephalic  EYES: EOMI, conjunctiva and sclera clear  CHEST/LUNG: Clear to auscultation bilaterally; No wheeze  HEART: Regular rate and rhythm; No murmurs, rubs, or gallops  ABDOMEN: Soft, Nontender, Nondistended  EXTREMITIES:  2+ Peripheral Pulses, No clubbing, cyanosis, or edema  NEUROLOGY: non-focal AAOx2  SKIN: No rashes or lesions       LABS:                      RADIOLOGY & ADDITIONAL TESTS:    Imaging Personally Reviewed:    Consultant(s) Notes Reviewed:      Care Discussed with Consultants/Other Providers:

## 2024-02-02 ENCOUNTER — TRANSCRIPTION ENCOUNTER (OUTPATIENT)
Age: 89
End: 2024-02-02

## 2024-02-02 VITALS
SYSTOLIC BLOOD PRESSURE: 144 MMHG | DIASTOLIC BLOOD PRESSURE: 81 MMHG | TEMPERATURE: 98 F | HEART RATE: 85 BPM | RESPIRATION RATE: 16 BRPM | OXYGEN SATURATION: 95 %

## 2024-02-02 PROCEDURE — 99239 HOSP IP/OBS DSCHRG MGMT >30: CPT

## 2024-02-02 RX ADMIN — MUPIROCIN 1 APPLICATION(S): 20 OINTMENT TOPICAL at 05:10

## 2024-02-02 NOTE — DISCHARGE NOTE NURSING/CASE MANAGEMENT/SOCIAL WORK - NSDPFAC_GEN_ALL_CORE
[facility] [address] [place] [id number] [phone] yes

## 2024-02-02 NOTE — DISCHARGE NOTE NURSING/CASE MANAGEMENT/SOCIAL WORK - NSDCFUADDAPPT_GEN_ALL_CORE_FT
APPTS ARE READY TO BE MADE: [ X ] YES    Best Family or Patient Contact (if needed):    Additional Information about above appointments (if needed):    1: Primary care provider  2:   3:     Other comments or requests:

## 2024-02-02 NOTE — PROGRESS NOTE ADULT - SUBJECTIVE AND OBJECTIVE BOX
Que Hylton MD  PGY 1 Department of Internal Medicine        Patient is a 89y old  Female who presents with a chief complaint of shock (01 Feb 2024 07:24)      SUBJECTIVE / OVERNIGHT EVENTS: Pt seen and examined. No acute overnight events. Denies fevers, chills, CP, SOB, Abdominal pain, N/V, Constipation, Diarrhea        MEDICATIONS  (STANDING):  enoxaparin Injectable 40 milliGRAM(s) SubCutaneous every 24 hours  mupirocin 2% Ointment 1 Application(s) Both Nostrils every 12 hours    MEDICATIONS  (PRN):      I&O's Summary    01 Feb 2024 07:01  -  02 Feb 2024 07:00  --------------------------------------------------------  IN: 0 mL / OUT: 700 mL / NET: -700 mL        Vital Signs Last 24 Hrs  T(C): 36.7 (02 Feb 2024 05:20), Max: 36.7 (02 Feb 2024 05:20)  T(F): 98 (02 Feb 2024 05:20), Max: 98 (02 Feb 2024 05:20)  HR: 66 (02 Feb 2024 05:20) (66 - 88)  BP: 153/64 (02 Feb 2024 05:20) (132/65 - 165/88)  BP(mean): --  RR: 17 (02 Feb 2024 05:20) (17 - 17)  SpO2: 96% (02 Feb 2024 05:20) (95% - 96%)    Parameters below as of 02 Feb 2024 05:20  Patient On (Oxygen Delivery Method): room air        CAPILLARY BLOOD GLUCOSE          PHYSICAL EXAM:  GENERAL: NAD,   HEAD:  Atraumatic, Normocephalic  EYES: EOMI, conjunctiva and sclera clear  CHEST/LUNG: Clear to auscultation bilaterally; No wheeze  HEART: Regular rate and rhythm; No murmurs, rubs, or gallops  ABDOMEN: Soft, Nontender, Nondistended  EXTREMITIES:  2+ Peripheral Pulses, No clubbing, cyanosis, or edema  NEUROLOGY: non-focal AAOx2  SKIN: No rashes or lesions       LABS:                      RADIOLOGY & ADDITIONAL TESTS:    Imaging Personally Reviewed:    Consultant(s) Notes Reviewed:      Care Discussed with Consultants/Other Providers:

## 2024-02-02 NOTE — PROGRESS NOTE ADULT - ATTENDING COMMENTS
The patient is a 89-year-old woman who is followed for atrial fibrillation and for hypothyroidism who had a recent hospital admission for a fall most likely secondary to weakness in the setting of a urinary tract infection vs. a covid-19 and who, shortly following discharge, was readmitted to our hospital for evaluation and management of new hypotension of uncertain etiology.     Hypotension: -Etiology of transient hypotension on admission not completely certain--possibly secondary to resolving urinary tract infection and covid; no suspicion for cardiogenic, hypovolemic, or obstructive etiology   -No additional antibiotic therapy after admission; hypotension subsequently improved and blood pressure has remained within normal limits     Patient is now clinically stable and ready for transition of care to rehab; currently working on insurance authorization; covid positive at time of initial presentation on 1/22 to [facility] hospital; isolation to end 2/2     Contacted pharmacy and family in an attempt to obtain thorough medication reconciliation to determine why patient is not on oral anticoagulation therapy in setting of afib and why patient is not on medication for management of hypothyroidism; unclear why medication was discontinued but patient does not seem to have been taking these medications for some time    Will make pcp referral on discharge for patient to engage in risks-benefits discussion of administration of these medications
The patient is a 89-year-old woman who is followed for atrial fibrillation and for hypothyroidism who had a recent hospital admission for a fall most likely secondary to weakness in the setting of a urinary tract infection vs. a covid-19 and who, shortly following discharge, was readmitted to our hospital for evaluation and management of new hypotension of uncertain etiology.     Hypotension: -Etiology of transient hypotension on admission not completely certain--possibly secondary to resolving urinary tract infection and covid; no suspicion for cardiogenic, hypovolemic, or obstructive etiology   -No additional antibiotic therapy after admission; hypotension subsequently improved and blood pressure has remained within normal limits     Patient is now clinically stable and ready for transition of care to rehab; currently working on insurance authorization; covid positive at time of initial presentation on 1/22 to [facility] hospital; isolation to end 2/2     Contacted pharmacy and family in an attempt to obtain thorough medication reconciliation to determine why patient is not on oral anticoagulation therapy in setting of afib and why patient is not on medication for management of hypothyroidism; unclear why medication was discontinued but patient does not seem to have been taking these medications for some time    Will make pcp referral on discharge for patient to engage in risks-benefits discussion of administration of these medications; will plan for discharge today to sub-acute rehab
The patient is a 89-year-old woman who is followed for atrial fibrillation and for hypothyroidism who had a recent hospital admission for a fall most likely secondary to weakness in the setting of a urinary tract infection vs. a covid-19 and who, shortly following discharge, was readmitted to our hospital for evaluation and management of new hypotension of uncertain etiology.     Hypotension: -Etiology of transient hypotension on admission not completely certain--possibly secondary to resolving urinary tract infection and covid; no suspicion for cardiogenic, hypovolemic, or obstructive etiology   -No additional antibiotic therapy after admission; hypotension subsequently improved and blood pressure has remained within normal limits     Patient is now clinically stable and ready for transition of care to rehab; currently working on insurance authorization     Contacted pharmacy and family in an attempt to obtain thorough medication reconciliation to determine why patient is not on oral anticoagulation therapy in setting of afib and why patient is not on medication for management of hypothyroidism; unclear why medication was discontinued but patient does not seem to have been taking these medications for some time    Will make pcp referral on discharge for patient to engage in risks-benefits discussion of administration of these medications
The patient is a 89-year-old woman who is followed for atrial fibrillation and for hypothyroidism who had a recent hospital admission for a fall most likely secondary to weakness in the setting of a urinary tract infection vs. a covid-19 and who, shortly following discharge, was readmitted to our hospital for evaluation and management of new hypotension of uncertain etiology.     Hypotension: -Etiology of transient hypotension on admission not completely certain--possibly secondary to resolving urinary tract infection and covid; no suspicion for cardiogenic, hypovolemic, or obstructive etiology   -No additional antibiotic therapy after admission; hypotension subsequently improved and blood pressure has remained within normal limits     Patient is now clinically stable and ready for transition of care to rehab; currently working on insurance authorization     Contacted pharmacy and family in an attempt to obtain thorough medication reconciliation to determine why patient is not on oral anticoagulation therapy in setting of afib and why patient is not on medication for management of hypothyroidism; unclear why medication was discontinued but patient does not seem to have been taking these medications for some time    Will make pcp referral on discharge for patient to engage in risks-benefits discussion of administration of these medications
The patient is a 89-year-old woman who is followed for atrial fibrillation and for hypothyroidism who had a recent hospital admission for a fall most likely secondary to weakness in the setting of a urinary tract infection vs. a covid-19 and who, shortly following discharge, was readmitted to our hospital for evaluation and management of new hypotension of uncertain etiology.     Hypotension: -Etiology of transient hypotension on admission not completely certain--possibly secondary to resolving urinary tract infection and covid; no suspicion for cardiogenic, hypovolemic, or obstructive etiology   -No additional antibiotic therapy after admission; hypotension subsequently improved     Patient is now clinically stable and ready for transition of care to rehab     Prior to discharge; will obtain thorough medication reconciliation to determine why patient is not on oral anticoagulation therapy in setting of afib and why patient is not on medication for management of hypothyroidism
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 Now resolved after IVFs. Given unresponsiveness in admission will test orthostatics. No need for CTH or cervical spine as no trauma. if mental status changes can reconsider. Patient sitting up in bed praying with her rosary. TTE done 1/25/24 with normal EF 61% no need to repeat. Patient with tenderness to right leg, pending XRAY to r/o occult fracture. COV + with no sxs, continue to monitor.

## 2024-02-02 NOTE — DISCHARGE NOTE NURSING/CASE MANAGEMENT/SOCIAL WORK - NSDCPEFALRISK_GEN_ALL_CORE
For information on Fall & Injury Prevention, visit: [email or web address] OR  [email or web address] OR  https://www.cdc.gov/steadi/patient.html

## 2024-02-02 NOTE — DISCHARGE NOTE NURSING/CASE MANAGEMENT/SOCIAL WORK - NSPROMEDSBROUGHTTOHOSP_GEN_A_NUR
Patient called back writer explained about the epidural update from the last message. Patient verbalized understanding and will wait for [facility] call to schedule her Epidural appointment. Please advise.   no

## 2024-02-02 NOTE — PROGRESS NOTE ADULT - PROBLEM/PLAN-2
DISPLAY PLAN FREE TEXT
68m with abdominal pain in the setting of poorly controlled DM2 and possible esophagitis    - no chest pain  - besides sinus tachycardia, EKG is unchanged compared to 2019  - mildly elevated troponin, trend is now flat  - continue ASA 81mg daily, start atorvastatin 20mg daily  - low threshold to rule out PE  - if develops abdominal pain or lactic acidosis not resolving, low threshold to rule out mesenteric ischemia given known history of PAD  - no cardiac contraindications to IVF resuscitation at this time  - echo to assess biventricular function  - treatment of multiple metabolic derrangements and possible infection per primary team  - ischemia evaluation, likely pharm NST, prior to discharge
DISPLAY PLAN FREE TEXT
DISPLAY PLAN FREE TEXT

## 2024-02-02 NOTE — DISCHARGE NOTE NURSING/CASE MANAGEMENT/SOCIAL WORK - PATIENT PORTAL LINK FT
You can access the FollowMyHealth Patient Portal offered by [facility] by registering at the following website: [email or web address]. By joining [employer]’s FollowMyHealth portal, you will also be able to view your health information using other applications (apps) compatible with our system.

## 2024-02-02 NOTE — PROGRESS NOTE ADULT - TIME BILLING
chart review, examination, documentation
Time spent on review of vitals, physical exam, documentation, and discussion of plan of care with patient and patient family.
chart review, examination, documentation

## 2024-02-04 ENCOUNTER — EMERGENCY (EMERGENCY)
Facility: HOSPITAL | Age: 89
LOS: 1 days | Discharge: HOME | End: 2024-02-04
Attending: EMERGENCY MEDICINE
Payer: MEDICARE

## 2024-02-04 VITALS
TEMPERATURE: 97 F | HEART RATE: 72 BPM | SYSTOLIC BLOOD PRESSURE: 107 MMHG | RESPIRATION RATE: 17 BRPM | HEIGHT: 69 IN | OXYGEN SATURATION: 97 % | WEIGHT: 154.54 LBS | DIASTOLIC BLOOD PRESSURE: 69 MMHG

## 2024-02-04 VITALS
DIASTOLIC BLOOD PRESSURE: 62 MMHG | SYSTOLIC BLOOD PRESSURE: 124 MMHG | OXYGEN SATURATION: 98 % | TEMPERATURE: 98 F | HEART RATE: 60 BPM | RESPIRATION RATE: 16 BRPM

## 2024-02-04 DIAGNOSIS — Z96.649 PRESENCE OF UNSPECIFIED ARTIFICIAL HIP JOINT: Chronic | ICD-10-CM

## 2024-02-04 LAB
ALBUMIN SERPL ELPH-MCNC: 3.5 G/DL — SIGNIFICANT CHANGE UP (ref 3.3–5)
ALP SERPL-CCNC: 84 U/L — SIGNIFICANT CHANGE UP (ref 40–120)
ALT FLD-CCNC: 16 U/L — SIGNIFICANT CHANGE UP (ref 10–45)
ANION GAP SERPL CALC-SCNC: 9 MMOL/L — SIGNIFICANT CHANGE UP (ref 5–17)
APTT BLD: 34 SEC — SIGNIFICANT CHANGE UP (ref 24.5–35.6)
AST SERPL-CCNC: 28 U/L — SIGNIFICANT CHANGE UP (ref 10–40)
BASOPHILS # BLD AUTO: 0.05 K/UL — SIGNIFICANT CHANGE UP (ref 0–0.2)
BASOPHILS NFR BLD AUTO: 0.6 % — SIGNIFICANT CHANGE UP (ref 0–2)
BILIRUB SERPL-MCNC: 0.8 MG/DL — SIGNIFICANT CHANGE UP (ref 0.2–1.2)
BUN SERPL-MCNC: 21 MG/DL — SIGNIFICANT CHANGE UP (ref 7–23)
CALCIUM SERPL-MCNC: 9.5 MG/DL — SIGNIFICANT CHANGE UP (ref 8.4–10.5)
CHLORIDE SERPL-SCNC: 100 MMOL/L — SIGNIFICANT CHANGE UP (ref 96–108)
CO2 SERPL-SCNC: 27 MMOL/L — SIGNIFICANT CHANGE UP (ref 22–31)
CREAT SERPL-MCNC: 1.14 MG/DL — SIGNIFICANT CHANGE UP (ref 0.5–1.3)
D DIMER BLD IA.RAPID-MCNC: 479 NG/ML DDU — HIGH
EGFR: 45 ML/MIN/1.73M2 — LOW
EOSINOPHIL # BLD AUTO: 0.08 K/UL — SIGNIFICANT CHANGE UP (ref 0–0.5)
EOSINOPHIL NFR BLD AUTO: 1 % — SIGNIFICANT CHANGE UP (ref 0–6)
GLUCOSE SERPL-MCNC: 125 MG/DL — HIGH (ref 70–99)
HCT VFR BLD CALC: 43.8 % — SIGNIFICANT CHANGE UP (ref 34.5–45)
HGB BLD-MCNC: 13.7 G/DL — SIGNIFICANT CHANGE UP (ref 11.5–15.5)
IMM GRANULOCYTES NFR BLD AUTO: 0.2 % — SIGNIFICANT CHANGE UP (ref 0–0.9)
INR BLD: 1.15 RATIO — SIGNIFICANT CHANGE UP (ref 0.85–1.18)
LIDOCAIN IGE QN: 28 U/L — SIGNIFICANT CHANGE UP (ref 7–60)
LYMPHOCYTES # BLD AUTO: 1.63 K/UL — SIGNIFICANT CHANGE UP (ref 1–3.3)
LYMPHOCYTES # BLD AUTO: 19.5 % — SIGNIFICANT CHANGE UP (ref 13–44)
MAGNESIUM SERPL-MCNC: 2.3 MG/DL — SIGNIFICANT CHANGE UP (ref 1.6–2.6)
MCHC RBC-ENTMCNC: 30.4 PG — SIGNIFICANT CHANGE UP (ref 27–34)
MCHC RBC-ENTMCNC: 31.3 GM/DL — LOW (ref 32–36)
MCV RBC AUTO: 97.3 FL — SIGNIFICANT CHANGE UP (ref 80–100)
MONOCYTES # BLD AUTO: 0.66 K/UL — SIGNIFICANT CHANGE UP (ref 0–0.9)
MONOCYTES NFR BLD AUTO: 7.9 % — SIGNIFICANT CHANGE UP (ref 2–14)
NEUTROPHILS # BLD AUTO: 5.9 K/UL — SIGNIFICANT CHANGE UP (ref 1.8–7.4)
NEUTROPHILS NFR BLD AUTO: 70.8 % — SIGNIFICANT CHANGE UP (ref 43–77)
NRBC # BLD: 0 /100 WBCS — SIGNIFICANT CHANGE UP (ref 0–0)
NT-PROBNP SERPL-SCNC: 216 PG/ML — SIGNIFICANT CHANGE UP (ref 0–300)
PLATELET # BLD AUTO: 365 K/UL — SIGNIFICANT CHANGE UP (ref 150–400)
POTASSIUM SERPL-MCNC: 5 MMOL/L — SIGNIFICANT CHANGE UP (ref 3.5–5.3)
POTASSIUM SERPL-SCNC: 5 MMOL/L — SIGNIFICANT CHANGE UP (ref 3.5–5.3)
PROT SERPL-MCNC: 7.1 G/DL — SIGNIFICANT CHANGE UP (ref 6–8.3)
PROTHROM AB SERPL-ACNC: 12 SEC — SIGNIFICANT CHANGE UP (ref 9.5–13)
RBC # BLD: 4.5 M/UL — SIGNIFICANT CHANGE UP (ref 3.8–5.2)
RBC # FLD: 13 % — SIGNIFICANT CHANGE UP (ref 10.3–14.5)
SODIUM SERPL-SCNC: 136 MMOL/L — SIGNIFICANT CHANGE UP (ref 135–145)
TROPONIN T, HIGH SENSITIVITY RESULT: 20 NG/L — SIGNIFICANT CHANGE UP (ref 0–51)
TROPONIN T, HIGH SENSITIVITY RESULT: 29 NG/L — SIGNIFICANT CHANGE UP (ref 0–51)
WBC # BLD: 8.34 K/UL — SIGNIFICANT CHANGE UP (ref 3.8–10.5)
WBC # FLD AUTO: 8.34 K/UL — SIGNIFICANT CHANGE UP (ref 3.8–10.5)

## 2024-02-04 PROCEDURE — 85730 THROMBOPLASTIN TIME PARTIAL: CPT

## 2024-02-04 PROCEDURE — 83880 ASSAY OF NATRIURETIC PEPTIDE: CPT

## 2024-02-04 PROCEDURE — 71275 CT ANGIOGRAPHY CHEST: CPT | Mod: 26,MA

## 2024-02-04 PROCEDURE — 71275 CT ANGIOGRAPHY CHEST: CPT | Mod: MA

## 2024-02-04 PROCEDURE — 71045 X-RAY EXAM CHEST 1 VIEW: CPT

## 2024-02-04 PROCEDURE — 93005 ELECTROCARDIOGRAM TRACING: CPT

## 2024-02-04 PROCEDURE — 85610 PROTHROMBIN TIME: CPT

## 2024-02-04 PROCEDURE — 83735 ASSAY OF MAGNESIUM: CPT

## 2024-02-04 PROCEDURE — 36415 COLL VENOUS BLD VENIPUNCTURE: CPT

## 2024-02-04 PROCEDURE — 96374 THER/PROPH/DIAG INJ IV PUSH: CPT | Mod: XU

## 2024-02-04 PROCEDURE — 99285 EMERGENCY DEPT VISIT HI MDM: CPT

## 2024-02-04 PROCEDURE — 85379 FIBRIN DEGRADATION QUANT: CPT

## 2024-02-04 PROCEDURE — 99285 EMERGENCY DEPT VISIT HI MDM: CPT | Mod: 25

## 2024-02-04 PROCEDURE — 85025 COMPLETE CBC W/AUTO DIFF WBC: CPT

## 2024-02-04 PROCEDURE — 83690 ASSAY OF LIPASE: CPT

## 2024-02-04 PROCEDURE — 84484 ASSAY OF TROPONIN QUANT: CPT

## 2024-02-04 PROCEDURE — 71045 X-RAY EXAM CHEST 1 VIEW: CPT | Mod: 26

## 2024-02-04 PROCEDURE — 80053 COMPREHEN METABOLIC PANEL: CPT

## 2024-02-04 RX ORDER — ACETAMINOPHEN 500 MG
1000 TABLET ORAL ONCE
Refills: 0 | Status: COMPLETED | OUTPATIENT
Start: 2024-02-04 | End: 2024-02-04

## 2024-02-04 RX ADMIN — Medication 400 MILLIGRAM(S): at 10:42

## 2024-02-04 NOTE — ED ADULT NURSE NOTE - NSFALLHARMRISKINTERV_ED_ALL_ED

## 2024-02-04 NOTE — ED PROVIDER NOTE - PATIENT PORTAL LINK FT
You can access the FollowMyHealth Patient Portal offered by [facility] by registering at the following website: [email or web address]. By joining [employer]’s FollowMyHealth portal, you will also be able to view your health information using other applications (apps) compatible with our system.

## 2024-02-04 NOTE — ED ADULT NURSE REASSESSMENT NOTE - NS ED NURSE REASSESS COMMENT FT1
Report received from MIGUEL Delaney. Pt A&Ox1 oriented to self disoriented to time and situation, IV intact and patent, VSS, pt denies pain/discomfort, bed locked and in lowest position, call bell within reach and pt instructed on use, safety and comfort measures maintained.

## 2024-02-04 NOTE — ED PROVIDER NOTE - NSFOLLOWUPINSTRUCTIONS_ED_ALL_ED_FT
You were seen for chest pain. You got a CT which did not show any acute pathology, and your blood work was unremarkable.    Follow up with your Primary Doctor within 2-3 days.    Return with any chest pain, shortness of breath, dizziness, leg swelling or any other concerning symptoms.

## 2024-02-04 NOTE — ED ADULT NURSE NOTE - OBJECTIVE STATEMENT
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 EMS gave 324 aspirin. Pt. never complained of cp to EMS or on arrival to [facility] Patient is well appearing, VSS, complains when being touched/asked questions but has no physical complaints.  here for chest pain unclear for how long, says it is likely for one week. was given 324 asa by EMS. Denies fevers, chills, shortness of breath, dizziness, n/v, abdominal pain, leg swelling. 20g peripheral IV paced in R Ac and labs drawn and sent to lab. EKG done and reviewed by MD. Patient placed on CM. Patient undressed and placed into gown, call bell in hand and side rails up with bed in lowest position for safety. blanket provided. Comfort and safety provided.

## 2024-02-04 NOTE — ED PROVIDER NOTE - OBJECTIVE STATEMENT
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 was given 324 asa by EMS. was recently admitted to [facility] for lethargy found to be COVID + w elevated troponin, and ESBL e.coli s/p ertapenem. denies fevers, chills, shortness of breath, dizziness, n/v, abdominal pain, leg swelling. on arrival comfortable appearing /69 RR 17 HR 72 afebrile 97%on RA.     of note on recent admission cardiology saw her, follows w dr. du, echo LVef 61%, trace MR AR no pericardial effusion.

## 2024-02-04 NOTE — ED PROVIDER NOTE - CLINICAL SUMMARY MEDICAL DECISION MAKING FREE TEXT BOX
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 given 324 asa by EMS. Was recently admitted to [facility] for lethargy found to be COVID + w elevated troponin, and ESBL e.coli s/p ertapenem. Denies fevers, chills, shortness of breath, dizziness, n/v, abdominal pain, leg swelling. On PE, pt comfortable, normal s1,s2, clear lungs, afebrile, vitals stable, 97% SpO2 on RA. EKG wnl but does have low voltage. Given normal SpO2 and pt nontoxic, PE less likely, has afib not on AC but low risk Well's score. f/u D-dimer. f/u CXR, CBC, CMP. f/u troponin iso recent elevated troponin, r/o ACS. Plan for bedside echo given hx of pericardial effusion and low voltage EKG.     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 given 324 asa by EMS. Was recently admitted to [facility] for lethargy found to be COVID + w elevated troponin, and ESBL e.coli s/p ertapenem. Denies fevers, chills, shortness of breath, dizziness, n/v, abdominal pain, leg swelling. On PE, pt comfortable, normal s1,s2, clear lungs, afebrile, vitals stable, 97% SpO2 on RA. EKG wnl but does have low voltage. Given normal SpO2 and pt nontoxic, PE less likely, has afib not on AC but low risk Well's score. f/u D-dimer. f/u CTA. f/u CXR, CBC, CMP. f/u troponin iso recent elevated troponin, r/o ACS. Plan for bedside echo given hx of pericardial effusion and low voltage EKG. 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 given 324 asa by EMS. Was recently admitted to [facility] for lethargy found to be COVID + w elevated troponin, and ESBL e.coli s/p ertapenem. Denies fevers, chills, shortness of breath, dizziness, n/v, abdominal pain, leg swelling. On PE, pt comfortable, normal s1,s2, clear lungs, afebrile, vitals stable, 97% SpO2 on RA. EKG wnl but does have low voltage. Given normal SpO2 and pt nontoxic, PE less likely, has afib not on AC but low risk Well's score. f/u D-dimer. f/u CTA. f/u CXR, CBC, CMP. f/u troponin iso recent elevated troponin, r/o ACS. Plan for bedside echo given hx of pericardial effusion and low voltage EKG.    TANA Hammer MD: Agree with resident/ACP MDM, assessment and plan as above.

## 2024-12-06 NOTE — PROGRESS NOTE ADULT - PROBLEM/PLAN-2
DISPLAY PLAN FREE TEXT
PROVIDER:[TOKEN:[[id number]],FOLLOWUP:[1 week]],PROVIDER:[TOKEN:[[id number]],FOLLOWUP:[1 week]],PROVIDER:[TOKEN:[[id number]],FOLLOWUP:[1 week]]

## 2025-07-09 NOTE — PATIENT PROFILE ADULT - NSPROPTRIGHTREPNAME_GEN_A__NUR
Endocrine Refill protocol for oral and injectable diabetic medications    Protocol Criteria:  FAILED  Reason: Elevated A1C    If all below requirements are met, send a 90-day supply with 1 refill per provider protocol.    Verify appointment with Endocrinology completed in the last 6 months or scheduled in the next 3 months.  Verify A1C has been completed within the last 6 months and is below 8.5%     Last completed office visit: 4/3/2025 Ally York DO   Next scheduled Follow up:   Future Appointments   Date Time Provider Department Center   7/11/2025 10:30 AM Melva Webb APN [facility]   10/3/2025 11:00 AM Ally York DO [id number] [facility]      Last A1c result: Last A1c value was 8.5% done 7/8/2025.    Routed for review.   
Lian Caldwell